# Patient Record
Sex: MALE
[De-identification: names, ages, dates, MRNs, and addresses within clinical notes are randomized per-mention and may not be internally consistent; named-entity substitution may affect disease eponyms.]

---

## 2018-10-25 ENCOUNTER — HOSPITAL (OUTPATIENT)
Dept: OTHER | Age: 48
End: 2018-10-25
Attending: FAMILY MEDICINE

## 2019-08-27 ENCOUNTER — HOSPITAL ENCOUNTER (OUTPATIENT)
Dept: CT IMAGING | Facility: HOSPITAL | Age: 49
Discharge: HOME | End: 2019-08-27
Attending: FAMILY MEDICINE
Payer: COMMERCIAL

## 2019-08-27 DIAGNOSIS — K59.00 CONSTIPATION, UNSPECIFIED CONSTIPATION TYPE: ICD-10-CM

## 2019-08-27 DIAGNOSIS — Z87.19 HISTORY OF DIVERTICULITIS: ICD-10-CM

## 2019-08-27 LAB — CREAT BLD-MCNC: 1 MG/DL (ref 0.7–1.3)

## 2019-08-27 PROCEDURE — 82565 ASSAY OF CREATININE: CPT

## 2019-08-27 PROCEDURE — 74177 CT ABD & PELVIS W/CONTRAST: CPT | Performed by: FAMILY MEDICINE

## 2019-08-28 ENCOUNTER — HOSPITAL ENCOUNTER (INPATIENT)
Facility: HOSPITAL | Age: 49
LOS: 11 days | Discharge: HOME HEALTH | DRG: 330 | End: 2019-09-08
Attending: EMERGENCY MEDICINE | Admitting: HOSPITALIST
Payer: COMMERCIAL

## 2019-08-28 DIAGNOSIS — K63.1 PERFORATED SIGMOID COLON (HCC): Primary | ICD-10-CM

## 2019-08-28 LAB
ALBUMIN SERPL-MCNC: 4.1 G/DL (ref 3.4–5)
ALP LIVER SERPL-CCNC: 50 U/L (ref 45–117)
ALT SERPL-CCNC: 32 U/L (ref 16–61)
ANION GAP SERPL CALC-SCNC: 5 MMOL/L (ref 0–18)
AST SERPL-CCNC: 23 U/L (ref 15–37)
BASOPHILS # BLD AUTO: 0.08 X10(3) UL (ref 0–0.2)
BASOPHILS NFR BLD AUTO: 0.9 %
BILIRUB DIRECT SERPL-MCNC: 0.1 MG/DL (ref 0–0.2)
BILIRUB SERPL-MCNC: 0.6 MG/DL (ref 0.1–2)
BILIRUB UR QL: NEGATIVE
BUN BLD-MCNC: 17 MG/DL (ref 7–18)
BUN/CREAT SERPL: 15.6 (ref 10–20)
CALCIUM BLD-MCNC: 9.1 MG/DL (ref 8.5–10.1)
CHLORIDE SERPL-SCNC: 107 MMOL/L (ref 98–112)
CLARITY UR: CLEAR
CO2 SERPL-SCNC: 28 MMOL/L (ref 21–32)
COLOR UR: YELLOW
CREAT BLD-MCNC: 1.09 MG/DL (ref 0.7–1.3)
DEPRECATED RDW RBC AUTO: 45.9 FL (ref 35.1–46.3)
EOSINOPHIL # BLD AUTO: 0.21 X10(3) UL (ref 0–0.7)
EOSINOPHIL NFR BLD AUTO: 2.4 %
ERYTHROCYTE [DISTWIDTH] IN BLOOD BY AUTOMATED COUNT: 12.9 % (ref 11–15)
GLUCOSE BLD-MCNC: 83 MG/DL (ref 70–99)
GLUCOSE UR-MCNC: NEGATIVE MG/DL
HCT VFR BLD AUTO: 42.8 % (ref 39–53)
HGB BLD-MCNC: 13.5 G/DL (ref 13–17.5)
HGB UR QL STRIP.AUTO: NEGATIVE
IMM GRANULOCYTES # BLD AUTO: 0.02 X10(3) UL (ref 0–1)
IMM GRANULOCYTES NFR BLD: 0.2 %
KETONES UR-MCNC: NEGATIVE MG/DL
LEUKOCYTE ESTERASE UR QL STRIP.AUTO: NEGATIVE
LYMPHOCYTES # BLD AUTO: 2.06 X10(3) UL (ref 1–4)
LYMPHOCYTES NFR BLD AUTO: 23.2 %
M PROTEIN MFR SERPL ELPH: 7.9 G/DL (ref 6.4–8.2)
MCH RBC QN AUTO: 31 PG (ref 26–34)
MCHC RBC AUTO-ENTMCNC: 31.5 G/DL (ref 31–37)
MCV RBC AUTO: 98.4 FL (ref 80–100)
MONOCYTES # BLD AUTO: 0.8 X10(3) UL (ref 0.1–1)
MONOCYTES NFR BLD AUTO: 9 %
NEUTROPHILS # BLD AUTO: 5.7 X10 (3) UL (ref 1.5–7.7)
NEUTROPHILS # BLD AUTO: 5.7 X10(3) UL (ref 1.5–7.7)
NEUTROPHILS NFR BLD AUTO: 64.3 %
NITRITE UR QL STRIP.AUTO: NEGATIVE
OSMOLALITY SERPL CALC.SUM OF ELEC: 291 MOSM/KG (ref 275–295)
PH UR: 6 [PH] (ref 5–8)
PLATELET # BLD AUTO: 448 10(3)UL (ref 150–450)
POTASSIUM SERPL-SCNC: 3.8 MMOL/L (ref 3.5–5.1)
PROT UR-MCNC: NEGATIVE MG/DL
RBC # BLD AUTO: 4.35 X10(6)UL (ref 4.3–5.7)
SODIUM SERPL-SCNC: 140 MMOL/L (ref 136–145)
SP GR UR STRIP: 1.02 (ref 1–1.03)
UROBILINOGEN UR STRIP-ACNC: <2
VIT C UR-MCNC: NEGATIVE MG/DL
WBC # BLD AUTO: 8.9 X10(3) UL (ref 4–11)

## 2019-08-28 PROCEDURE — 99223 1ST HOSP IP/OBS HIGH 75: CPT | Performed by: HOSPITALIST

## 2019-08-28 RX ORDER — FENOFIBRATE 145 MG/1
145 TABLET, COATED ORAL DAILY
Status: ON HOLD | COMMUNITY
End: 2019-09-08

## 2019-08-28 RX ORDER — SODIUM CHLORIDE 0.9 % (FLUSH) 0.9 %
3 SYRINGE (ML) INJECTION AS NEEDED
Status: DISCONTINUED | OUTPATIENT
Start: 2019-08-28 | End: 2019-08-31

## 2019-08-28 RX ORDER — MORPHINE SULFATE 2 MG/ML
1 INJECTION, SOLUTION INTRAMUSCULAR; INTRAVENOUS EVERY 2 HOUR PRN
Status: DISCONTINUED | OUTPATIENT
Start: 2019-08-28 | End: 2019-08-31

## 2019-08-28 RX ORDER — MORPHINE SULFATE 2 MG/ML
2 INJECTION, SOLUTION INTRAMUSCULAR; INTRAVENOUS EVERY 2 HOUR PRN
Status: DISCONTINUED | OUTPATIENT
Start: 2019-08-28 | End: 2019-08-31

## 2019-08-28 RX ORDER — POLYETHYLENE GLYCOL 3350 17 G/17G
17 POWDER, FOR SOLUTION ORAL 2 TIMES DAILY
Status: ON HOLD | COMMUNITY
End: 2019-09-08

## 2019-08-28 RX ORDER — POTASSIUM CHLORIDE 20 MEQ/1
40 TABLET, EXTENDED RELEASE ORAL ONCE
Status: COMPLETED | OUTPATIENT
Start: 2019-08-28 | End: 2019-08-28

## 2019-08-28 RX ORDER — HEPARIN SODIUM 5000 [USP'U]/ML
5000 INJECTION, SOLUTION INTRAVENOUS; SUBCUTANEOUS EVERY 12 HOURS
Status: DISCONTINUED | OUTPATIENT
Start: 2019-08-28 | End: 2019-08-31

## 2019-08-28 RX ORDER — SODIUM CHLORIDE 9 MG/ML
INJECTION, SOLUTION INTRAVENOUS CONTINUOUS
Status: DISCONTINUED | OUTPATIENT
Start: 2019-08-28 | End: 2019-08-31

## 2019-08-28 RX ORDER — MORPHINE SULFATE 4 MG/ML
4 INJECTION, SOLUTION INTRAMUSCULAR; INTRAVENOUS EVERY 2 HOUR PRN
Status: DISCONTINUED | OUTPATIENT
Start: 2019-08-28 | End: 2019-08-31

## 2019-08-28 RX ORDER — ONDANSETRON 2 MG/ML
4 INJECTION INTRAMUSCULAR; INTRAVENOUS EVERY 6 HOURS PRN
Status: DISCONTINUED | OUTPATIENT
Start: 2019-08-28 | End: 2019-08-31

## 2019-08-28 RX ORDER — ACETAMINOPHEN 325 MG/1
650 TABLET ORAL EVERY 6 HOURS PRN
Status: DISCONTINUED | OUTPATIENT
Start: 2019-08-28 | End: 2019-08-31

## 2019-08-28 RX ORDER — TEMAZEPAM 15 MG/1
15 CAPSULE ORAL NIGHTLY PRN
Status: DISCONTINUED | OUTPATIENT
Start: 2019-08-28 | End: 2019-08-31

## 2019-08-28 NOTE — CONSULTS
[facility] HOSP - [facility]    Report of Consultation    Mary Frazier Patient Status:  Inpatient    1970 MRN [id number]   Location [facility] 1W Attending Jm Peñaloza MD   Hosp Day # 0 PCP Dominique Ghosh MD     Date of Admission:   tobacco. He reports that he drank alcohol. He reports that he has current or past drug history.     Allergies:  No Known Allergies    Medications:    Current Facility-Administered Medications:   •  Normal Saline Flush 0.9 % injection 3 mL, 3 mL, Intravenous intact    Results:  Lab Results   Component Value Date    WBC 8.9 08/28/2019    HGB 13.5 08/28/2019    HCT 42.8 08/28/2019    .0 08/28/2019    CREATSERUM 1.09 08/28/2019    BUN 17 08/28/2019     08/28/2019    K 3.8 08/28/2019     08/28/2 junction concerning for perforated colon cancer versus diverticular stricture versus other etiology. Patient is change in bowel habits with decreased stools over the last month. There is a large amount of stool on the CT scan.   Reviewed CT scan myself as

## 2019-08-28 NOTE — PLAN OF CARE
CT scan reviewed, small fluid collection between sigmoid colon and bladder is not amenable to percutaneous drain placement

## 2019-08-28 NOTE — CONSULTS
[facility] HOSP - [facility]    Report of Consultation    Armaan Bell Patient Status:  Emergency    1970 MRN [id number]   Location [address] Attending Mark Allen MD   Hosp Day # 0 PCP Isabell Carlson MD     Date o history was that of a appendectomy    Past Medical History  Past Medical History:   Diagnosis Date   • Essential hypertension    • Hyperlipidemia        Past Surgical History  Past Surgical History:   Procedure Laterality Date   • APPENDECTOMY     • APPEND .0 08/28/2019    CREATSERUM 1.09 08/28/2019    BUN 17 08/28/2019     08/28/2019    K 3.8 08/28/2019     08/28/2019    CO2 28.0 08/28/2019    GLU 83 08/28/2019    CA 9.1 08/28/2019    ALB 4.1 08/28/2019    ALKPHO 50 08/28/2019    TP 7.9 0 appendix is not clearly delineated, but there are no suspicious inflammatory manifestations in the right lower quadrant. URINARY BLADDER: Asymmetric left cephalad dome wall thickening as detailed above.  PELVIC NODES: Enlarged 1.1 x 0.8 cm left mesorectal f Probable small left renal cyst. 5. Small hiatal hernia. 6. Probable small focus of subacute to chronic epiploic appendagitis along the margin of the descending colon. 7. Lesser incidental findings as above.     Dictated by (CST): Huy Murguia MD on 8/27/

## 2019-08-28 NOTE — ED PROVIDER NOTES
Patient Seen in: [facility] Emergency Department    History   Patient presents with:  Abdomen/Flank Pain (GI/)    Stated Complaint: SENT BY  FOR ABNORMAL CT RESULTS     HPI    Pt is 53 yo M who p/w inability to have a bowel movement for the la retractions  Ab: soft, nontender, mild distension.  BS normal, no guarding or rebound  Extremities: FROM of all extremities, no cyanosis/clubbing/edema  Neuro: CN intact, normal speech, normal gait, 5/5 motor strength in all extremities, no focal deficits changes. Although no definite gas is identified within the urinary bladder at this time, the possibility of a colovesicular fistula is raised. 3. Fatty liver. 4. Probable small left renal cyst. 5. Small hiatal hernia.  6. Probable small focus of subacute t

## 2019-08-28 NOTE — CONSULTS
SURGICAL CONSULT      CC: Patient presents with:  Abdomen/Flank Pain (GI/)       PCP: Carolyn Skelton MD    History of Present Illness: Marlyn Capps  is a 52year old male with PMHx significant for HTN, HDL, LUIS DANIEL (uses CPAP at home) who presents after a Allergies     Home Medications:    Outpatient Medications Marked as Taking for the 8/28/19 encounter [facility] Encounter):  LISINOPRIL OR Take by mouth. Disp:  Rfl:    aspirin 81 MG Oral Tab Take 81 mg by mouth daily.  Disp:  Rfl:    Nutritional Supplements material. Automated exposure control for dose reduction was used. Adjustment of the mA and/or kV was done based on the patient's size. Iterative reconstruction technique for dose reduction was employed. Oral contrast was ingested.   FINDINGS: LUNG BASES: Th just cephalad to the aortic bifurcation with additional borderline left para-aortic retroperitoneal lymph nodes measuring up to 1 cm short axis. BONES:   Mild scattered thoracolumbar spine degenerative changes.  ABDOMINAL WALL: There is a small fat containi bowel movements (usually has 1-2 normal BMs/day, but recently all diarrhea multiple times a day with \"kernel like\" stools). He also notes around a 20lb weight loss in the last month as well as fevers/chills.  He was recently treated with cipro/flagyl x 10 colon  IVF, abx (zosyn)  Keep NPO until evaluated by Dr. Lavonne Parkinson   IR consulted - fluid collection not amenable to percutaneous drain placement  GI following - recommending colonoscopy in near future  Admission to 4th floor  Labs in AM    D/w RNs, pt and

## 2019-08-28 NOTE — PROGRESS NOTES
[facility] Pharmacy Note: Antimicrobial Weight Dose Adjustment for: piperacillin/tazobactam (Delmis Irons)    Nahed Murguia is a 52year old male who has been prescribed piperacillin/tazobactam (ZOSYN) 3.375 g every 8 hours.   CrCl is estimated creatinine clearance i

## 2019-08-28 NOTE — ED INITIAL ASSESSMENT (HPI)
Patient sent by PCP for eval after outpatient CT results. Patient had CT yesterday for abnormal bm x1 month. Recently finished ciprofloxacin and metronidazole.

## 2019-08-29 ENCOUNTER — APPOINTMENT (OUTPATIENT)
Dept: GENERAL RADIOLOGY | Facility: HOSPITAL | Age: 49
DRG: 330 | End: 2019-08-29
Attending: SURGERY
Payer: COMMERCIAL

## 2019-08-29 LAB
ANION GAP SERPL CALC-SCNC: 5 MMOL/L (ref 0–18)
BASOPHILS # BLD AUTO: 0.09 X10(3) UL (ref 0–0.2)
BASOPHILS NFR BLD AUTO: 1 %
BUN BLD-MCNC: 15 MG/DL (ref 7–18)
BUN/CREAT SERPL: 12.6 (ref 10–20)
CALCIUM BLD-MCNC: 8.7 MG/DL (ref 8.5–10.1)
CEA SERPL-MCNC: 1.1 NG/ML (ref ?–5)
CHLORIDE SERPL-SCNC: 106 MMOL/L (ref 98–112)
CO2 SERPL-SCNC: 29 MMOL/L (ref 21–32)
CREAT BLD-MCNC: 1.19 MG/DL (ref 0.7–1.3)
DEPRECATED RDW RBC AUTO: 45 FL (ref 35.1–46.3)
EOSINOPHIL # BLD AUTO: 0.31 X10(3) UL (ref 0–0.7)
EOSINOPHIL NFR BLD AUTO: 3.3 %
ERYTHROCYTE [DISTWIDTH] IN BLOOD BY AUTOMATED COUNT: 12.5 % (ref 11–15)
GLUCOSE BLD-MCNC: 90 MG/DL (ref 70–99)
HAV IGM SER QL: 2.1 MG/DL (ref 1.6–2.6)
HCT VFR BLD AUTO: 38.6 % (ref 39–53)
HGB BLD-MCNC: 12.2 G/DL (ref 13–17.5)
IMM GRANULOCYTES # BLD AUTO: 0.04 X10(3) UL (ref 0–1)
IMM GRANULOCYTES NFR BLD: 0.4 %
LYMPHOCYTES # BLD AUTO: 2.21 X10(3) UL (ref 1–4)
LYMPHOCYTES NFR BLD AUTO: 23.6 %
MCH RBC QN AUTO: 30.8 PG (ref 26–34)
MCHC RBC AUTO-ENTMCNC: 31.6 G/DL (ref 31–37)
MCV RBC AUTO: 97.5 FL (ref 80–100)
MONOCYTES # BLD AUTO: 1 X10(3) UL (ref 0.1–1)
MONOCYTES NFR BLD AUTO: 10.7 %
NEUTROPHILS # BLD AUTO: 5.7 X10 (3) UL (ref 1.5–7.7)
NEUTROPHILS # BLD AUTO: 5.7 X10(3) UL (ref 1.5–7.7)
NEUTROPHILS NFR BLD AUTO: 61 %
OSMOLALITY SERPL CALC.SUM OF ELEC: 290 MOSM/KG (ref 275–295)
PHOSPHATE SERPL-MCNC: 3.1 MG/DL (ref 2.5–4.9)
PLATELET # BLD AUTO: 395 10(3)UL (ref 150–450)
POTASSIUM SERPL-SCNC: 4.4 MMOL/L (ref 3.5–5.1)
RBC # BLD AUTO: 3.96 X10(6)UL (ref 4.3–5.7)
SODIUM SERPL-SCNC: 140 MMOL/L (ref 136–145)
WBC # BLD AUTO: 9.4 X10(3) UL (ref 4–11)

## 2019-08-29 PROCEDURE — 99233 SBSQ HOSP IP/OBS HIGH 50: CPT | Performed by: HOSPITALIST

## 2019-08-29 PROCEDURE — 74270 X-RAY XM COLON 1CNTRST STD: CPT | Performed by: SURGERY

## 2019-08-29 PROCEDURE — 74019 RADEX ABDOMEN 2 VIEWS: CPT | Performed by: SURGERY

## 2019-08-29 RX ORDER — MAGNESIUM CARB/ALUMINUM HYDROX 105-160MG
30 TABLET,CHEWABLE ORAL EVERY 6 HOURS
Status: DISCONTINUED | OUTPATIENT
Start: 2019-08-29 | End: 2019-08-29

## 2019-08-29 RX ORDER — ONDANSETRON 2 MG/ML
8 INJECTION INTRAMUSCULAR; INTRAVENOUS EVERY 6 HOURS PRN
Status: DISCONTINUED | OUTPATIENT
Start: 2019-08-29 | End: 2019-09-08

## 2019-08-29 RX ORDER — METOCLOPRAMIDE HYDROCHLORIDE 5 MG/ML
10 INJECTION INTRAMUSCULAR; INTRAVENOUS EVERY 6 HOURS PRN
Status: DISCONTINUED | OUTPATIENT
Start: 2019-08-29 | End: 2019-09-08

## 2019-08-29 NOTE — PROGRESS NOTES
[facility]    Progress Note    Iris Kimbrough Patient Status:  Inpatient    1970 MRN [id number]   Location [facility] 4W/SW/SE Attending Deysi Zavaleta, [address] [facility] Day # 1 PCP Jermaine Liz MD       Subjective:   Tanisha Stanton CREATSERUM 1.19 08/29/2019    BUN 15 08/29/2019     08/29/2019    K 4.4 08/29/2019     08/29/2019    CO2 29.0 08/29/2019    GLU 90 08/29/2019    CA 8.7 08/29/2019    ALB 4.1 08/28/2019    ALKPHO 50 08/28/2019    BILT 0.6 08/28/2019    TP 7. 9 at this time, the possibility of a colovesicular fistula is raised. 3. Fatty liver. 4. Probable small left renal cyst. 5. Small hiatal hernia. 6. Probable small focus of subacute to chronic epiploic appendagitis along the margin of the descending colon.  7. between the bladder dome and the inferior aspect of the sigmoid colon. - apprec surg consult. Discussed with dr Arsenio Anton. Likely colon cancer based on lower GI series and planning surgery Saturday.  He will discuss with the patient regarding this likely d

## 2019-08-29 NOTE — PAYOR COMM NOTE
--------------  ADMISSION REVIEW     [facility])  Subscriber #:  [id number]  Authorization Number: [id number]    Admit date: 8/28/19  Admit time: 6828       Admitting Physician: Clotilde Samuels MD  Attending Physician:  Rajendra Tillman DO src 08/28/19 1248 Oral   SpO2 08/28/19 1248 95 %   O2 Device 08/28/19 1248 None (Room air)     Current:/80   Pulse 67   Temp 98.1 °F (36.7 °C) (Oral)   Resp 18   Ht 177.8 cm (5' 10\")   Wt 116.1 kg   SpO2 95%   BMI 36.73 kg/m²      Physical Exam  GEN fistula is raised. 3. Fatty liver. 4. Probable small left renal cyst. 5. Small hiatal hernia. 6. Probable small focus of subacute to chronic epiploic appendagitis along the margin of the descending colon. 7. Lesser incidental findings as above.         Radi discomfort associated with intermittent small bowel movements, weight loss, and intermittent fever for the last month.   Today, he was seen by his primary care physician, who sent him for a CT scan of the abdomen, which showed extensive sigmoid colonic wall PHYSICAL EXAMINATION:    GENERAL:  Alert and oriented to time, place and person. Mild distress. VITAL SIGNS:  Temperature 98.1, pulse 65, respiratory rate 20, blood pressure 120/74, pulse ox 95% on room air. HEENT:  Atraumatic, oropharynx clear. between the bladder dome and the inferior aspect of the sigmoid colon. His last colonoscopy was 9 years ago.   Clinically patient does not have any pain and his abdomen is soft.      Recommendations:  I discussed with the patient as well as his wife by bed malignancy. No acute surgical intervention at this time. Begin VTE prophylaxis. We will reassess the patient in cesar Singh MD [facility]  GENERAL SURGERY  8/28/2019  6:40 PM     PLAN:     Perforated colon  IVF, abx (zosyn)  Keep NPO until evaluat residual stool throughout the remainder of the colon secondary to a partially obstructing colonic lesion.   Markedly redundant hepatic and splenic flexures    Assessment and Plan:       ASSESSMENT AND PLAN:    Perforated sigmoid diverticulitis with pericolo Intravenous Margarette Petit RN          REVIEWER COMMENTS:     FOR REVIEW/APPROVAL OF INPT ADMISSION

## 2019-08-29 NOTE — PROGRESS NOTES
[facility] Gastroenterology Progress Note    Atul Cardoza  [id number]  7/8/1970    No ref.  provider found    24 hour events   -passing flatus and small amounts of stools  -no abdominal pain  -no fevers    MEDICAL HISTORY: normocephalic, oropharynx clear  GI: soft, slight LLQ tenderness with deep palpation, no guarding/rebound   EXTREMITIES: no c/c/e  Neuro: A&Ox3    LABS and STUDIES:     Lab Results   Component Value Date/Time    WBC 9.4 08/29/2019 05:39 AM    HGB 12.2 (L) enlargement. KIDNEYS:   Symmetric enhancement is seen without evidence of hydronephrosis or underlying solid masses. Small 1.4 cm left anterior interpolar renal cyst. GI/MESENTERY:  There is no evidence of bowel obstruction. Small hiatal hernia.   Extens bladder. Imaging appearance is  most suspicious for perforated sigmoid colonic malignancy with perforated infectious/inflammatory colitis and associated abscess formation considered less likely.   Enlarged lymph nodes in the sigmoid mesentery, left mesorec material within the large bowel, in the pattern and distribution of the contrast material has not significantly changed since previous exam and there is absence of significant large bowel distention in the region of the sigmoid colon and rectum.   The appea

## 2019-08-29 NOTE — PROGRESS NOTES
[facility]    Progress Note    Mary Frazier Patient Status:  Inpatient    1970 MRN [id number]   Location [facility] 4W/SW/SE Attending Vahe Rosado,    Hosp Day # 1 PCP Dominique Ghosh MD     Subjective:     No compali LISBET Ramirez  8/29/2019  9:01 AM    Will d/w Dr. Molina Rose  Patient examined myself as well as with PA. Abdomen soft nondistended nontender. No palpable masses present. Labs normal.  Obstructive series with mildly dilated loops of small bowel.   Large a 8/27/2019  CONCLUSION:  1. Extensive sigmoid colonic wall thickening with surrounding inflammatory changes and associated 2.4 x 1.8 cm rim enhancing fluid collection interposed between the undersurface of the sigmoid colon and dome of the urinary bladder. bowel loops.     Dictated by (CST): Esteban Gill MD on 8/29/2019 at 8:21     Approved by (CST): Esteban Gill MD on 8/29/2019 at 8:24

## 2019-08-29 NOTE — PLAN OF CARE
Problem: Patient Centered Care  Goal: Patient preferences are identified and integrated in the patient's plan of care  Description  Interventions:  - What would you like us to know as we care for you?   - Provide timely, complete, and accurate informatio growth factors as ordered  - Implement neutropenic guidelines  Outcome: Progressing     Problem: SAFETY ADULT - FALL  Goal: Free from fall injury  Description  INTERVENTIONS:  - Assess pt frequently for physical needs  - Identify cognitive and physical def measures as appropriate  - Advance diet as tolerated, if ordered  - Obtain nutritional consult as needed  - Evaluate fluid balance  Outcome: Progressing     Mani Eden (Lorraine Wheeler) is aware of his plan of care. Patient is alert and oriented x4, room air.  Denies any

## 2019-08-29 NOTE — PLAN OF CARE
Vss, remains NPO, CT and xr of colon today, IVF and ABT continued, ambulating freely, voiding and had BM- f/u obs series in AM. Wife at bedside     Problem: Patient Centered Care  Goal: Patient preferences are identified and integrated in the patient's izabela INFECTION - ADULT  Goal: Absence of fever/infection during anticipated neutropenic period  Description  INTERVENTIONS  - Monitor WBC  - Administer growth factors as ordered  - Implement neutropenic guidelines  Outcome: Progressing     Problem: SAFETY ADULT Nasogastric tube to low intermittent suction as ordered  - Evaluate effectiveness of ordered antiemetic medications  - Provide nonpharmacologic comfort measures as appropriate  - Advance diet as tolerated, if ordered  - Obtain nutritional consult as needed

## 2019-08-30 ENCOUNTER — APPOINTMENT (OUTPATIENT)
Dept: GENERAL RADIOLOGY | Facility: HOSPITAL | Age: 49
DRG: 330 | End: 2019-08-30
Attending: HOSPITALIST
Payer: COMMERCIAL

## 2019-08-30 ENCOUNTER — ANESTHESIA EVENT (OUTPATIENT)
Dept: SURGERY | Facility: HOSPITAL | Age: 49
DRG: 330 | End: 2019-08-30
Payer: COMMERCIAL

## 2019-08-30 ENCOUNTER — TELEPHONE (OUTPATIENT)
Dept: SURGERY | Facility: CLINIC | Age: 49
End: 2019-08-30

## 2019-08-30 ENCOUNTER — APPOINTMENT (OUTPATIENT)
Dept: GENERAL RADIOLOGY | Facility: HOSPITAL | Age: 49
DRG: 330 | End: 2019-08-30
Attending: SURGERY
Payer: COMMERCIAL

## 2019-08-30 DIAGNOSIS — N32.1 COLO-VESICAL FISTULA: Primary | ICD-10-CM

## 2019-08-30 LAB
ANION GAP SERPL CALC-SCNC: 7 MMOL/L (ref 0–18)
ANTIBODY SCREEN: NEGATIVE
BASOPHILS # BLD AUTO: 0.03 X10(3) UL (ref 0–0.2)
BASOPHILS NFR BLD AUTO: 0.3 %
BUN BLD-MCNC: 21 MG/DL (ref 7–18)
BUN/CREAT SERPL: 20.6 (ref 10–20)
CALCIUM BLD-MCNC: 8.5 MG/DL (ref 8.5–10.1)
CHLORIDE SERPL-SCNC: 108 MMOL/L (ref 98–112)
CO2 SERPL-SCNC: 26 MMOL/L (ref 21–32)
CREAT BLD-MCNC: 1.02 MG/DL (ref 0.7–1.3)
DEPRECATED RDW RBC AUTO: 43.6 FL (ref 35.1–46.3)
EOSINOPHIL # BLD AUTO: 0.01 X10(3) UL (ref 0–0.7)
EOSINOPHIL NFR BLD AUTO: 0.1 %
ERYTHROCYTE [DISTWIDTH] IN BLOOD BY AUTOMATED COUNT: 12.5 % (ref 11–15)
GLUCOSE BLD-MCNC: 109 MG/DL (ref 70–99)
HAV IGM SER QL: 2.1 MG/DL (ref 1.6–2.6)
HCT VFR BLD AUTO: 37.8 % (ref 39–53)
HGB BLD-MCNC: 12.3 G/DL (ref 13–17.5)
IMM GRANULOCYTES # BLD AUTO: 0.04 X10(3) UL (ref 0–1)
IMM GRANULOCYTES NFR BLD: 0.4 %
LYMPHOCYTES # BLD AUTO: 0.79 X10(3) UL (ref 1–4)
LYMPHOCYTES NFR BLD AUTO: 8.6 %
MCH RBC QN AUTO: 31.1 PG (ref 26–34)
MCHC RBC AUTO-ENTMCNC: 32.5 G/DL (ref 31–37)
MCV RBC AUTO: 95.7 FL (ref 80–100)
MONOCYTES # BLD AUTO: 0.46 X10(3) UL (ref 0.1–1)
MONOCYTES NFR BLD AUTO: 5 %
NEUTROPHILS # BLD AUTO: 7.81 X10 (3) UL (ref 1.5–7.7)
NEUTROPHILS # BLD AUTO: 7.81 X10(3) UL (ref 1.5–7.7)
NEUTROPHILS NFR BLD AUTO: 85.6 %
OSMOLALITY SERPL CALC.SUM OF ELEC: 296 MOSM/KG (ref 275–295)
PHOSPHATE SERPL-MCNC: 4.3 MG/DL (ref 2.5–4.9)
PLATELET # BLD AUTO: 378 10(3)UL (ref 150–450)
POTASSIUM SERPL-SCNC: 3.4 MMOL/L (ref 3.5–5.1)
RBC # BLD AUTO: 3.95 X10(6)UL (ref 4.3–5.7)
RH BLOOD TYPE: POSITIVE
SODIUM SERPL-SCNC: 141 MMOL/L (ref 136–145)
WBC # BLD AUTO: 9.1 X10(3) UL (ref 4–11)

## 2019-08-30 PROCEDURE — 99233 SBSQ HOSP IP/OBS HIGH 50: CPT | Performed by: HOSPITALIST

## 2019-08-30 PROCEDURE — 71045 X-RAY EXAM CHEST 1 VIEW: CPT | Performed by: HOSPITALIST

## 2019-08-30 PROCEDURE — 99253 IP/OBS CNSLTJ NEW/EST LOW 45: CPT | Performed by: UROLOGY

## 2019-08-30 PROCEDURE — 74019 RADEX ABDOMEN 2 VIEWS: CPT | Performed by: SURGERY

## 2019-08-30 NOTE — WOUND PROGRESS NOTE
Wound and Ostomy Care Services  Consulted to see the pt. for pre-op stoma site markings for the LLQ and RLQ sites, per Dr. Niurka Carmona the pt. will have surgery tomorrow.  The pt is resting in bed, his spouse is at the bedside, both are aware of the pre-op st

## 2019-08-30 NOTE — PLAN OF CARE
Plan for surgery tomorrow AM, pt marked for ileostomy sites today, maintained NPO status and sips with meds, preop EKG and and CXR done, consents signed and in chart. Per Dr. Isha Vasquez hold heparin after 9pm dose tonight.  CHG bath tonight and tomorrow toy side effects  - Notify MD/LIP if interventions unsuccessful or patient reports new pain  - Anticipate increased pain with activity and pre-medicate as appropriate  Outcome: Progressing     Problem: RISK FOR INFECTION - ADULT  Goal: Absence of fever/infecti system  Outcome: Progressing     Problem: GASTROINTESTINAL - ADULT  Goal: Minimal or absence of nausea and vomiting  Description  INTERVENTIONS:  - Maintain adequate hydration with IV or PO as ordered and tolerated  - Nasogastric tube to low intermittent s

## 2019-08-30 NOTE — PROGRESS NOTES
[facility]    Progress Note    Atul Cardoza Patient Status:  Inpatient    1970 MRN [id number]   Location [facility] 4W/SW/SE Attending Rajendra Tillman, DO   Hosp Day # 2 PCP Christy Peck MD     Subjective:     No compali the patient as well as his wife. .  Both understand consent and wished to proceed with surgery. Total time spent in direct patient contact and decision-making  And coordinating the patient's care including greater than 50% face-to-face was  25  minutes. Amanda Anguiano MD on 8/29/2019 at 15:10     Approved by (CST): Blaise Pal MD on 8/29/2019 at 15:18          Xr Abdomen Obstructive Series Routine(2 Vw)(cpt=74019)    Result Date: 8/30/2019  CONCLUSION:  1.  There is still a large amount of contrast throu

## 2019-08-30 NOTE — PROGRESS NOTES
[facility]    Progress Note    Armaan Bell Patient Status:  Inpatient    1970 MRN [id number]   Location [facility] 4W/SW/SE Attending Frances Patel, [address] [facility] Day # 2 PCP Isabell Carlson MD       Subjective:   Piotr Jj Heparin Sodium (Porcine) 5000 UNIT/ML injection 5,000 Units 5,000 Units Subcutaneous Q12H       Lab Results   Component Value Date    WBC 9.1 08/30/2019    HGB 12.3 (L) 08/30/2019    HCT 37.8 (L) 08/30/2019    .0 08/30/2019    CREATSERUM 1.02 08/3 dilated small bowel loop in the left mid abdomen, new since prior exam.  There is oral contrast material within the large bowel, in the pattern and distribution of the contrast material has not significantly changed since previous exam and there is absence time was spent counseling patient, discussing plan of care, discussing labs and imaging findings. Spoke with consultant. All questions answered.

## 2019-08-30 NOTE — PROGRESS NOTES
[facility] Gastroenterology Progress Note    Kaleigh Neff  [id number]  7/8/1970    No ref.  provider found    24 hour events   -Denies abdominal pain  -Reports loose bowel movements    MEDICAL HISTORY:     Past Medical History: GENERAL: well developed, well nourished,in no apparent distress  SKIN: no rashes,no suspicious lesions  HEENT: atraumatic, normocephalic, oropharynx clear  GI: soft, slight LLQ tenderness with deep palpation, no guarding/rebound   EXTREMITIES: no c/c/e fluid collection, ductal dilatation, or atrophy. SPLEEN: No enlargement. Anterior splenule. ADRENALS:   No defined mass or abnormal enlargement. KIDNEYS:   Symmetric enhancement is seen without evidence of hydronephrosis or underlying solid masses.   Sma associated 2.4 x 1.8 cm rim enhancing fluid collection interposed between the undersurface of the sigmoid colon and dome of the urinary bladder.   Imaging appearance is  most suspicious for perforated sigmoid colonic malignancy with perforated infectious/in OTHER: Negative.           CONCLUSION: There is a dilated small bowel loop in the left mid abdomen, new since prior exam.  There is oral contrast material within the large bowel, in the pattern and distribution of the contrast material has not significantly

## 2019-08-30 NOTE — PLAN OF CARE
A&Ox4. Pt up ad [place]. Pt NPO except sips with meds. N/Vx2. Zofran and reglan given. Relief with reglan. Per Dr. Livan Redmond place 18 Fr NGT to LIS if emesis persists. Pt c/o abd pain. Abd distended but soft. +BS. Pain relieved with Morphine.  Scheduled zos influences on pain and pain management  - Manage/alleviate anxiety  - Utilize distraction and/or relaxation techniques  - Monitor for opioid side effects  - Notify MD/LIP if interventions unsuccessful or patient reports new pain  - Anticipate increased amy post-hospital services based on physician/LIP order or complex needs related to functional status, cognitive ability or social support system  Outcome: Progressing     Problem: GASTROINTESTINAL - ADULT  Goal: Minimal or absence of nausea and vomiting  Desc

## 2019-08-30 NOTE — CONSULTS
[facility]    Report of Consultation    Armaan Bell Patient Status:  Inpatient    1970 MRN [id number]   Location [facility] 4W/SW/SE Attending Frances Patel,    Hosp Day # 2 PCP Isabell Carlson MD     Date of Admission History   Problem Relation Age of Onset   • Heart Disease Mother    • Cancer Paternal Uncle         STOMACH CANCER       Social History  Patient Guardian Status:  Not on file    Other Topics            Concern    None on file    Social History Narrative non-tender; bowel sounds normal; no masses,  no organomegaly and Stoma marks appearing to either side of the umbilicus  Male genitalia: normal  Rectal: normal tone, normal prostate, no masses or tenderness and Prostate approximately 35 to 40 g symmetric wi obstruction at the sigmoid. Correlate clinically. No gross evidence of free air.     Dictated by (CST): Jerry Glez MD on 8/30/2019 at 8:04     Approved by (CST): Jerry Glez MD on 8/30/2019 at 8:10          Xr Abdomen Obstructive Series Routine(2 V that if a partial bladder resection is performed he will have to have a catheter in place for 10 days cystogram prior to removing it. I spent well over 50 minutes with patient more than half the time in face-to-face discussion.         Recommendations:  Cy

## 2019-08-31 ENCOUNTER — ANESTHESIA (OUTPATIENT)
Dept: SURGERY | Facility: HOSPITAL | Age: 49
DRG: 330 | End: 2019-08-31
Payer: COMMERCIAL

## 2019-08-31 LAB
ANION GAP SERPL CALC-SCNC: 6 MMOL/L (ref 0–18)
BASOPHILS # BLD AUTO: 0.04 X10(3) UL (ref 0–0.2)
BASOPHILS NFR BLD AUTO: 0.5 %
BUN BLD-MCNC: 20 MG/DL (ref 7–18)
BUN/CREAT SERPL: 21.7 (ref 10–20)
CALCIUM BLD-MCNC: 8.2 MG/DL (ref 8.5–10.1)
CHLORIDE SERPL-SCNC: 112 MMOL/L (ref 98–112)
CO2 SERPL-SCNC: 26 MMOL/L (ref 21–32)
CREAT BLD-MCNC: 0.92 MG/DL (ref 0.7–1.3)
DEPRECATED RDW RBC AUTO: 44.5 FL (ref 35.1–46.3)
EOSINOPHIL # BLD AUTO: 0.12 X10(3) UL (ref 0–0.7)
EOSINOPHIL NFR BLD AUTO: 1.6 %
ERYTHROCYTE [DISTWIDTH] IN BLOOD BY AUTOMATED COUNT: 12.6 % (ref 11–15)
GLUCOSE BLD-MCNC: 90 MG/DL (ref 70–99)
HAV IGM SER QL: 2.1 MG/DL (ref 1.6–2.6)
HBV SURFACE AG SER-ACNC: <0.1 [IU]/L
HBV SURFACE AG SERPL QL IA: NONREACTIVE
HCT VFR BLD AUTO: 36.2 % (ref 39–53)
HCV AB SERPL QL IA: NONREACTIVE
HGB BLD-MCNC: 11.6 G/DL (ref 13–17.5)
HIV1+2 AB SPEC QL IA.RAPID: NONREACTIVE
IMM GRANULOCYTES # BLD AUTO: 0.02 X10(3) UL (ref 0–1)
IMM GRANULOCYTES NFR BLD: 0.3 %
INR BLD: 1.25 (ref 0.9–1.2)
LYMPHOCYTES # BLD AUTO: 1.62 X10(3) UL (ref 1–4)
LYMPHOCYTES NFR BLD AUTO: 21.4 %
MCH RBC QN AUTO: 30.9 PG (ref 26–34)
MCHC RBC AUTO-ENTMCNC: 32 G/DL (ref 31–37)
MCV RBC AUTO: 96.3 FL (ref 80–100)
MONOCYTES # BLD AUTO: 0.74 X10(3) UL (ref 0.1–1)
MONOCYTES NFR BLD AUTO: 9.8 %
NEUTROPHILS # BLD AUTO: 5.04 X10 (3) UL (ref 1.5–7.7)
NEUTROPHILS # BLD AUTO: 5.04 X10(3) UL (ref 1.5–7.7)
NEUTROPHILS NFR BLD AUTO: 66.4 %
OSMOLALITY SERPL CALC.SUM OF ELEC: 300 MOSM/KG (ref 275–295)
PHOSPHATE SERPL-MCNC: 2.3 MG/DL (ref 2.5–4.9)
PLATELET # BLD AUTO: 380 10(3)UL (ref 150–450)
POTASSIUM SERPL-SCNC: 3.4 MMOL/L (ref 3.5–5.1)
PROTHROMBIN TIME: 15.6 SECONDS (ref 11.8–14.5)
RBC # BLD AUTO: 3.76 X10(6)UL (ref 4.3–5.7)
SODIUM SERPL-SCNC: 144 MMOL/L (ref 136–145)
WBC # BLD AUTO: 7.6 X10(3) UL (ref 4–11)

## 2019-08-31 PROCEDURE — 99233 SBSQ HOSP IP/OBS HIGH 50: CPT | Performed by: HOSPITALIST

## 2019-08-31 PROCEDURE — 0DJD8ZZ INSPECTION OF LOWER INTESTINAL TRACT, VIA NATURAL OR ARTIFICIAL OPENING ENDOSCOPIC: ICD-10-PCS | Performed by: SURGERY

## 2019-08-31 PROCEDURE — 52332 CYSTOSCOPY AND TREATMENT: CPT | Performed by: UROLOGY

## 2019-08-31 PROCEDURE — 99232 SBSQ HOSP IP/OBS MODERATE 35: CPT | Performed by: UROLOGY

## 2019-08-31 PROCEDURE — 0D1N0Z4 BYPASS SIGMOID COLON TO CUTANEOUS, OPEN APPROACH: ICD-10-PCS | Performed by: SURGERY

## 2019-08-31 PROCEDURE — 0TJB8ZZ INSPECTION OF BLADDER, VIA NATURAL OR ARTIFICIAL OPENING ENDOSCOPIC: ICD-10-PCS | Performed by: UROLOGY

## 2019-08-31 PROCEDURE — 0TN70ZZ RELEASE LEFT URETER, OPEN APPROACH: ICD-10-PCS | Performed by: SURGERY

## 2019-08-31 RX ORDER — ACETAMINOPHEN 10 MG/ML
INJECTION, SOLUTION INTRAVENOUS AS NEEDED
Status: DISCONTINUED | OUTPATIENT
Start: 2019-08-31 | End: 2019-08-31 | Stop reason: SURG

## 2019-08-31 RX ORDER — SODIUM CHLORIDE, SODIUM LACTATE, POTASSIUM CHLORIDE, CALCIUM CHLORIDE 600; 310; 30; 20 MG/100ML; MG/100ML; MG/100ML; MG/100ML
INJECTION, SOLUTION INTRAVENOUS CONTINUOUS PRN
Status: DISCONTINUED | OUTPATIENT
Start: 2019-08-31 | End: 2019-08-31 | Stop reason: SURG

## 2019-08-31 RX ORDER — MORPHINE SULFATE 2 MG/ML
2 INJECTION, SOLUTION INTRAMUSCULAR; INTRAVENOUS EVERY 10 MIN PRN
Status: DISCONTINUED | OUTPATIENT
Start: 2019-08-31 | End: 2019-08-31 | Stop reason: ALTCHOICE

## 2019-08-31 RX ORDER — HYDROCODONE BITARTRATE AND ACETAMINOPHEN 5; 325 MG/1; MG/1
2 TABLET ORAL AS NEEDED
Status: DISCONTINUED | OUTPATIENT
Start: 2019-08-31 | End: 2019-08-31 | Stop reason: ALTCHOICE

## 2019-08-31 RX ORDER — DEXTROSE, SODIUM CHLORIDE, AND POTASSIUM CHLORIDE 5; .45; .15 G/100ML; G/100ML; G/100ML
INJECTION INTRAVENOUS CONTINUOUS
Status: DISCONTINUED | OUTPATIENT
Start: 2019-08-31 | End: 2019-09-02

## 2019-08-31 RX ORDER — GLYCOPYRROLATE 0.2 MG/ML
INJECTION INTRAMUSCULAR; INTRAVENOUS AS NEEDED
Status: DISCONTINUED | OUTPATIENT
Start: 2019-08-31 | End: 2019-08-31 | Stop reason: SURG

## 2019-08-31 RX ORDER — HYDROCODONE BITARTRATE AND ACETAMINOPHEN 5; 325 MG/1; MG/1
1 TABLET ORAL AS NEEDED
Status: DISCONTINUED | OUTPATIENT
Start: 2019-08-31 | End: 2019-08-31 | Stop reason: ALTCHOICE

## 2019-08-31 RX ORDER — HALOPERIDOL 5 MG/ML
0.25 INJECTION INTRAMUSCULAR ONCE AS NEEDED
Status: DISCONTINUED | OUTPATIENT
Start: 2019-08-31 | End: 2019-08-31 | Stop reason: HOSPADM

## 2019-08-31 RX ORDER — ACETAMINOPHEN 10 MG/ML
1000 INJECTION, SOLUTION INTRAVENOUS EVERY 6 HOURS
Status: DISCONTINUED | OUTPATIENT
Start: 2019-08-31 | End: 2019-09-08

## 2019-08-31 RX ORDER — MORPHINE SULFATE 4 MG/ML
4 INJECTION, SOLUTION INTRAMUSCULAR; INTRAVENOUS EVERY 10 MIN PRN
Status: DISCONTINUED | OUTPATIENT
Start: 2019-08-31 | End: 2019-08-31 | Stop reason: ALTCHOICE

## 2019-08-31 RX ORDER — PROCHLORPERAZINE EDISYLATE 5 MG/ML
5 INJECTION INTRAMUSCULAR; INTRAVENOUS ONCE AS NEEDED
Status: DISCONTINUED | OUTPATIENT
Start: 2019-08-31 | End: 2019-08-31 | Stop reason: HOSPADM

## 2019-08-31 RX ORDER — HYDROMORPHONE HYDROCHLORIDE 1 MG/ML
0.2 INJECTION, SOLUTION INTRAMUSCULAR; INTRAVENOUS; SUBCUTANEOUS EVERY 5 MIN PRN
Status: DISCONTINUED | OUTPATIENT
Start: 2019-08-31 | End: 2019-08-31 | Stop reason: HOSPADM

## 2019-08-31 RX ORDER — HYDROMORPHONE HYDROCHLORIDE 1 MG/ML
0.6 INJECTION, SOLUTION INTRAMUSCULAR; INTRAVENOUS; SUBCUTANEOUS EVERY 5 MIN PRN
Status: DISCONTINUED | OUTPATIENT
Start: 2019-08-31 | End: 2019-08-31 | Stop reason: HOSPADM

## 2019-08-31 RX ORDER — MORPHINE SULFATE 10 MG/ML
6 INJECTION, SOLUTION INTRAMUSCULAR; INTRAVENOUS EVERY 10 MIN PRN
Status: DISCONTINUED | OUTPATIENT
Start: 2019-08-31 | End: 2019-08-31 | Stop reason: ALTCHOICE

## 2019-08-31 RX ORDER — HYDROMORPHONE HYDROCHLORIDE 1 MG/ML
0.4 INJECTION, SOLUTION INTRAMUSCULAR; INTRAVENOUS; SUBCUTANEOUS EVERY 5 MIN PRN
Status: DISCONTINUED | OUTPATIENT
Start: 2019-08-31 | End: 2019-08-31 | Stop reason: HOSPADM

## 2019-08-31 RX ORDER — ROCURONIUM BROMIDE 10 MG/ML
INJECTION, SOLUTION INTRAVENOUS AS NEEDED
Status: DISCONTINUED | OUTPATIENT
Start: 2019-08-31 | End: 2019-08-31 | Stop reason: SURG

## 2019-08-31 RX ORDER — SODIUM CHLORIDE, SODIUM LACTATE, POTASSIUM CHLORIDE, CALCIUM CHLORIDE 600; 310; 30; 20 MG/100ML; MG/100ML; MG/100ML; MG/100ML
INJECTION, SOLUTION INTRAVENOUS CONTINUOUS
Status: DISCONTINUED | OUTPATIENT
Start: 2019-08-31 | End: 2019-08-31 | Stop reason: HOSPADM

## 2019-08-31 RX ORDER — NEOSTIGMINE METHYLSULFATE 0.5 MG/ML
INJECTION INTRAVENOUS AS NEEDED
Status: DISCONTINUED | OUTPATIENT
Start: 2019-08-31 | End: 2019-08-31 | Stop reason: SURG

## 2019-08-31 RX ORDER — HEPARIN SODIUM 5000 [USP'U]/ML
5000 INJECTION, SOLUTION INTRAVENOUS; SUBCUTANEOUS EVERY 12 HOURS
Status: DISCONTINUED | OUTPATIENT
Start: 2019-09-01 | End: 2019-09-08

## 2019-08-31 RX ORDER — NALOXONE HYDROCHLORIDE 0.4 MG/ML
80 INJECTION, SOLUTION INTRAMUSCULAR; INTRAVENOUS; SUBCUTANEOUS AS NEEDED
Status: ACTIVE | OUTPATIENT
Start: 2019-08-31 | End: 2019-08-31

## 2019-08-31 RX ORDER — DEXAMETHASONE SODIUM PHOSPHATE 4 MG/ML
VIAL (ML) INJECTION AS NEEDED
Status: DISCONTINUED | OUTPATIENT
Start: 2019-08-31 | End: 2019-08-31 | Stop reason: SURG

## 2019-08-31 RX ORDER — ONDANSETRON 2 MG/ML
4 INJECTION INTRAMUSCULAR; INTRAVENOUS ONCE AS NEEDED
Status: DISCONTINUED | OUTPATIENT
Start: 2019-08-31 | End: 2019-08-31 | Stop reason: HOSPADM

## 2019-08-31 RX ORDER — MIDAZOLAM HYDROCHLORIDE 1 MG/ML
INJECTION INTRAMUSCULAR; INTRAVENOUS AS NEEDED
Status: DISCONTINUED | OUTPATIENT
Start: 2019-08-31 | End: 2019-08-31 | Stop reason: SURG

## 2019-08-31 RX ADMIN — ROCURONIUM BROMIDE 20 MG: 10 INJECTION, SOLUTION INTRAVENOUS at 08:51:00

## 2019-08-31 RX ADMIN — ROCURONIUM BROMIDE 10 MG: 10 INJECTION, SOLUTION INTRAVENOUS at 12:00:00

## 2019-08-31 RX ADMIN — ROCURONIUM BROMIDE 20 MG: 10 INJECTION, SOLUTION INTRAVENOUS at 10:33:00

## 2019-08-31 RX ADMIN — NEOSTIGMINE METHYLSULFATE 5 MG: 0.5 INJECTION INTRAVENOUS at 13:43:00

## 2019-08-31 RX ADMIN — MIDAZOLAM HYDROCHLORIDE 2 MG: 1 INJECTION INTRAMUSCULAR; INTRAVENOUS at 07:48:00

## 2019-08-31 RX ADMIN — ACETAMINOPHEN 1000 MG: 10 INJECTION, SOLUTION INTRAVENOUS at 13:35:00

## 2019-08-31 RX ADMIN — SODIUM CHLORIDE, SODIUM LACTATE, POTASSIUM CHLORIDE, CALCIUM CHLORIDE: 600; 310; 30; 20 INJECTION, SOLUTION INTRAVENOUS at 07:22:00

## 2019-08-31 RX ADMIN — SODIUM CHLORIDE, SODIUM LACTATE, POTASSIUM CHLORIDE, CALCIUM CHLORIDE: 600; 310; 30; 20 INJECTION, SOLUTION INTRAVENOUS at 08:49:00

## 2019-08-31 RX ADMIN — DEXAMETHASONE SODIUM PHOSPHATE 4 MG: 4 MG/ML VIAL (ML) INJECTION at 08:15:00

## 2019-08-31 RX ADMIN — SODIUM CHLORIDE: 9 INJECTION, SOLUTION INTRAVENOUS at 08:03:00

## 2019-08-31 RX ADMIN — ROCURONIUM BROMIDE 10 MG: 10 INJECTION, SOLUTION INTRAVENOUS at 09:43:00

## 2019-08-31 RX ADMIN — ROCURONIUM BROMIDE 10 MG: 10 INJECTION, SOLUTION INTRAVENOUS at 11:10:00

## 2019-08-31 RX ADMIN — ROCURONIUM BROMIDE 50 MG: 10 INJECTION, SOLUTION INTRAVENOUS at 08:04:00

## 2019-08-31 RX ADMIN — GLYCOPYRROLATE 0.8 MG: 0.2 INJECTION INTRAMUSCULAR; INTRAVENOUS at 13:43:00

## 2019-08-31 RX ADMIN — ROCURONIUM BROMIDE 10 MG: 10 INJECTION, SOLUTION INTRAVENOUS at 09:24:00

## 2019-08-31 RX ADMIN — SODIUM CHLORIDE: 9 INJECTION, SOLUTION INTRAVENOUS at 08:49:00

## 2019-08-31 RX ADMIN — ONDANSETRON 4 MG: 2 INJECTION INTRAMUSCULAR; INTRAVENOUS at 13:11:00

## 2019-08-31 NOTE — PLAN OF CARE
Pt had surgery by dr Roni Nelson, l side colostomy in place, 2 shelby #1 and #2 draining well, pt has NG tube to LIS, ok to have popsicles and ice chips.  Pt on PCA pump

## 2019-08-31 NOTE — BRIEF OP NOTE
Pre-Operative Diagnosis: colonic obstruction with perforating rectosigmoid mass     Post-Operative Diagnosis: colonic obstruction with perforating rectosigmoid mass      Procedure Performed:   Procedure(s):  exploratory laparotomy with low anterior resecti

## 2019-08-31 NOTE — ANESTHESIA POSTPROCEDURE EVALUATION
Patient: Dominique Guzman    Procedure Summary     Date:  08/31/19 Room / Location:  [address] [place] [address] MAIN OR 05 / [address] [place] [address] MAIN OR    Anesthesia Start:  0745 Anesthesia Stop:      Procedures:       COLON RESECTION LEFT (N/A Abdomen)      COLOSTOMY (N/A Abdomen)      CYSTO

## 2019-08-31 NOTE — PROGRESS NOTES
[facility]    Progress Note    Maria Fernanda Guerrero Patient Status:  Inpatient    1970 MRN [id number]   Location [place] [address] Attending Guido Bob, [address] Day # 3 PCP Darryl Lowery MD       Subjective:   Madison Landers Bisi Epperson MD on 8/29/2019 at 15:18          Xr Chest Ap Portable  (cpt=71045)    Result Date: 8/30/2019  CONCLUSION:  1. No acute cardiopulmonary disease.     Dictated by (CST): Bisi Epperson MD on 8/30/2019 at 17:06     Approved by (CST): JONATHON

## 2019-08-31 NOTE — OR PREOP
Sandra Borden RN from floor called to report Protonix IVP given on unit. This medication was a scheduled dose for patient.

## 2019-08-31 NOTE — ANESTHESIA PROCEDURE NOTES
Airway  Date/Time: 8/31/2019 7:58 AM  Urgency: elective    Difficult airway    General Information and Staff    Patient location during procedure: OR  Anesthesiologist: Joaquina Medellin MD  Performed: anesthesiologist     Indications and Patient Condition  Ind

## 2019-08-31 NOTE — ANESTHESIA PROCEDURE NOTES
Peripheral IV  Date/Time: 8/31/2019 8:03 AM  Inserted by: Eddie Cervantes MD    Placement  Needle size: 16 G  Laterality: right  Location: wrist  Local anesthetic: none  Site prep: alcohol  Technique: anatomical landmarks  Attempts: 1

## 2019-08-31 NOTE — ANESTHESIA PREPROCEDURE EVALUATION
Anesthesia PreOp Note    HPI:     Rodney Freeman is a 52year old male who presents for preoperative consultation requested by: Ginger Hwang MD    Date of Surgery: 8/28/2019 - 8/31/2019    Procedure(s):  COLON RESECTION LEFT  COLOSTOMY  CYSTOSCOP morphINE sulfate (PF) 2 MG/ML injection 2 mg 2 mg Intravenous Q2H PRN Luke Mike MD 2 mg at 08/30/19 1510    Or         morphINE sulfate (PF) 4 MG/ML injection 4 mg 4 mg Intravenous Q2H PRN Luke Mike MD 4 mg at 08/30/19 1816    Piperacillin So Attends Taoist service: Not on file        Active member of club or organization: Not on file        Attends meetings of clubs or organizations: Not on file        Relationship status: Not on file      Intimate partner violence:        Fear of cur (-) past MI, CAD    NYHA Classification: I  ECG reviewed  Rhythm: regular  Rate: normal  ROS comment: Stairs / no CP,no SOB    Neuro/Psych    (-) TIA, CVA    GI/Hepatic/Renal    (-) liver disease, renal disease    Endo/Other    (-) diabetes mellitus, hypot

## 2019-09-01 LAB
ANION GAP SERPL CALC-SCNC: 5 MMOL/L (ref 0–18)
BASOPHILS # BLD AUTO: 0.03 X10(3) UL (ref 0–0.2)
BASOPHILS NFR BLD AUTO: 0.2 %
BUN BLD-MCNC: 15 MG/DL (ref 7–18)
BUN/CREAT SERPL: 13.8 (ref 10–20)
CALCIUM BLD-MCNC: 8.2 MG/DL (ref 8.5–10.1)
CHLORIDE SERPL-SCNC: 111 MMOL/L (ref 98–112)
CO2 SERPL-SCNC: 29 MMOL/L (ref 21–32)
CREAT BLD-MCNC: 1.09 MG/DL (ref 0.7–1.3)
DEPRECATED RDW RBC AUTO: 45.6 FL (ref 35.1–46.3)
EOSINOPHIL # BLD AUTO: 0.02 X10(3) UL (ref 0–0.7)
EOSINOPHIL NFR BLD AUTO: 0.1 %
ERYTHROCYTE [DISTWIDTH] IN BLOOD BY AUTOMATED COUNT: 12.9 % (ref 11–15)
GLUCOSE BLD-MCNC: 143 MG/DL (ref 70–99)
HAV IGM SER QL: 2.1 MG/DL (ref 1.6–2.6)
HCT VFR BLD AUTO: 35.6 % (ref 39–53)
HGB BLD-MCNC: 11.5 G/DL (ref 13–17.5)
IMM GRANULOCYTES # BLD AUTO: 0.04 X10(3) UL (ref 0–1)
IMM GRANULOCYTES NFR BLD: 0.3 %
LYMPHOCYTES # BLD AUTO: 1.26 X10(3) UL (ref 1–4)
LYMPHOCYTES NFR BLD AUTO: 9.1 %
MCH RBC QN AUTO: 31.3 PG (ref 26–34)
MCHC RBC AUTO-ENTMCNC: 32.3 G/DL (ref 31–37)
MCV RBC AUTO: 96.7 FL (ref 80–100)
MONOCYTES # BLD AUTO: 1.05 X10(3) UL (ref 0.1–1)
MONOCYTES NFR BLD AUTO: 7.5 %
NEUTROPHILS # BLD AUTO: 11.52 X10 (3) UL (ref 1.5–7.7)
NEUTROPHILS # BLD AUTO: 11.52 X10(3) UL (ref 1.5–7.7)
NEUTROPHILS NFR BLD AUTO: 82.8 %
OSMOLALITY SERPL CALC.SUM OF ELEC: 303 MOSM/KG (ref 275–295)
PHOSPHATE SERPL-MCNC: 2.3 MG/DL (ref 2.5–4.9)
PLATELET # BLD AUTO: 432 10(3)UL (ref 150–450)
POTASSIUM SERPL-SCNC: 4.2 MMOL/L (ref 3.5–5.1)
RBC # BLD AUTO: 3.68 X10(6)UL (ref 4.3–5.7)
SODIUM SERPL-SCNC: 145 MMOL/L (ref 136–145)
WBC # BLD AUTO: 13.9 X10(3) UL (ref 4–11)

## 2019-09-01 PROCEDURE — 99233 SBSQ HOSP IP/OBS HIGH 50: CPT | Performed by: HOSPITALIST

## 2019-09-01 NOTE — PHYSICAL THERAPY NOTE
PHYSICAL THERAPY EVALUATION - INPATIENT     Room Number: 469/469-A  Evaluation Date: 9/1/2019  Type of Evaluation: Initial   Physician Order: PT Eval and Treat    Presenting Problem: Colon Obstruction with perforated rectosigmoid colon mass  Reason for Th endurance. His dc to home date is unknown, and he will likely improve before that time.   However, if he were going home today, he would require at least a home health evaluation for PT - at least to get him stared on building his stamina and endurance for mention.)          COGNITION  · Orientation Level:  oriented x4  · Following Commands:  follows multistep commands consistently    RANGE OF MOTION AND STRENGTH ASSESSMENT  Upper extremity ROM and strength are within functional limits.     Lower extremity RO room.  May yet possibly need at least a HHPT eval - see assessment.

## 2019-09-01 NOTE — PROGRESS NOTES
[place] [facility]    Progress Note    Linette Enrique Patient Status:  Inpatient    1970 MRN [id number]   Location [facility] 4W/SW/SE Attending Chai Connors, [address] [facility] Day # 3 PCP Rolando Sagastume MD       Subjective:   Hardy Mullins 08/28/2019    BILT 0.6 08/28/2019    TP 7.9 08/28/2019    AST 23 08/28/2019    ALT 32 08/28/2019    INR 1.25 (H) 08/31/2019    MG 2.1 08/31/2019    PHOS 2.3 (L) 08/31/2019       Xr Chest Ap Portable  (cpt=71045)    Result Date: 8/30/2019  CONCLUSION:  1.  Ruby Cline PLAN:    Perforated sigmoid diverticulitis with pericolonic abscess located between the bladder dome and the inferior aspect of the sigmoid colon.  -Patient tolerated surgery well and it appears he had a perforated sigmoid without malignancy.   Status post

## 2019-09-01 NOTE — PLAN OF CARE
POD 1 after sigmoid resection and colostomy placement by Dr. Miguel Rojas. Colostomy site pink and moist with minimal serosanguinous output. 2 JES drains in place RLQ with serosanguinous output. NG tube set to low intermittent suction.  Tolerating ice chips and relaxation techniques  - Monitor for opioid side effects  - Notify MD/LIP if interventions unsuccessful or patient reports new pain  - Anticipate increased pain with activity and pre-medicate as appropriate  Outcome: Progressing     Problem: RISK FOR INFEC cognitive ability or social support system  Outcome: Progressing     Problem: GASTROINTESTINAL - ADULT  Goal: Minimal or absence of nausea and vomiting  Description  INTERVENTIONS:  - Maintain adequate hydration with IV or PO as ordered and tolerated  - Na

## 2019-09-01 NOTE — PROGRESS NOTES
[facility] Gastroenterology Progress Note    Calvinjennifer Sweet  [id number]  7/8/1970    No ref.  provider found    24 hour events   - s/p ex lap with low anterior resection and ruth's procedure with end sigmoid colostomy    MEDI Value Date/Time    WBC 13.9 (H) 09/01/2019 05:19 AM    HGB 11.5 (L) 09/01/2019 05:19 AM    HCT 35.6 (L) 09/01/2019 05:19 AM    .0 09/01/2019 05:19 AM    No results found for: NEUTROPHILS  Lab Results   Component Value Date/Time     09/01/2019 masses. Small 1.4 cm left anterior interpolar renal cyst. GI/MESENTERY:  There is no evidence of bowel obstruction. Small hiatal hernia.   Extensive sigmoid colonic wall thickening with mild localized upstream colonic dilation and extensive surrounding in infectious/inflammatory colitis and associated abscess formation considered less likely. Enlarged lymph nodes in the sigmoid mesentery, left mesorectal fascia, and left inferior retroperitoneum are suspicious and may be metastatic in nature.   Further work changed since previous exam and there is absence of significant large bowel distention in the region of the sigmoid colon and rectum. The appearance is compatible with given history of rectosigmoid obstruction however correlate for ileus.   The dilated sma

## 2019-09-01 NOTE — PROGRESS NOTES
[place] [facility] - [facility]    Progress Note    [facility] Patient Status:  Inpatient    1970 MRN [id number]   Location [facility] 4W/SW/SE Attending Enmanuel Dodson, [address] [facility] Day # 4 PCP Sarath Van MD       Subjective:   Lata Iba 08/31/19  0504 09/01/19  0519   GLU 83   < > 109* 90 143*   BUN 17   < > 21* 20* 15   CREATSERUM 1.09   < > 1.02 0.92 1.09   GFRAA 92   < > 99 113 92   GFRNAA 79   < > 86 97 79   CA 9.1   < > 8.5 8.2* 8.2*   ALB 4.1  --   --   --   --       < > 141 1

## 2019-09-01 NOTE — PROGRESS NOTES
[facility]    Progress Note    Breana Issa Patient Status:  Inpatient    1970 MRN [id number]   Location [facility] 4W/SW/SE Attending Earl White, [address] [facility] Day # 4 PCP Gladys Freeman MD       Subjective:   Zion De Paz  09/01/2019    CO2 29.0 09/01/2019     (H) 09/01/2019    CA 8.2 (L) 09/01/2019    ALB 4.1 08/28/2019    ALKPHO 50 08/28/2019    BILT 0.6 08/28/2019    TP 7.9 08/28/2019    AST 23 08/28/2019    ALT 32 08/28/2019    INR 1.25 (H) 08/31/2019    MG

## 2019-09-01 NOTE — PLAN OF CARE
Problem: Patient Centered Care  Goal: Patient preferences are identified and integrated in the patient's plan of care  Description  Interventions:  - What would you like us to know as we care for you?   - Provide timely, complete, and accurate informatio growth factors as ordered  - Implement neutropenic guidelines  Outcome: Progressing     Problem: SAFETY ADULT - FALL  Goal: Free from fall injury  Description  INTERVENTIONS:  - Assess pt frequently for physical needs  - Identify cognitive and physical def

## 2019-09-02 LAB
ANION GAP SERPL CALC-SCNC: 4 MMOL/L (ref 0–18)
BASOPHILS # BLD AUTO: 0.06 X10(3) UL (ref 0–0.2)
BASOPHILS NFR BLD AUTO: 0.5 %
BLOOD TYPE BARCODE: 5100
BUN BLD-MCNC: 12 MG/DL (ref 7–18)
BUN/CREAT SERPL: 11.3 (ref 10–20)
CALCIUM BLD-MCNC: 8.2 MG/DL (ref 8.5–10.1)
CHLORIDE SERPL-SCNC: 111 MMOL/L (ref 98–112)
CO2 SERPL-SCNC: 31 MMOL/L (ref 21–32)
CREAT BLD-MCNC: 1.06 MG/DL (ref 0.7–1.3)
DEPRECATED RDW RBC AUTO: 46.8 FL (ref 35.1–46.3)
EOSINOPHIL # BLD AUTO: 0.31 X10(3) UL (ref 0–0.7)
EOSINOPHIL NFR BLD AUTO: 2.4 %
ERYTHROCYTE [DISTWIDTH] IN BLOOD BY AUTOMATED COUNT: 13.1 % (ref 11–15)
GLUCOSE BLD-MCNC: 122 MG/DL (ref 70–99)
HAV IGM SER QL: 2.2 MG/DL (ref 1.6–2.6)
HCT VFR BLD AUTO: 33.2 % (ref 39–53)
HGB BLD-MCNC: 10.4 G/DL (ref 13–17.5)
IMM GRANULOCYTES # BLD AUTO: 0.07 X10(3) UL (ref 0–1)
IMM GRANULOCYTES NFR BLD: 0.5 %
LYMPHOCYTES # BLD AUTO: 1.48 X10(3) UL (ref 1–4)
LYMPHOCYTES NFR BLD AUTO: 11.5 %
MCH RBC QN AUTO: 30.3 PG (ref 26–34)
MCHC RBC AUTO-ENTMCNC: 31.3 G/DL (ref 31–37)
MCV RBC AUTO: 96.8 FL (ref 80–100)
MONOCYTES # BLD AUTO: 0.89 X10(3) UL (ref 0.1–1)
MONOCYTES NFR BLD AUTO: 6.9 %
NEUTROPHILS # BLD AUTO: 10.06 X10 (3) UL (ref 1.5–7.7)
NEUTROPHILS # BLD AUTO: 10.06 X10(3) UL (ref 1.5–7.7)
NEUTROPHILS NFR BLD AUTO: 78.2 %
OSMOLALITY SERPL CALC.SUM OF ELEC: 303 MOSM/KG (ref 275–295)
PHOSPHATE SERPL-MCNC: 2.4 MG/DL (ref 2.5–4.9)
PLATELET # BLD AUTO: 380 10(3)UL (ref 150–450)
POTASSIUM SERPL-SCNC: 3.6 MMOL/L (ref 3.5–5.1)
RBC # BLD AUTO: 3.43 X10(6)UL (ref 4.3–5.7)
SODIUM SERPL-SCNC: 146 MMOL/L (ref 136–145)
WBC # BLD AUTO: 12.9 X10(3) UL (ref 4–11)

## 2019-09-02 PROCEDURE — 99233 SBSQ HOSP IP/OBS HIGH 50: CPT | Performed by: HOSPITALIST

## 2019-09-02 RX ORDER — DEXTROSE MONOHYDRATE 50 MG/ML
INJECTION, SOLUTION INTRAVENOUS CONTINUOUS
Status: DISCONTINUED | OUTPATIENT
Start: 2019-09-02 | End: 2019-09-08

## 2019-09-02 NOTE — PROGRESS NOTES
[facility]    Progress Note    Brett Lombardo Patient Status:  Inpatient    1970 MRN [id number]   Location [facility] 4W/SW/SE Attending Wesley Kaba, [address] [facility] Day # 5 PCP Sravani Clark MD       Subjective:   Pipereta  < > 90 143* 122*   BUN 17   < > 20* 15 12   CREATSERUM 1.09   < > 0.92 1.09 1.06   GFRAA 92   < > 113 92 95   GFRNAA 79   < > 97 79 82   CA 9.1   < > 8.2* 8.2* 8.2*   ALB 4.1  --   --   --   --       < > 144 145 146*   K 3.8   < > 3.4* 4.2 3.6   CL 1

## 2019-09-02 NOTE — PROGRESS NOTES
[place] [facility] - [facility]    Progress Note    Dominique Guzman Patient Status:  Inpatient    1970 MRN [id number]   Location [facility] 4W/SW/SE Attending Samantha Pinzon, [address] [facility] Day # 5 PCP Alexia Rose MD       Subjective:   Boaz Meyers 33.2 (L) 09/02/2019    .0 09/02/2019    CREATSERUM 1.06 09/02/2019    BUN 12 09/02/2019     (H) 09/02/2019    K 3.6 09/02/2019     09/02/2019    CO2 31.0 09/02/2019     (H) 09/02/2019    CA 8.2 (L) 09/02/2019    ALB 4.1 08/28/2019 DO Fany  >35 min spent with patient. > 50% time was spent counseling patient, discussing plan of care, discussing labs and imaging findings. Spoke with consultant. All questions answered.

## 2019-09-02 NOTE — PLAN OF CARE
Problem: Patient Centered Care  Goal: Patient preferences are identified and integrated in the patient's plan of care  Description  Interventions:  - What would you like us to know as we care for you?   - Provide timely, complete, and accurate informatio growth factors as ordered  - Implement neutropenic guidelines  Outcome: Progressing     Problem: SAFETY ADULT - FALL  Goal: Free from fall injury  Description  INTERVENTIONS:  - Assess pt frequently for physical needs  - Identify cognitive and physical def measures as appropriate  - Advance diet as tolerated, if ordered  - Obtain nutritional consult as needed  - Evaluate fluid balance  Outcome: Progressing     Venancio Robledo (Max Dash) is aware of his plan of care. Patient is alert and oriented x4, room air.  Pain contro

## 2019-09-02 NOTE — PLAN OF CARE
Tolerating ice chips and popsicles, ng to elaine carranza, 2 shelby drains, morphine pca for pain control, up to chair and ambulating in hallway.    Problem: Patient Centered Care  Goal: Patient preferences are identified and integrated in the patient's plan of care - ADULT  Goal: Absence of fever/infection during anticipated neutropenic period  Description  INTERVENTIONS  - Monitor WBC  - Administer growth factors as ordered  - Implement neutropenic guidelines  Outcome: Progressing     Problem: SAFETY ADULT - FALL  G tube to low intermittent suction as ordered  - Evaluate effectiveness of ordered antiemetic medications  - Provide nonpharmacologic comfort measures as appropriate  - Advance diet as tolerated, if ordered  - Obtain nutritional consult as needed  - Evaluate

## 2019-09-03 LAB
ALBUMIN SERPL-MCNC: 2.4 G/DL (ref 3.4–5)
ALP LIVER SERPL-CCNC: 35 U/L (ref 45–117)
ALT SERPL-CCNC: 37 U/L (ref 16–61)
ANION GAP SERPL CALC-SCNC: 7 MMOL/L (ref 0–18)
AST SERPL-CCNC: 53 U/L (ref 15–37)
BASOPHILS # BLD AUTO: 0.06 X10(3) UL (ref 0–0.2)
BASOPHILS NFR BLD AUTO: 0.5 %
BILIRUB DIRECT SERPL-MCNC: 0.3 MG/DL (ref 0–0.2)
BILIRUB SERPL-MCNC: 0.5 MG/DL (ref 0.1–2)
BUN BLD-MCNC: 11 MG/DL (ref 7–18)
BUN/CREAT SERPL: 11.7 (ref 10–20)
CALCIUM BLD-MCNC: 8.5 MG/DL (ref 8.5–10.1)
CHLORIDE SERPL-SCNC: 109 MMOL/L (ref 98–112)
CO2 SERPL-SCNC: 29 MMOL/L (ref 21–32)
CREAT BLD-MCNC: 0.94 MG/DL (ref 0.7–1.3)
DEPRECATED RDW RBC AUTO: 46.3 FL (ref 35.1–46.3)
EOSINOPHIL # BLD AUTO: 0.88 X10(3) UL (ref 0–0.7)
EOSINOPHIL NFR BLD AUTO: 7.5 %
ERYTHROCYTE [DISTWIDTH] IN BLOOD BY AUTOMATED COUNT: 13 % (ref 11–15)
GLUCOSE BLD-MCNC: 110 MG/DL (ref 70–99)
HAV IGM SER QL: 2.1 MG/DL (ref 1.6–2.6)
HCT VFR BLD AUTO: 30.8 % (ref 39–53)
HGB BLD-MCNC: 9.8 G/DL (ref 13–17.5)
IMM GRANULOCYTES # BLD AUTO: 0.05 X10(3) UL (ref 0–1)
IMM GRANULOCYTES NFR BLD: 0.4 %
LYMPHOCYTES # BLD AUTO: 1.83 X10(3) UL (ref 1–4)
LYMPHOCYTES NFR BLD AUTO: 15.6 %
M PROTEIN MFR SERPL ELPH: 6 G/DL (ref 6.4–8.2)
MCH RBC QN AUTO: 30.6 PG (ref 26–34)
MCHC RBC AUTO-ENTMCNC: 31.8 G/DL (ref 31–37)
MCV RBC AUTO: 96.3 FL (ref 80–100)
MONOCYTES # BLD AUTO: 0.68 X10(3) UL (ref 0.1–1)
MONOCYTES NFR BLD AUTO: 5.8 %
NEUTROPHILS # BLD AUTO: 8.23 X10 (3) UL (ref 1.5–7.7)
NEUTROPHILS # BLD AUTO: 8.23 X10(3) UL (ref 1.5–7.7)
NEUTROPHILS NFR BLD AUTO: 70.2 %
OSMOLALITY SERPL CALC.SUM OF ELEC: 300 MOSM/KG (ref 275–295)
PHOSPHATE SERPL-MCNC: 3.4 MG/DL (ref 2.5–4.9)
PLATELET # BLD AUTO: 321 10(3)UL (ref 150–450)
POTASSIUM SERPL-SCNC: 3.5 MMOL/L (ref 3.5–5.1)
RBC # BLD AUTO: 3.2 X10(6)UL (ref 4.3–5.7)
SODIUM SERPL-SCNC: 145 MMOL/L (ref 136–145)
WBC # BLD AUTO: 11.7 X10(3) UL (ref 4–11)

## 2019-09-03 PROCEDURE — 99233 SBSQ HOSP IP/OBS HIGH 50: CPT | Performed by: HOSPITALIST

## 2019-09-03 NOTE — HOME CARE LIAISON
Received referral from [address] [place] [address] for [facility] services at discharge. [facility] is not contracted with patient's insurance. Re-referral sent to [facility]- pending acceptance.

## 2019-09-03 NOTE — WOUND PROGRESS NOTE
WOUND CARE NOTE      PLAN   Recommendations:  Dr. Una Webster and Dr Wilfred Gallegos are following the pt.    Dietary consult for recommendations for nutrition to optimize wound healing- new colostomy  Turn schedules  Heels elevated using pillows, heel wedge or heel piece with the adaptor ring    Allergies: Patient has no known allergies.     Labs:   Lab Results   Component Value Date    WBC 11.7 (H) 09/03/2019    HGB 9.8 (L) 09/03/2019    HCT 30.8 (L) 09/03/2019    .0 09/03/2019    CREATSERUM 0.94 09/03/2019

## 2019-09-03 NOTE — PROGRESS NOTES
[facility]    Progress Note    Leslie Alaniz Patient Status:  Inpatient    1970 MRN [id number]   Location [facility] 4W/SW/SE Attending Markos Ruiz, [address] [facility] Day # 6 PCP Johnna Moran MD       Subjective:   Cole Su 09/01/19  0519 09/02/19  0454 09/03/19  0512   GLU 83   < > 143* 122* 110*   BUN 17   < > 15 12 11   CREATSERUM 1.09   < > 1.09 1.06 0.94   GFRAA 92   < > 92 95 110   GFRNAA 79   < > 79 82 95   CA 9.1   < > 8.2* 8.2* 8.5   ALB 4.1  --   --   --  2.4*   NA

## 2019-09-03 NOTE — PROGRESS NOTES
[facility]    Progress Note    Iris Kimbrough Patient Status:  Inpatient    1970 MRN [id number]   Location [facility] 4W/SW/SE Attending Deysi Zavaleta, [address] [facility] Day # 6 PCP Jermaine Liz MD       Subjective:   Tanisha Stanton 09/03/2019    K 3.5 09/03/2019     09/03/2019    CO2 29.0 09/03/2019     (H) 09/03/2019    CA 8.5 09/03/2019    ALB 2.4 (L) 09/03/2019    ALKPHO 35 (L) 09/03/2019    BILT 0.5 09/03/2019    TP 6.0 (L) 09/03/2019    AST 53 (H) 09/03/2019    ALT All questions answered.

## 2019-09-03 NOTE — PLAN OF CARE
No acute events overnight. Utilizing Morphine PCA for pain relief. Notes pain mostly with changing position, moving from sitting to standing position. Denies nausea. NG remains to LIS, JES x2, and Everett remains in place.  Stoma site appears healthy - serosan strengthening/mobility  - Encourage toileting schedule  Outcome: Progressing     Problem: GASTROINTESTINAL - ADULT  Goal: Minimal or absence of nausea and vomiting  Description  INTERVENTIONS:  - Maintain adequate hydration with IV or PO as ordered and nathaniel

## 2019-09-03 NOTE — PLAN OF CARE
NG remains to LIS, 2 JES drains in place, colostomy with serosanguinous output, henry remains in place. JES and incision dressings changed. Tolerating ice chips and popsicles. Denies nausea. Pain controlled with morphine PCA.  Pt ambulates in halls multiple t patient reports new pain  - Anticipate increased pain with activity and pre-medicate as appropriate  Outcome: Progressing     Problem: RISK FOR INFECTION - ADULT  Goal: Absence of fever/infection during anticipated neutropenic period  Description  INTERVEN Minimal or absence of nausea and vomiting  Description  INTERVENTIONS:  - Maintain adequate hydration with IV or PO as ordered and tolerated  - Nasogastric tube to low intermittent suction as ordered  - Evaluate effectiveness of ordered antiemetic medicati

## 2019-09-04 LAB
ANION GAP SERPL CALC-SCNC: 7 MMOL/L (ref 0–18)
BASOPHILS # BLD AUTO: 0.05 X10(3) UL (ref 0–0.2)
BASOPHILS NFR BLD AUTO: 0.5 %
BUN BLD-MCNC: 13 MG/DL (ref 7–18)
BUN/CREAT SERPL: 15.5 (ref 10–20)
CALCIUM BLD-MCNC: 8.6 MG/DL (ref 8.5–10.1)
CHLORIDE SERPL-SCNC: 108 MMOL/L (ref 98–112)
CO2 SERPL-SCNC: 27 MMOL/L (ref 21–32)
CREAT BLD-MCNC: 0.84 MG/DL (ref 0.7–1.3)
DEPRECATED RDW RBC AUTO: 45.3 FL (ref 35.1–46.3)
EOSINOPHIL # BLD AUTO: 0.83 X10(3) UL (ref 0–0.7)
EOSINOPHIL NFR BLD AUTO: 8.3 %
ERYTHROCYTE [DISTWIDTH] IN BLOOD BY AUTOMATED COUNT: 12.9 % (ref 11–15)
GLUCOSE BLD-MCNC: 98 MG/DL (ref 70–99)
HAV IGM SER QL: 2 MG/DL (ref 1.6–2.6)
HCT VFR BLD AUTO: 32.1 % (ref 39–53)
HGB BLD-MCNC: 10.2 G/DL (ref 13–17.5)
IMM GRANULOCYTES # BLD AUTO: 0.05 X10(3) UL (ref 0–1)
IMM GRANULOCYTES NFR BLD: 0.5 %
LYMPHOCYTES # BLD AUTO: 1.81 X10(3) UL (ref 1–4)
LYMPHOCYTES NFR BLD AUTO: 18.1 %
MCH RBC QN AUTO: 30.6 PG (ref 26–34)
MCHC RBC AUTO-ENTMCNC: 31.8 G/DL (ref 31–37)
MCV RBC AUTO: 96.4 FL (ref 80–100)
MONOCYTES # BLD AUTO: 0.7 X10(3) UL (ref 0.1–1)
MONOCYTES NFR BLD AUTO: 7 %
NEUTROPHILS # BLD AUTO: 6.55 X10 (3) UL (ref 1.5–7.7)
NEUTROPHILS # BLD AUTO: 6.55 X10(3) UL (ref 1.5–7.7)
NEUTROPHILS NFR BLD AUTO: 65.6 %
OSMOLALITY SERPL CALC.SUM OF ELEC: 294 MOSM/KG (ref 275–295)
PHOSPHATE SERPL-MCNC: 3.8 MG/DL (ref 2.5–4.9)
PLATELET # BLD AUTO: 365 10(3)UL (ref 150–450)
POTASSIUM SERPL-SCNC: 3.6 MMOL/L (ref 3.5–5.1)
RBC # BLD AUTO: 3.33 X10(6)UL (ref 4.3–5.7)
SODIUM SERPL-SCNC: 142 MMOL/L (ref 136–145)
WBC # BLD AUTO: 10 X10(3) UL (ref 4–11)

## 2019-09-04 PROCEDURE — 99233 SBSQ HOSP IP/OBS HIGH 50: CPT | Performed by: HOSPITALIST

## 2019-09-04 NOTE — WOUND PROGRESS NOTE
Wound and Ostomy Care Services  Follow up on the pt. scheduled time this am for teaching and stoma care. The pt. was up walking in the [place] with his spouse, his NG tube is clamped. Dr. Elizabeth Cross was here to see the pt.   The pt. is sitting up in the [facility]

## 2019-09-04 NOTE — PAYOR COMM NOTE
--------------  CONTINUED STAY REVIEW    Cyn Leonard [facility])  Subscriber #:  [id number]  Authorization Number: [id number]    Admit date: 8/28/19  Admit time: 7162    Admitting Physician: Rachelle Medellin MD  Attending Physician:  Matilde Rice MD with mobilization of splenic flexure and Fariba's procedure with end sigmoid colostomy, rigid proctoscopy, left ureterolysis.        Body Fluid Culture Result: 1+ growth Escherichia coliAbnormal        3+ growth Bacteroides thetaiotaomicronAbnormal 9-1-19    rate 18, height 70\", weight 253 lb 3.2 oz (114.9 kg), SpO2 96 %. I/O last 3 completed shifts: In: 8649 [I.V.:3202;  NG/GT:30]  Out: 3830 [Urine:1750; Emesis/NG output:860; Drains:195; Blood:300]     Abdominal: soft, mildly distended nontender; n pressure 135/77, pulse 69, temperature 98.9 °F (37.2 °C), temperature source Oral, resp. rate 18, height 70\", weight 253 lb 3.2 oz (114.9 kg), SpO2 94 %. I/O last 3 completed shifts:   In: 2488 [I.V.:2488]  Out: 9701 [Urine:3400; Emesis/NG output:2700; Deepa high-grade obstructing diverticular stricture with perforation and abscess  Doing well, pulmonary toilet, ambulate,   Clamp NG tube  Continue IV antibiotics for perforated diverticulitis with abscess  VTE prophylaxis  Continue Everett for 5 days we will need Date Action Dose Route User    9/4/2019 1150 New Bag 4.5 g Intravenous Laurel Burks, CATIE    9/4/2019 4952 New Bag 4.5 g Intravenous C[facility]ersNew [facility]    9/3/2019 1803 New Bag 4.5 g Intravenous Bhakti Velázquez      potassium chloride 40 mEq in sodium c

## 2019-09-04 NOTE — PROGRESS NOTES
[facility]    Progress Note    [facility] Patient Status:  Inpatient    1970 MRN [id number]   Location [facility] 4W/SW/SE Attending Darron Ramos, [address] [facility] Day # 7 PCP Aimee Garcia MD       Subjective:   Cbs Spotted 09/04/2019     09/04/2019    K 3.6 09/04/2019     09/04/2019    CO2 27.0 09/04/2019    GLU 98 09/04/2019    CA 8.6 09/04/2019    ALB 2.4 (L) 09/03/2019    ALKPHO 35 (L) 09/03/2019    BILT 0.5 09/03/2019    TP 6.0 (L) 09/03/2019    AST 53 (H) 09 labs and imaging findings. Spoke with consultant. All questions answered.

## 2019-09-04 NOTE — PROGRESS NOTES
[facility]    Progress Note    Armaan Bell Patient Status:  Inpatient    1970 MRN [id number]   Location [facility] 4W/SW/SE Attending Frances Patel, [address] [facility] Day # 7 PCP Isabell Carlson MD       Subjective:   Piotr Jj Recent Labs   Lab 08/28/19  1323  09/02/19  0454 09/03/19  0512 09/04/19  0541   GLU 83   < > 122* 110* 98   BUN 17   < > 12 11 13   CREATSERUM 1.09   < > 1.06 0.94 0.84   GFRAA 92   < > 95 110 119   GFRNAA 79   < > 82 95 103   CA 9.1   < > 8.2* 8.5

## 2019-09-04 NOTE — CM/SW NOTE
[place] [phone]  has accepted patient. Will need AVS and notice of discharge.     Kristin Arias, RO RN, CTL/  P: [phone]

## 2019-09-05 ENCOUNTER — APPOINTMENT (OUTPATIENT)
Dept: GENERAL RADIOLOGY | Facility: HOSPITAL | Age: 49
DRG: 330 | End: 2019-09-05
Attending: SURGERY
Payer: COMMERCIAL

## 2019-09-05 LAB
ANION GAP SERPL CALC-SCNC: 6 MMOL/L (ref 0–18)
BASOPHILS # BLD AUTO: 0.06 X10(3) UL (ref 0–0.2)
BASOPHILS NFR BLD AUTO: 0.6 %
BUN BLD-MCNC: 13 MG/DL (ref 7–18)
BUN/CREAT SERPL: 12.6 (ref 10–20)
CALCIUM BLD-MCNC: 8.7 MG/DL (ref 8.5–10.1)
CHLORIDE SERPL-SCNC: 105 MMOL/L (ref 98–112)
CO2 SERPL-SCNC: 27 MMOL/L (ref 21–32)
CREAT BLD-MCNC: 1.03 MG/DL (ref 0.7–1.3)
DEPRECATED RDW RBC AUTO: 43.8 FL (ref 35.1–46.3)
EOSINOPHIL # BLD AUTO: 0.66 X10(3) UL (ref 0–0.7)
EOSINOPHIL NFR BLD AUTO: 6.4 %
ERYTHROCYTE [DISTWIDTH] IN BLOOD BY AUTOMATED COUNT: 12.8 % (ref 11–15)
GLUCOSE BLD-MCNC: 95 MG/DL (ref 70–99)
HAV IGM SER QL: 2.1 MG/DL (ref 1.6–2.6)
HCT VFR BLD AUTO: 32.4 % (ref 39–53)
HGB BLD-MCNC: 10.5 G/DL (ref 13–17.5)
IMM GRANULOCYTES # BLD AUTO: 0.07 X10(3) UL (ref 0–1)
IMM GRANULOCYTES NFR BLD: 0.7 %
LYMPHOCYTES # BLD AUTO: 1.81 X10(3) UL (ref 1–4)
LYMPHOCYTES NFR BLD AUTO: 17.4 %
MCH RBC QN AUTO: 30.4 PG (ref 26–34)
MCHC RBC AUTO-ENTMCNC: 32.4 G/DL (ref 31–37)
MCV RBC AUTO: 93.9 FL (ref 80–100)
MONOCYTES # BLD AUTO: 0.76 X10(3) UL (ref 0.1–1)
MONOCYTES NFR BLD AUTO: 7.3 %
NEUTROPHILS # BLD AUTO: 7.03 X10 (3) UL (ref 1.5–7.7)
NEUTROPHILS # BLD AUTO: 7.03 X10(3) UL (ref 1.5–7.7)
NEUTROPHILS NFR BLD AUTO: 67.6 %
OSMOLALITY SERPL CALC.SUM OF ELEC: 286 MOSM/KG (ref 275–295)
PHOSPHATE SERPL-MCNC: 3.4 MG/DL (ref 2.5–4.9)
PLATELET # BLD AUTO: 369 10(3)UL (ref 150–450)
POTASSIUM SERPL-SCNC: 3.4 MMOL/L (ref 3.5–5.1)
RBC # BLD AUTO: 3.45 X10(6)UL (ref 4.3–5.7)
SODIUM SERPL-SCNC: 138 MMOL/L (ref 136–145)
WBC # BLD AUTO: 10.4 X10(3) UL (ref 4–11)

## 2019-09-05 PROCEDURE — 51600 INJECTION FOR BLADDER X-RAY: CPT | Performed by: SURGERY

## 2019-09-05 PROCEDURE — 99233 SBSQ HOSP IP/OBS HIGH 50: CPT | Performed by: HOSPITALIST

## 2019-09-05 PROCEDURE — 74430 CONTRAST X-RAY BLADDER: CPT | Performed by: SURGERY

## 2019-09-05 NOTE — PROGRESS NOTES
[facility]    Progress Note    Mary Frazier Patient Status:  Inpatient    1970 MRN [id number]   Location [facility] 4W/SW/SE Attending Vahe Rosado, [address] [facility] Day # 8 PCP Dominique Ghosh MD       Subjective:   Katharine Reyes RDW 13.0 12.9 12.8   NEPRELIM 8.23* 6.55 7.03   WBC 11.7* 10.0 10.4   .0 365.0 369.0     Lab Results   Component Value Date    INR 1.25 (H) 08/31/2019       Recent Labs   Lab 09/03/19  0512 09/04/19  0541 09/05/19  0534   * 98 95   BUN 11 1

## 2019-09-05 NOTE — PLAN OF CARE
Continue  ng to lis, tolerating ice chips, no nausea, up to chair and ambulating in hallway, shelby drains, little output with colostomy, passing gas, dressing changed to abdomen.   Problem: Patient Centered Care  Goal: Patient preferences are identified and in Progressing     Problem: RISK FOR INFECTION - ADULT  Goal: Absence of fever/infection during anticipated neutropenic period  Description  INTERVENTIONS  - Monitor WBC  - Administer growth factors as ordered  - Implement neutropenic guidelines  Outcome: Pro PO as ordered and tolerated  - Nasogastric tube to low intermittent suction as ordered  - Evaluate effectiveness of ordered antiemetic medications  - Provide nonpharmacologic comfort measures as appropriate  - Advance diet as tolerated, if ordered  - Obtai

## 2019-09-05 NOTE — PLAN OF CARE
No acute events overnight. Incision is C/D/I, dressing changed. JPx2 intact. Colostomy intact, minimal output but per patient is passing a lot of gas. Minimal complaints of pain, has morphine PCA but patient rarely using. Scheduled ofrimev given.  NG to LIS Manage/alleviate anxiety  - Utilize distraction and/or relaxation techniques  - Monitor for opioid side effects  - Notify MD/LIP if interventions unsuccessful or patient reports new pain  - Anticipate increased pain with activity and pre-medicate as approp or complex needs related to functional status, cognitive ability or social support system  Outcome: Progressing     Problem: GASTROINTESTINAL - ADULT  Goal: Minimal or absence of nausea and vomiting  Description  INTERVENTIONS:  - Maintain adequate hydrati

## 2019-09-05 NOTE — WOUND PROGRESS NOTE
POD 5. Pt up in chair. Able to ambulate to bathroom with 1 assist and walker. Spouse at bedside. Stoma on LLQ: edematous, red, moist.   Pt wears a 2-piece Convatec appliance 2 3/4\" moldable with a flat barrier and a pouch with integrated closure.  [facility]

## 2019-09-05 NOTE — PAYOR COMM NOTE
--------------  CONTINUED STAY REVIEW    [facility]  Subscriber #:  [id number]  Authorization Number: [id number]    Admit date: 8/28/19  Admit time: 5482    Admitting Physician: Clotilde Samuels MD  Attending Physician:  Sandra Akers MD Nancie Fajardo MD   8/29/2019 12:43 PM     Blood pressure (!) 133/92, pulse 68, temperature 98.4 °F (36.9 °C), temperature source Oral, resp. rate 18, height 5' 10\" (1.778 m), weight 253 lb 3.2 oz (114.9 kg), SpO2 98 %.   More pain today    Component Value Scheduled for exploratory laparotomy, colon resection and possible diversion tomorrow with Dr. Sonja Marie.   Cystoscopy and bilateral stent placements intraoperative catheters will be performed to assist in intraoperative identification of the ureters as per Total time spent in direct patient contact and decision-making  And coordinating the patient's care including greater than 50% face-to-face was  25  minutes.   [address]  SURGERY  8/30/2019  8:50 AM       MEDICATIONS ADMINISTERE (1400)-Not Given [C]     1810-New Bag            Piperacillin Sod-Tazobactam So (ZOSYN) 4.5 g in dextrose 5 % 100 mL ADD-vantage   Dose: 4.5 g  Freq:  Once Route: IV  Last Dose: Stopped (08/28/19 4651)  Start: 08/28/19 1329 End: 08/28/19 1359    Order spec vancomycin HCl ANNIKA MOHR CTR) 50 MG/ML oral solution 125 mg   Dose: 125 mg  Freq: Daily Route: Per NG Tube  Start: 09/02/19 0915    Order specific questions:   Indication for use: C.  Diff prophylaxis         Medications 08/29/19 08/30/19 08/31/19       Continuo 1442-Given           0659-MAR Hold   1559-MAR Unhold   1559-D/C'd      Atropine Sulfate 0.1 MG/ML injection 0.5 mg   Dose: 0.5 mg  Freq: As needed Route: IV  PRN Comment: for symptomatic bradycardia/pre-emergent situation  Start: 08/31/19 0719 End: 08/31/ HYDROmorphone HCl (DILAUDID) 1 MG/ML injection 0.4 mg   Dose: 0.4 mg  Freq: Every 5 min PRN Route: IV  PRN Reason: severe pain  PRN Comment: for pain score 4-6 up to a maximum of 3 mg  Start: 08/31/19 0719 End: 08/31/19 1616      1616-D/C'antonio Bone Freq: Every 10 min PRN Route: IV  PRN Reason: mild pain  PRN Comment: for pain 1-3, maximum dose of 10mg  Start: 08/31/19 0719 End: 08/31/19 1613      1613-D/C'd          morphINE sulfate (PF) 4 MG/ML injection 4 mg   Dose: 4 mg  Freq: Every 10 min PRN Rou

## 2019-09-05 NOTE — PROGRESS NOTES
[place] [facility]    Progress Note    Atul Cardoza Patient Status:  Inpatient    1970 MRN [id number]   Location [facility] 4W/SW/SE Attending Rajendra Tillman, [address] [facility] Day # 8 PCP Christy Peck MD       Subjective:   Lebron Walters 09/05/2019    CO2 27.0 09/05/2019    GLU 95 09/05/2019    CA 8.7 09/05/2019    ALB 2.4 (L) 09/03/2019    ALKPHO 35 (L) 09/03/2019    BILT 0.5 09/03/2019    TP 6.0 (L) 09/03/2019    AST 53 (H) 09/03/2019    ALT 37 09/03/2019    INR 1.25 (H) 08/31/2019    MG zosyn. Also baceroides, and clostridium  - cont oral vanco for prophylaxis against c diff   - henry in place, plan cystogram prior to removal likely today  - ivfs    Hypernatremia - change ivfs to d5w at 100.  Check bmp in am ->improved    Hypophos - improv

## 2019-09-06 ENCOUNTER — APPOINTMENT (OUTPATIENT)
Dept: GENERAL RADIOLOGY | Facility: HOSPITAL | Age: 49
DRG: 330 | End: 2019-09-06
Attending: PHYSICIAN ASSISTANT
Payer: COMMERCIAL

## 2019-09-06 LAB
ANION GAP SERPL CALC-SCNC: 8 MMOL/L (ref 0–18)
BASOPHILS # BLD AUTO: 0.07 X10(3) UL (ref 0–0.2)
BASOPHILS NFR BLD AUTO: 0.7 %
BUN BLD-MCNC: 12 MG/DL (ref 7–18)
BUN/CREAT SERPL: 10.8 (ref 10–20)
CALCIUM BLD-MCNC: 8.8 MG/DL (ref 8.5–10.1)
CHLORIDE SERPL-SCNC: 104 MMOL/L (ref 98–112)
CO2 SERPL-SCNC: 27 MMOL/L (ref 21–32)
CREAT BLD-MCNC: 1.11 MG/DL (ref 0.7–1.3)
DEPRECATED RDW RBC AUTO: 45.7 FL (ref 35.1–46.3)
EOSINOPHIL # BLD AUTO: 0.43 X10(3) UL (ref 0–0.7)
EOSINOPHIL NFR BLD AUTO: 4.3 %
ERYTHROCYTE [DISTWIDTH] IN BLOOD BY AUTOMATED COUNT: 13 % (ref 11–15)
GLUCOSE BLD-MCNC: 100 MG/DL (ref 70–99)
HAV IGM SER QL: 2.1 MG/DL (ref 1.6–2.6)
HCT VFR BLD AUTO: 34 % (ref 39–53)
HGB BLD-MCNC: 11 G/DL (ref 13–17.5)
IMM GRANULOCYTES # BLD AUTO: 0.14 X10(3) UL (ref 0–1)
IMM GRANULOCYTES NFR BLD: 1.4 %
LYMPHOCYTES # BLD AUTO: 1.73 X10(3) UL (ref 1–4)
LYMPHOCYTES NFR BLD AUTO: 17.2 %
MCH RBC QN AUTO: 30.9 PG (ref 26–34)
MCHC RBC AUTO-ENTMCNC: 32.4 G/DL (ref 31–37)
MCV RBC AUTO: 95.5 FL (ref 80–100)
MONOCYTES # BLD AUTO: 0.9 X10(3) UL (ref 0.1–1)
MONOCYTES NFR BLD AUTO: 8.9 %
NEUTROPHILS # BLD AUTO: 6.8 X10 (3) UL (ref 1.5–7.7)
NEUTROPHILS # BLD AUTO: 6.8 X10(3) UL (ref 1.5–7.7)
NEUTROPHILS NFR BLD AUTO: 67.5 %
OSMOLALITY SERPL CALC.SUM OF ELEC: 288 MOSM/KG (ref 275–295)
PHOSPHATE SERPL-MCNC: 3.9 MG/DL (ref 2.5–4.9)
PLATELET # BLD AUTO: 395 10(3)UL (ref 150–450)
POTASSIUM SERPL-SCNC: 3.8 MMOL/L (ref 3.5–5.1)
RBC # BLD AUTO: 3.56 X10(6)UL (ref 4.3–5.7)
SODIUM SERPL-SCNC: 139 MMOL/L (ref 136–145)
WBC # BLD AUTO: 10.1 X10(3) UL (ref 4–11)

## 2019-09-06 PROCEDURE — 99233 SBSQ HOSP IP/OBS HIGH 50: CPT | Performed by: HOSPITALIST

## 2019-09-06 PROCEDURE — 74019 RADEX ABDOMEN 2 VIEWS: CPT | Performed by: PHYSICIAN ASSISTANT

## 2019-09-06 PROCEDURE — BT101ZZ FLUOROSCOPY OF BLADDER USING LOW OSMOLAR CONTRAST: ICD-10-PCS | Performed by: RADIOLOGY

## 2019-09-06 RX ORDER — MORPHINE SULFATE 2 MG/ML
2 INJECTION, SOLUTION INTRAMUSCULAR; INTRAVENOUS
Status: DISCONTINUED | OUTPATIENT
Start: 2019-09-06 | End: 2019-09-08

## 2019-09-06 RX ORDER — POTASSIUM CHLORIDE 14.9 MG/ML
20 INJECTION INTRAVENOUS ONCE
Status: COMPLETED | OUTPATIENT
Start: 2019-09-06 | End: 2019-09-06

## 2019-09-06 RX ORDER — METOCLOPRAMIDE HYDROCHLORIDE 5 MG/ML
10 INJECTION INTRAMUSCULAR; INTRAVENOUS EVERY 6 HOURS
Status: COMPLETED | OUTPATIENT
Start: 2019-09-06 | End: 2019-09-08

## 2019-09-06 RX ORDER — TRAMADOL HYDROCHLORIDE 50 MG/1
50 TABLET ORAL EVERY 6 HOURS PRN
Qty: 20 TABLET | Refills: 0 | Status: SHIPPED | OUTPATIENT
Start: 2019-09-06 | End: 2019-12-11

## 2019-09-06 RX ORDER — AMOXICILLIN AND CLAVULANATE POTASSIUM 875; 125 MG/1; MG/1
1 TABLET, FILM COATED ORAL 2 TIMES DAILY
Qty: 14 TABLET | Refills: 0 | Status: SHIPPED | OUTPATIENT
Start: 2019-09-06 | End: 2019-09-13

## 2019-09-06 RX ORDER — TRAMADOL HYDROCHLORIDE 50 MG/1
50 TABLET ORAL EVERY 6 HOURS PRN
Status: DISCONTINUED | OUTPATIENT
Start: 2019-09-06 | End: 2019-09-08

## 2019-09-06 RX ORDER — MORPHINE SULFATE 4 MG/ML
4 INJECTION, SOLUTION INTRAMUSCULAR; INTRAVENOUS
Status: DISCONTINUED | OUTPATIENT
Start: 2019-09-06 | End: 2019-09-08

## 2019-09-06 RX ORDER — FUROSEMIDE 10 MG/ML
20 INJECTION INTRAMUSCULAR; INTRAVENOUS ONCE
Status: COMPLETED | OUTPATIENT
Start: 2019-09-06 | End: 2019-09-06

## 2019-09-06 NOTE — DIETARY NOTE
Educated pt on diet with colostomy. Discussed progression as tolerated approx 4-6 weeks. Handout and contact information provided.       [address], [address] [facility]   Clinical Dietitian [id number]

## 2019-09-06 NOTE — PROGRESS NOTES
[facility]    Progress Note    Sergio Spicer Patient Status:  Inpatient    1970 MRN [id number]   Location [facility] 4W/SW/SE Attending Sallee Dance, [address] [facility] Day # 9 PCP Katalina Thompson MD       Subjective:   Renate Davis weight 253 lb 3.2 oz (114.9 kg), SpO2 96 %. I/O last 3 completed shifts:   In: 3163 [P.O.:440; I.V.:2038; NG/GT:60; IV PIGGYBACK:625]  Out: 6756 [DXJX; Emesis/NG output:1070; Drains:222; Stool:210]     General appearance: alert, appears stated age and Yosvany Moy MD on 9/05/2019 at 12:18     Approved by (CST): Elizabeth Mandel MD on 9/05/2019 at 12:22

## 2019-09-06 NOTE — PLAN OF CARE
Patient with complaints of nausea at beginning of shift managed with PRN zofran, no vomiting noted. Incision is C/D/I, dressing changed. JPx2 intact. Colostomy intact, minimal output but per patient is passing a lot of gas.  Minimal complaints of pain, has relaxation techniques  - Monitor for opioid side effects  - Notify MD/LIP if interventions unsuccessful or patient reports new pain  - Anticipate increased pain with activity and pre-medicate as appropriate  Outcome: Progressing     Problem: RISK FOR INFEC cognitive ability or social support system  Outcome: Progressing     Problem: GASTROINTESTINAL - ADULT  Goal: Minimal or absence of nausea and vomiting  Description  INTERVENTIONS:  - Maintain adequate hydration with IV or PO as ordered and tolerated  - Na

## 2019-09-06 NOTE — WOUND PROGRESS NOTE
Wound and Ostomy Care Services  Follow up on the pt. he is up in the chair, his spouse is at the bedside, his NG tube is out, he states he is feeling better. Dr. Rusty Miranda was in and he saw the pt.  The pt. will change his appliance later for additional pra

## 2019-09-06 NOTE — PAYOR COMM NOTE
REF: [id number] APPROVED THROUGH 9/5/19, FAXING CLINICAL UPDATE FOR 9/6/19 9/6/19  Progress Note       Subjective:   Kaleigh Neff is a(n) 52year old   male feels better     Assessment and Plan:   Kaleigh Neff is a(n) 52year old male    POD # lower quadrant colostomy pink viable, wound clean. No evidence of infection   drains serous fluid, stoma with stool present.   Extremities: extremities normal, atraumatic, no cyanosis or edema                   Lab 09/04/19  0541 09/05/19  0534 09/06/19  0 Heparin Sodium (Porcine) 5000 UNIT/ML injection 5,000 Units       Date Action Dose Route     9/6/2019 0948 Given 5000 Units Subcutaneous (Right Upper Arm)     9/5/2019 2137 Given 5000 Units Subcutaneous (Right Upper Arm)           ondansetron HCl (ZO

## 2019-09-06 NOTE — PLAN OF CARE
Problem: Patient/Family Goals  Goal: Patient/Family Long Term Goal  Description  Patient's Long Term Goal: Go home    Interventions:    - See additional Care Plan goals for specific interventions   Outcome: Progressing  Goal: Patient/Family Short Term Go devices as appropriate  - Consider OT/PT consult to assist with strengthening/mobility  - Encourage toileting schedule  Outcome: Progressing     Problem: DISCHARGE PLANNING  Goal: Discharge to home or other facility with appropriate resources  Description

## 2019-09-06 NOTE — PROGRESS NOTES
[facility]    Progress Note    Barbie Issa Patient Status:  Inpatient    1970 MRN [id number]   Location [facility] 4W/SW/SE Attending Rodriguez Rodriguez, [address] [facility] Day # 9 PCP Saturnino Amaya MD       Subjective:   Albina Whittaker 34.0 (L) 09/06/2019    .0 09/06/2019    CREATSERUM 1.11 09/06/2019    BUN 12 09/06/2019     09/06/2019    K 3.8 09/06/2019     09/06/2019    CO2 27.0 09/06/2019     (H) 09/06/2019    CA 8.8 09/06/2019    ALB 2.4 (L) 09/03/2019 8.7 8.8    138 139   K 3.6 3.4* 3.8    105 104   CO2 27.0 27.0 27.0             Assessment and Plan:       ASSESSMENT AND PLAN:    Perforated sigmoid diverticulitis with pericolonic abscess located between the bladder dome and the inferior aspe

## 2019-09-06 NOTE — WOUND PROGRESS NOTE
Wound and Ostomy Care Services  Follow up to see the pt. to continue teaching, he is back from his xray in the chair, his spouse is at the bedside, per the pt. he has been passing gas. We discussed foods that can cause gas.  I showed the pt. how to muffle t

## 2019-09-07 VITALS
OXYGEN SATURATION: 96 % | DIASTOLIC BLOOD PRESSURE: 76 MMHG | TEMPERATURE: 98 F | WEIGHT: 229.19 LBS | SYSTOLIC BLOOD PRESSURE: 119 MMHG | HEART RATE: 70 BPM | BODY MASS INDEX: 32.81 KG/M2 | HEIGHT: 70 IN | RESPIRATION RATE: 20 BRPM

## 2019-09-07 LAB
ANION GAP SERPL CALC-SCNC: 7 MMOL/L (ref 0–18)
BASOPHILS # BLD AUTO: 0.12 X10(3) UL (ref 0–0.2)
BASOPHILS NFR BLD AUTO: 1.2 %
BUN BLD-MCNC: 12 MG/DL (ref 7–18)
BUN/CREAT SERPL: 10.6 (ref 10–20)
CALCIUM BLD-MCNC: 8.6 MG/DL (ref 8.5–10.1)
CHLORIDE SERPL-SCNC: 103 MMOL/L (ref 98–112)
CO2 SERPL-SCNC: 29 MMOL/L (ref 21–32)
CREAT BLD-MCNC: 1.13 MG/DL (ref 0.7–1.3)
DEPRECATED RDW RBC AUTO: 45.1 FL (ref 35.1–46.3)
EOSINOPHIL # BLD AUTO: 0.49 X10(3) UL (ref 0–0.7)
EOSINOPHIL NFR BLD AUTO: 4.7 %
ERYTHROCYTE [DISTWIDTH] IN BLOOD BY AUTOMATED COUNT: 13.2 % (ref 11–15)
GLUCOSE BLD-MCNC: 95 MG/DL (ref 70–99)
HAV IGM SER QL: 2.1 MG/DL (ref 1.6–2.6)
HCT VFR BLD AUTO: 33.3 % (ref 39–53)
HGB BLD-MCNC: 10.7 G/DL (ref 13–17.5)
IMM GRANULOCYTES # BLD AUTO: 0.2 X10(3) UL (ref 0–1)
IMM GRANULOCYTES NFR BLD: 1.9 %
LYMPHOCYTES # BLD AUTO: 1.83 X10(3) UL (ref 1–4)
LYMPHOCYTES NFR BLD AUTO: 17.7 %
MCH RBC QN AUTO: 30.7 PG (ref 26–34)
MCHC RBC AUTO-ENTMCNC: 32.1 G/DL (ref 31–37)
MCV RBC AUTO: 95.7 FL (ref 80–100)
MONOCYTES # BLD AUTO: 1.06 X10(3) UL (ref 0.1–1)
MONOCYTES NFR BLD AUTO: 10.3 %
NEUTROPHILS # BLD AUTO: 6.64 X10 (3) UL (ref 1.5–7.7)
NEUTROPHILS # BLD AUTO: 6.64 X10(3) UL (ref 1.5–7.7)
NEUTROPHILS NFR BLD AUTO: 64.2 %
OSMOLALITY SERPL CALC.SUM OF ELEC: 288 MOSM/KG (ref 275–295)
PHOSPHATE SERPL-MCNC: 3.8 MG/DL (ref 2.5–4.9)
PLATELET # BLD AUTO: 394 10(3)UL (ref 150–450)
POTASSIUM SERPL-SCNC: 3.6 MMOL/L (ref 3.5–5.1)
RBC # BLD AUTO: 3.48 X10(6)UL (ref 4.3–5.7)
SODIUM SERPL-SCNC: 139 MMOL/L (ref 136–145)
WBC # BLD AUTO: 10.3 X10(3) UL (ref 4–11)

## 2019-09-07 PROCEDURE — 99233 SBSQ HOSP IP/OBS HIGH 50: CPT | Performed by: HOSPITALIST

## 2019-09-07 RX ORDER — POTASSIUM CHLORIDE 1.5 G/1.77G
40 POWDER, FOR SOLUTION ORAL EVERY 4 HOURS
Status: DISCONTINUED | OUTPATIENT
Start: 2019-09-07 | End: 2019-09-07

## 2019-09-07 RX ORDER — POTASSIUM CHLORIDE 20 MEQ/1
40 TABLET, EXTENDED RELEASE ORAL EVERY 4 HOURS
Status: COMPLETED | OUTPATIENT
Start: 2019-09-07 | End: 2019-09-07

## 2019-09-07 NOTE — PLAN OF CARE
Pain controlled with IV Ofirmev. Denies nausea, tolerating clear liquids, will advance to fulls this morning. Ambulating freely.   Problem: Patient/Family Goals  Goal: Patient/Family Long Term Goal  Description  Patient's Long Term Goal: Go home    Petrona Sen schedule  Outcome: Progressing     Problem: DISCHARGE PLANNING  Goal: Discharge to home or other facility with appropriate resources  Description  INTERVENTIONS:  - Identify barriers to discharge w/pt and caregiver  - Include patient/family/discharge partn

## 2019-09-07 NOTE — PROGRESS NOTES
[facility]    Progress Note    Fredy Goldberg Patient Status:  Inpatient    1970 MRN [id number]   Location [facility] 4W/SW/SE Attending Maryln Denver, [address] [facility] Day # 10 PCP Andra Sheffield MD       Subjective:   Sandy Shaikh regular rate and rhythm  Abdominal: soft, mildly distended nontender; no masses,  no organomegaly and left lower quadrant colostomy pink viable, wound clean. No evidence of infection   drains serous fluid, stoma with stool present.   Extremities: extremiti contrast in the left lower quadrant colostomy. No definite bowel obstruction. Findings may suggest mild ileus. No constipation or significant retained fecal material noted.     Dictated by (CST): Madeleine Raman MD on 9/06/2019 at 9:43     Approved by (CS

## 2019-09-08 LAB
ANION GAP SERPL CALC-SCNC: 5 MMOL/L (ref 0–18)
BASOPHILS # BLD AUTO: 0.1 X10(3) UL (ref 0–0.2)
BASOPHILS NFR BLD AUTO: 1 %
BUN BLD-MCNC: 10 MG/DL (ref 7–18)
BUN/CREAT SERPL: 9 (ref 10–20)
CALCIUM BLD-MCNC: 8.8 MG/DL (ref 8.5–10.1)
CHLORIDE SERPL-SCNC: 106 MMOL/L (ref 98–112)
CO2 SERPL-SCNC: 28 MMOL/L (ref 21–32)
CREAT BLD-MCNC: 1.11 MG/DL (ref 0.7–1.3)
DEPRECATED RDW RBC AUTO: 45.7 FL (ref 35.1–46.3)
EOSINOPHIL # BLD AUTO: 0.65 X10(3) UL (ref 0–0.7)
EOSINOPHIL NFR BLD AUTO: 6.6 %
ERYTHROCYTE [DISTWIDTH] IN BLOOD BY AUTOMATED COUNT: 13.1 % (ref 11–15)
GLUCOSE BLD-MCNC: 88 MG/DL (ref 70–99)
HAV IGM SER QL: 2.1 MG/DL (ref 1.6–2.6)
HCT VFR BLD AUTO: 34.3 % (ref 39–53)
HGB BLD-MCNC: 11.1 G/DL (ref 13–17.5)
IMM GRANULOCYTES # BLD AUTO: 0.24 X10(3) UL (ref 0–1)
IMM GRANULOCYTES NFR BLD: 2.5 %
LYMPHOCYTES # BLD AUTO: 2.07 X10(3) UL (ref 1–4)
LYMPHOCYTES NFR BLD AUTO: 21.2 %
MCH RBC QN AUTO: 31.1 PG (ref 26–34)
MCHC RBC AUTO-ENTMCNC: 32.4 G/DL (ref 31–37)
MCV RBC AUTO: 96.1 FL (ref 80–100)
MONOCYTES # BLD AUTO: 0.93 X10(3) UL (ref 0.1–1)
MONOCYTES NFR BLD AUTO: 9.5 %
NEUTROPHILS # BLD AUTO: 5.79 X10 (3) UL (ref 1.5–7.7)
NEUTROPHILS # BLD AUTO: 5.79 X10(3) UL (ref 1.5–7.7)
NEUTROPHILS NFR BLD AUTO: 59.2 %
OSMOLALITY SERPL CALC.SUM OF ELEC: 286 MOSM/KG (ref 275–295)
PHOSPHATE SERPL-MCNC: 3.8 MG/DL (ref 2.5–4.9)
PLATELET # BLD AUTO: 407 10(3)UL (ref 150–450)
POTASSIUM SERPL-SCNC: 3.8 MMOL/L (ref 3.5–5.1)
RBC # BLD AUTO: 3.57 X10(6)UL (ref 4.3–5.7)
SODIUM SERPL-SCNC: 139 MMOL/L (ref 136–145)
WBC # BLD AUTO: 9.8 X10(3) UL (ref 4–11)

## 2019-09-08 PROCEDURE — 99239 HOSP IP/OBS DSCHRG MGMT >30: CPT | Performed by: HOSPITALIST

## 2019-09-08 RX ORDER — ACETAMINOPHEN 500 MG
500 TABLET ORAL EVERY 6 HOURS PRN
Qty: 30 TABLET | Refills: 0 | Status: SHIPPED | OUTPATIENT
Start: 2019-09-08 | End: 2019-12-12

## 2019-09-08 RX ORDER — POTASSIUM CHLORIDE 20 MEQ/1
40 TABLET, EXTENDED RELEASE ORAL ONCE
Status: COMPLETED | OUTPATIENT
Start: 2019-09-08 | End: 2019-09-08

## 2019-09-08 RX ORDER — ACETAMINOPHEN 500 MG
500 TABLET ORAL EVERY 6 HOURS PRN
Status: DISCONTINUED | OUTPATIENT
Start: 2019-09-08 | End: 2019-09-08

## 2019-09-08 NOTE — PROGRESS NOTES
[facility]    Progress Note    [facility]:  [facility]    1970 MRN [id number]   Location [facility] 4W/SW/SE Attending Darron Ramos, [address] [facility] Day # 11 PCP Aimee Garcia MD       Subjective:   Cbs Spotted lower quadrant colostomy pink viable, wound clean. No evidence of infection   Drain serous fluid, stoma with stool present.   Wound with minimal opening     Extremities: extremities normal, atraumatic, no cyanosis or edema      Results:       Recent Labs

## 2019-09-08 NOTE — PROGRESS NOTES
[facility]    Progress Note    Jose Gardner Patient Status:  Inpatient    1970 MRN [id number]   Location [facility] 4W/SW/SE Attending Andrew Plaza Day # 10 PCP Loren Shah MD        Subjective: clostridium, plan home with oral augmentin poss 9/8  - cont oral vanco for prophylaxis against c diff   - henry removed     Hypernatremia - change ivfs to d5w at 100.  Check bmp in am ->improved, stop when tolerating diet     Hypophos - improved     LUIS DANIEL - c

## 2019-09-08 NOTE — PLAN OF CARE
Patient alert and oriented. On room air. SQ Heparin and SCDs. Patient voids and had a BM via colostomy. Pain managed with scheduled Tylenol. Denies nausea and vomiting. Up independently and walks frequently. With right JES drain and left colostomy.  Plan to non-pharmacological measures as appropriate and evaluate response  - Consider cultural and social influences on pain and pain management  - Manage/alleviate anxiety  - Utilize distraction and/or relaxation techniques  - Monitor for opioid side effects  - N Refer to Case Management Department for coordinating discharge planning if the patient needs post-hospital services based on physician/LIP order or complex needs related to functional status, cognitive ability or social support system  Outcome: Progressing

## 2019-09-09 NOTE — PAYOR COMM NOTE
--------------  CONTINUED STAY REVIEW    [facility])  Subscriber #:  [id number]  Authorization Number: [id number]    Admit date: 8/28/19  Admit time: 12    Admitting Physician: Osvaldo Casanova MD  Attending Physician:  Mavis att. providers f Lab 09/03/19  0512   09/05/19  0534 09/06/19  0527 09/07/19  0542   *   < > 95 100* 95   BUN 11   < > 13 12 12   CREATSERUM 0.94   < > 1.03 1.11 1.13   GFRAA 110   < > 98 90 88   GFRNAA 95   < > 85 78 76   CA 8.5   < > 8.7 8.8 8.6   ALB 2.4*  --   - Which infection is this being used to treat? Intra-abdominal infection  What is the anticipated duration of therapy?  3-5 days    0513-New Bag   1359-New Bag   2121-New Bag          0455-New Bag   1245-New Bag   1621-D/C'd          potassium chloride (KLOR-

## 2019-09-09 NOTE — PAYOR COMM NOTE
REQUESTING 11 DAYS INPT  DISCHARGE REVIEW    Payor: [facility]  Subscriber #:  [id number]  Authorization Number: [id number]    Admit date: 8/28/19  Admit time:  3000  Discharge Date: 9/8/2019  2:21 PM     Admitting Physician: Berta Campos MD

## 2019-09-11 NOTE — DISCHARGE SUMMARY
[facility]    PATIENT'S NAME: [id number] [facility]   ATTENDING PHYSICIAN: Adrian Plaza MD   PATIENT ACCOUNT#:   [id number]    LOCATION:  [address] [place] [address] RECORD #:   [id number]       YOB: 1970  ADMISSION DATE:       08 final pathology. Reversal of colostomy after 90 days. Full colonoscopy and another evaluation when stable by GI. Continue Augmentin. 2.   Obesity, body mass index 32.85. May need obstructive sleep apnea workup. 3.   Hypernatremia. Now improved.   4.

## 2019-09-16 NOTE — OPERATIVE REPORT
[facility]    PATIENT'S NAME: [address] [facility]   ATTENDING PHYSICIAN: Adrian Plaza MD   OPERATING PHYSICIAN: Parish Mederos MD   PATIENT ACCOUNT#:   [de-identified]    LOCATION:  [address] [place] [address] RECORD #:   [id number]       DA the operating room, placed on the operating table in supine position. General anesthetic was administered via endotracheal tube by Anesthesia. The patient was placed in lithotomy position, padding all pressure points.   The abdomen and perineum were prepp The mesentery was then clamped, divided, and ligated with 0 silk ties. The specimen was removed off the field. The specimen was opened, revealing severe high-grade diverticular stricture without evidence of malignancy present.   At this time, the abscess irrigated with saline solution. The rectum was insufflated with proctoscope. The rectum was approximately 12 cm of length up to the staple line allowing adequate length for closure of the colostomy in the future.   The rectal stump was marked with 0 Prole were brought out through separate stab wounds in the right lower quadrant. The omentum was placed over the small bowel. The fascia was then closed in the midline using 0 looped PDS running suture in multiple short segments.   Subcutaneous tissue was reappr

## 2019-11-11 ENCOUNTER — TELEPHONE (OUTPATIENT)
Dept: SURGERY | Facility: CLINIC | Age: 49
End: 2019-11-11

## 2019-11-11 DIAGNOSIS — K63.1 PERFORATED SIGMOID COLON (HCC): Primary | ICD-10-CM

## 2019-11-11 NOTE — TELEPHONE ENCOUNTER
Per Cheikh May she has attempted to call a few times to schedule surgery for 12/19 ureteral stent placement, prior to procedure with   Dr. Chana White. Please call thank you.

## 2019-11-12 ENCOUNTER — TELEPHONE (OUTPATIENT)
Dept: SURGERY | Facility: CLINIC | Age: 49
End: 2019-11-12

## 2019-11-12 NOTE — TELEPHONE ENCOUNTER
[employer],  Dr Una Webster from [facility] wants lighted ureteral stents placed for this pt. Please coordinate with his office date/time and add to my schedule.   Thanks

## 2019-11-18 NOTE — TELEPHONE ENCOUNTER
Called Monika back, confirmed that Tiffanie Hall will assist Sheldon Mercadoers, with ureteral lighted stents, Thursday 12/19/19. Thanks.

## 2019-12-05 LAB — COLONOSCOPY STUDY: NORMAL

## 2019-12-05 PROCEDURE — 88305 TISSUE EXAM BY PATHOLOGIST: CPT | Performed by: INTERNAL MEDICINE

## 2019-12-09 ENCOUNTER — HOSPITAL ENCOUNTER (OUTPATIENT)
Dept: GENERAL RADIOLOGY | Facility: HOSPITAL | Age: 49
Discharge: HOME | End: 2019-12-09
Attending: SURGERY
Payer: COMMERCIAL

## 2019-12-09 DIAGNOSIS — K63.1 PERFORATED SIGMOID COLON (HCC): ICD-10-CM

## 2019-12-09 PROCEDURE — 74270 X-RAY XM COLON 1CNTRST STD: CPT | Performed by: SURGERY

## 2019-12-11 ENCOUNTER — LAB ENCOUNTER (OUTPATIENT)
Dept: LAB | Facility: HOSPITAL | Age: 49
End: 2019-12-11
Attending: SURGERY
Payer: COMMERCIAL

## 2019-12-11 DIAGNOSIS — K63.1 PERFORATED SIGMOID COLON (HCC): ICD-10-CM

## 2019-12-11 DIAGNOSIS — Z01.818 PREOPERATIVE TESTING: ICD-10-CM

## 2019-12-11 PROCEDURE — 80053 COMPREHEN METABOLIC PANEL: CPT

## 2019-12-11 PROCEDURE — 86900 BLOOD TYPING SEROLOGIC ABO: CPT

## 2019-12-11 PROCEDURE — 36415 COLL VENOUS BLD VENIPUNCTURE: CPT

## 2019-12-11 PROCEDURE — 86850 RBC ANTIBODY SCREEN: CPT

## 2019-12-11 PROCEDURE — 86901 BLOOD TYPING SEROLOGIC RH(D): CPT

## 2019-12-11 PROCEDURE — 85025 COMPLETE CBC W/AUTO DIFF WBC: CPT

## 2019-12-11 NOTE — H&P (VIEW-ONLY)
Marlyn Capps is a 52year old male. Patient presents with:   Follow - Up: Follow up preop discussion colon @[facility] 12/19/19    HPI:    Marlyn Capps   is a 52year old    male who presents with a one-month history of change in bowel habits and sever 12/11/2019: Patient presents for preoperative discussion with colostomy closure after exploratory laparotomy with Alejandro's procedure for diverticular stricture 8/31/2019. Patient without complaints.     Air-contrast barium enema 11/6/2019:  =====  CONCLUS • aspirin 81 MG Oral Tab Take 81 mg by mouth daily. • FENOFIBRATE OR Take by mouth. • lisinopril 20 MG Oral Tab      • acetaminophen 500 MG Oral Tab Take 1 tablet (500 mg total) by mouth every 6 (six) hours as needed.  30 tablet 0   • vancomycin HCl RESPIRATORY: equal respiratory excursions, clear to auscultation bilaterally,no labored breathing  CARDIOVASCULAR: RRR; good capillary refill, good peripheral perfusion  ABDOMEN:  BS normal, soft, non distended, nontender, no palpable discrete masses prese I discussed the need for colonoscopy and air contrast be preoperatively. The patient wishes to have colostomy closure prior to the end of the year.   I discussed the need for an open closure of colostomy due to the amount of inflammation as well as the dif I reviewed the patient's The operative note as well as intraoperative findings laboratory data as well as the Pathology report in detail with the patient And his wife  I discussed open versus laparoscopic versus robotic assisted resection in detail.   Risks of colostomy. Will plan Open closure of colostomy Possible repair of parastomal hernia with biologic mesh  under general anesthesia at [facility]  on Thursday, December 19, 2019. Guadalupe Riedel    Total time spent in direct patient contact and decision-making  And coordinating

## 2019-12-11 NOTE — TELEPHONE ENCOUNTER
Returned call, asking if Prachi Brush would be willing to start earlier, informed that  has office in the morning.

## 2019-12-19 ENCOUNTER — ANESTHESIA EVENT (OUTPATIENT)
Dept: SURGERY | Facility: HOSPITAL | Age: 49
DRG: 330 | End: 2019-12-19
Payer: COMMERCIAL

## 2019-12-19 ENCOUNTER — APPOINTMENT (OUTPATIENT)
Dept: GENERAL RADIOLOGY | Facility: HOSPITAL | Age: 49
DRG: 330 | End: 2019-12-19
Attending: SURGERY
Payer: COMMERCIAL

## 2019-12-19 ENCOUNTER — HOSPITAL ENCOUNTER (INPATIENT)
Facility: HOSPITAL | Age: 49
LOS: 6 days | Discharge: HOME | DRG: 330 | End: 2019-12-25
Attending: SURGERY | Admitting: HOSPITALIST
Payer: COMMERCIAL

## 2019-12-19 ENCOUNTER — ANESTHESIA (OUTPATIENT)
Dept: SURGERY | Facility: HOSPITAL | Age: 49
DRG: 330 | End: 2019-12-19
Payer: COMMERCIAL

## 2019-12-19 PROBLEM — K57.20 DIVERTICULITIS OF COLON WITH PERFORATION: Status: ACTIVE | Noted: 2019-12-19

## 2019-12-19 PROBLEM — I10 ESSENTIAL HYPERTENSION: Chronic | Status: ACTIVE | Noted: 2019-12-19

## 2019-12-19 PROCEDURE — 0T788DZ DILATION OF BILATERAL URETERS WITH INTRALUMINAL DEVICE, VIA NATURAL OR ARTIFICIAL OPENING ENDOSCOPIC: ICD-10-PCS | Performed by: UROLOGY

## 2019-12-19 PROCEDURE — 74018 RADEX ABDOMEN 1 VIEW: CPT | Performed by: SURGERY

## 2019-12-19 PROCEDURE — 0TJB8ZZ INSPECTION OF BLADDER, VIA NATURAL OR ARTIFICIAL OPENING ENDOSCOPIC: ICD-10-PCS | Performed by: UROLOGY

## 2019-12-19 PROCEDURE — 0DBN0ZZ EXCISION OF SIGMOID COLON, OPEN APPROACH: ICD-10-PCS | Performed by: SURGERY

## 2019-12-19 PROCEDURE — 0DJD8ZZ INSPECTION OF LOWER INTESTINAL TRACT, VIA NATURAL OR ARTIFICIAL OPENING ENDOSCOPIC: ICD-10-PCS | Performed by: SURGERY

## 2019-12-19 PROCEDURE — 99232 SBSQ HOSP IP/OBS MODERATE 35: CPT | Performed by: HOSPITALIST

## 2019-12-19 PROCEDURE — 0DQV0ZZ REPAIR MESENTERY, OPEN APPROACH: ICD-10-PCS | Performed by: SURGERY

## 2019-12-19 PROCEDURE — 52005 CYSTO W/URTRL CATHJ: CPT | Performed by: UROLOGY

## 2019-12-19 PROCEDURE — 0DNN0ZZ RELEASE SIGMOID COLON, OPEN APPROACH: ICD-10-PCS | Performed by: SURGERY

## 2019-12-19 DEVICE — IMPLANTABLE DEVICE: Type: IMPLANTABLE DEVICE | Site: ABDOMEN | Status: FUNCTIONAL

## 2019-12-19 RX ORDER — FAMOTIDINE 10 MG/ML
20 INJECTION, SOLUTION INTRAVENOUS 2 TIMES DAILY
Status: DISCONTINUED | OUTPATIENT
Start: 2019-12-19 | End: 2019-12-25

## 2019-12-19 RX ORDER — GLYCOPYRROLATE 0.2 MG/ML
INJECTION INTRAMUSCULAR; INTRAVENOUS AS NEEDED
Status: DISCONTINUED | OUTPATIENT
Start: 2019-12-19 | End: 2019-12-19 | Stop reason: SURG

## 2019-12-19 RX ORDER — ONDANSETRON 2 MG/ML
INJECTION INTRAMUSCULAR; INTRAVENOUS AS NEEDED
Status: DISCONTINUED | OUTPATIENT
Start: 2019-12-19 | End: 2019-12-19 | Stop reason: SURG

## 2019-12-19 RX ORDER — ALVIMOPAN 12 MG/1
12 CAPSULE ORAL ONCE
Status: COMPLETED | OUTPATIENT
Start: 2019-12-19 | End: 2019-12-19

## 2019-12-19 RX ORDER — EPHEDRINE SULFATE 50 MG/ML
INJECTION, SOLUTION INTRAVENOUS AS NEEDED
Status: DISCONTINUED | OUTPATIENT
Start: 2019-12-19 | End: 2019-12-19 | Stop reason: SURG

## 2019-12-19 RX ORDER — ONDANSETRON 2 MG/ML
4 INJECTION INTRAMUSCULAR; INTRAVENOUS ONCE AS NEEDED
Status: DISCONTINUED | OUTPATIENT
Start: 2019-12-19 | End: 2019-12-19 | Stop reason: HOSPADM

## 2019-12-19 RX ORDER — HYDROMORPHONE HYDROCHLORIDE 1 MG/ML
0.4 INJECTION, SOLUTION INTRAMUSCULAR; INTRAVENOUS; SUBCUTANEOUS EVERY 5 MIN PRN
Status: DISCONTINUED | OUTPATIENT
Start: 2019-12-19 | End: 2019-12-19 | Stop reason: HOSPADM

## 2019-12-19 RX ORDER — NALOXONE HYDROCHLORIDE 0.4 MG/ML
80 INJECTION, SOLUTION INTRAMUSCULAR; INTRAVENOUS; SUBCUTANEOUS AS NEEDED
Status: DISCONTINUED | OUTPATIENT
Start: 2019-12-19 | End: 2019-12-19 | Stop reason: HOSPADM

## 2019-12-19 RX ORDER — SODIUM CHLORIDE, SODIUM LACTATE, POTASSIUM CHLORIDE, CALCIUM CHLORIDE 600; 310; 30; 20 MG/100ML; MG/100ML; MG/100ML; MG/100ML
INJECTION, SOLUTION INTRAVENOUS CONTINUOUS
Status: DISCONTINUED | OUTPATIENT
Start: 2019-12-19 | End: 2019-12-20

## 2019-12-19 RX ORDER — SODIUM CHLORIDE, SODIUM LACTATE, POTASSIUM CHLORIDE, CALCIUM CHLORIDE 600; 310; 30; 20 MG/100ML; MG/100ML; MG/100ML; MG/100ML
INJECTION, SOLUTION INTRAVENOUS CONTINUOUS
Status: DISCONTINUED | OUTPATIENT
Start: 2019-12-19 | End: 2019-12-19 | Stop reason: HOSPADM

## 2019-12-19 RX ORDER — ACETAMINOPHEN 10 MG/ML
1000 INJECTION, SOLUTION INTRAVENOUS EVERY 6 HOURS
Status: DISCONTINUED | OUTPATIENT
Start: 2019-12-19 | End: 2019-12-25

## 2019-12-19 RX ORDER — TRAMADOL HYDROCHLORIDE 50 MG/1
50 TABLET ORAL EVERY 6 HOURS PRN
Qty: 20 TABLET | Refills: 0 | Status: SHIPPED | OUTPATIENT
Start: 2019-12-19 | End: 2020-01-17

## 2019-12-19 RX ORDER — MORPHINE SULFATE 10 MG/ML
6 INJECTION, SOLUTION INTRAMUSCULAR; INTRAVENOUS EVERY 10 MIN PRN
Status: DISCONTINUED | OUTPATIENT
Start: 2019-12-19 | End: 2019-12-19 | Stop reason: HOSPADM

## 2019-12-19 RX ORDER — PROCHLORPERAZINE EDISYLATE 5 MG/ML
5 INJECTION INTRAMUSCULAR; INTRAVENOUS ONCE AS NEEDED
Status: DISCONTINUED | OUTPATIENT
Start: 2019-12-19 | End: 2019-12-19 | Stop reason: HOSPADM

## 2019-12-19 RX ORDER — ACETAMINOPHEN 10 MG/ML
1000 INJECTION, SOLUTION INTRAVENOUS ONCE
Status: DISCONTINUED | OUTPATIENT
Start: 2019-12-19 | End: 2019-12-19 | Stop reason: HOSPADM

## 2019-12-19 RX ORDER — DEXAMETHASONE SODIUM PHOSPHATE 4 MG/ML
VIAL (ML) INJECTION AS NEEDED
Status: DISCONTINUED | OUTPATIENT
Start: 2019-12-19 | End: 2019-12-19 | Stop reason: SURG

## 2019-12-19 RX ORDER — HYDROMORPHONE HYDROCHLORIDE 1 MG/ML
0.2 INJECTION, SOLUTION INTRAMUSCULAR; INTRAVENOUS; SUBCUTANEOUS EVERY 5 MIN PRN
Status: DISCONTINUED | OUTPATIENT
Start: 2019-12-19 | End: 2019-12-19 | Stop reason: HOSPADM

## 2019-12-19 RX ORDER — ROCURONIUM BROMIDE 10 MG/ML
INJECTION, SOLUTION INTRAVENOUS AS NEEDED
Status: DISCONTINUED | OUTPATIENT
Start: 2019-12-19 | End: 2019-12-19 | Stop reason: SURG

## 2019-12-19 RX ORDER — DEXTROSE, SODIUM CHLORIDE, AND POTASSIUM CHLORIDE 5; .45; .15 G/100ML; G/100ML; G/100ML
INJECTION INTRAVENOUS CONTINUOUS
Status: DISCONTINUED | OUTPATIENT
Start: 2019-12-19 | End: 2019-12-25

## 2019-12-19 RX ORDER — SODIUM CHLORIDE 0.9 % (FLUSH) 0.9 %
10 SYRINGE (ML) INJECTION AS NEEDED
Status: DISCONTINUED | OUTPATIENT
Start: 2019-12-19 | End: 2019-12-25

## 2019-12-19 RX ORDER — LIDOCAINE HYDROCHLORIDE 10 MG/ML
INJECTION, SOLUTION EPIDURAL; INFILTRATION; INTRACAUDAL; PERINEURAL AS NEEDED
Status: DISCONTINUED | OUTPATIENT
Start: 2019-12-19 | End: 2019-12-19 | Stop reason: SURG

## 2019-12-19 RX ORDER — ALVIMOPAN 12 MG/1
12 CAPSULE ORAL 2 TIMES DAILY
Status: DISCONTINUED | OUTPATIENT
Start: 2019-12-20 | End: 2019-12-25

## 2019-12-19 RX ORDER — SODIUM CHLORIDE 0.9 % (FLUSH) 0.9 %
10 SYRINGE (ML) INJECTION AS NEEDED
Status: DISCONTINUED | OUTPATIENT
Start: 2019-12-19 | End: 2019-12-19 | Stop reason: HOSPADM

## 2019-12-19 RX ORDER — GABAPENTIN 300 MG/1
300 CAPSULE ORAL NIGHTLY
Status: DISCONTINUED | OUTPATIENT
Start: 2019-12-19 | End: 2019-12-25

## 2019-12-19 RX ORDER — ONDANSETRON 2 MG/ML
8 INJECTION INTRAMUSCULAR; INTRAVENOUS EVERY 6 HOURS PRN
Status: DISCONTINUED | OUTPATIENT
Start: 2019-12-19 | End: 2019-12-25

## 2019-12-19 RX ORDER — GABAPENTIN 300 MG/1
300 CAPSULE ORAL ONCE
Status: COMPLETED | OUTPATIENT
Start: 2019-12-19 | End: 2019-12-19

## 2019-12-19 RX ORDER — HEPARIN SODIUM 5000 [USP'U]/ML
5000 INJECTION, SOLUTION INTRAVENOUS; SUBCUTANEOUS ONCE
Status: COMPLETED | OUTPATIENT
Start: 2019-12-19 | End: 2019-12-19

## 2019-12-19 RX ORDER — MORPHINE SULFATE 4 MG/ML
4 INJECTION, SOLUTION INTRAMUSCULAR; INTRAVENOUS EVERY 10 MIN PRN
Status: DISCONTINUED | OUTPATIENT
Start: 2019-12-19 | End: 2019-12-19 | Stop reason: HOSPADM

## 2019-12-19 RX ORDER — DOCUSATE SODIUM 100 MG/1
100 CAPSULE, LIQUID FILLED ORAL 2 TIMES DAILY
Qty: 14 CAPSULE | Refills: 0 | Status: SHIPPED | OUTPATIENT
Start: 2019-12-19 | End: 2019-12-26

## 2019-12-19 RX ORDER — MORPHINE SULFATE 4 MG/ML
2 INJECTION, SOLUTION INTRAMUSCULAR; INTRAVENOUS EVERY 10 MIN PRN
Status: DISCONTINUED | OUTPATIENT
Start: 2019-12-19 | End: 2019-12-19 | Stop reason: HOSPADM

## 2019-12-19 RX ORDER — HEPARIN SODIUM 5000 [USP'U]/ML
5000 INJECTION, SOLUTION INTRAVENOUS; SUBCUTANEOUS EVERY 12 HOURS SCHEDULED
Status: DISCONTINUED | OUTPATIENT
Start: 2019-12-20 | End: 2019-12-25

## 2019-12-19 RX ORDER — HYDROMORPHONE HYDROCHLORIDE 1 MG/ML
0.6 INJECTION, SOLUTION INTRAMUSCULAR; INTRAVENOUS; SUBCUTANEOUS EVERY 5 MIN PRN
Status: DISCONTINUED | OUTPATIENT
Start: 2019-12-19 | End: 2019-12-19 | Stop reason: HOSPADM

## 2019-12-19 RX ORDER — SODIUM CHLORIDE 9 MG/ML
INJECTION, SOLUTION INTRAVENOUS CONTINUOUS PRN
Status: DISCONTINUED | OUTPATIENT
Start: 2019-12-19 | End: 2019-12-19 | Stop reason: SURG

## 2019-12-19 RX ORDER — MIDAZOLAM HYDROCHLORIDE 1 MG/ML
INJECTION INTRAMUSCULAR; INTRAVENOUS AS NEEDED
Status: DISCONTINUED | OUTPATIENT
Start: 2019-12-19 | End: 2019-12-19 | Stop reason: SURG

## 2019-12-19 RX ADMIN — MIDAZOLAM HYDROCHLORIDE 2 MG: 1 INJECTION INTRAMUSCULAR; INTRAVENOUS at 12:08:00

## 2019-12-19 RX ADMIN — ROCURONIUM BROMIDE 20 MG: 10 INJECTION, SOLUTION INTRAVENOUS at 15:35:00

## 2019-12-19 RX ADMIN — SODIUM CHLORIDE: 9 INJECTION, SOLUTION INTRAVENOUS at 18:47:00

## 2019-12-19 RX ADMIN — ROCURONIUM BROMIDE 20 MG: 10 INJECTION, SOLUTION INTRAVENOUS at 17:25:00

## 2019-12-19 RX ADMIN — SODIUM CHLORIDE, SODIUM LACTATE, POTASSIUM CHLORIDE, CALCIUM CHLORIDE: 600; 310; 30; 20 INJECTION, SOLUTION INTRAVENOUS at 12:08:00

## 2019-12-19 RX ADMIN — ROCURONIUM BROMIDE 20 MG: 10 INJECTION, SOLUTION INTRAVENOUS at 13:22:00

## 2019-12-19 RX ADMIN — EPHEDRINE SULFATE 10 MG: 50 INJECTION, SOLUTION INTRAVENOUS at 13:57:00

## 2019-12-19 RX ADMIN — ROCURONIUM BROMIDE 10 MG: 10 INJECTION, SOLUTION INTRAVENOUS at 14:57:00

## 2019-12-19 RX ADMIN — ROCURONIUM BROMIDE 50 MG: 10 INJECTION, SOLUTION INTRAVENOUS at 12:15:00

## 2019-12-19 RX ADMIN — LIDOCAINE HYDROCHLORIDE 50 MG: 10 INJECTION, SOLUTION EPIDURAL; INFILTRATION; INTRACAUDAL; PERINEURAL at 12:15:00

## 2019-12-19 RX ADMIN — ROCURONIUM BROMIDE 20 MG: 10 INJECTION, SOLUTION INTRAVENOUS at 14:07:00

## 2019-12-19 RX ADMIN — GLYCOPYRROLATE 0.2 MG: 0.2 INJECTION INTRAMUSCULAR; INTRAVENOUS at 12:15:00

## 2019-12-19 RX ADMIN — ROCURONIUM BROMIDE 10 MG: 10 INJECTION, SOLUTION INTRAVENOUS at 18:25:00

## 2019-12-19 RX ADMIN — ONDANSETRON 4 MG: 2 INJECTION INTRAMUSCULAR; INTRAVENOUS at 12:15:00

## 2019-12-19 RX ADMIN — SODIUM CHLORIDE, SODIUM LACTATE, POTASSIUM CHLORIDE, CALCIUM CHLORIDE: 600; 310; 30; 20 INJECTION, SOLUTION INTRAVENOUS at 18:47:00

## 2019-12-19 RX ADMIN — SODIUM CHLORIDE: 9 INJECTION, SOLUTION INTRAVENOUS at 12:27:00

## 2019-12-19 RX ADMIN — ROCURONIUM BROMIDE 20 MG: 10 INJECTION, SOLUTION INTRAVENOUS at 16:23:00

## 2019-12-19 RX ADMIN — DEXAMETHASONE SODIUM PHOSPHATE 4 MG: 4 MG/ML VIAL (ML) INJECTION at 12:15:00

## 2019-12-19 NOTE — INTERVAL H&P NOTE
Pre-op Diagnosis: perforated sigmoid colon    The above referenced H&P was reviewed by Karely Lamar MD on 12/19/2019, the patient was examined and no significant changes have occurred in the patient's condition since the H&P was performed.   I discuss

## 2019-12-19 NOTE — ANESTHESIA PROCEDURE NOTES
Airway  Urgency: Elective      General Information and Staff    Patient location during procedure: OR  Anesthesiologist: Sonja Garcia MD  Performed: anesthesiologist     Indications and Patient Condition  Indications for airway management: anesthesia

## 2019-12-19 NOTE — ANESTHESIA PREPROCEDURE EVALUATION
Anesthesia PreOp Note    HPI:     Cheryl Noguera is a 52year old male who presents for preoperative consultation requested by: Claudetta Gu, MD    Date of Surgery: 12/19/2019    Procedure(s):  COLOSTOMY TAKEDOWN  LIGHTED URETERAL STENT INSERTION PRIOR TO STARTING LAXATIVE., Disp: 2 tablet, Rfl: 0, 12/18/2019 at 0800  aspirin 81 MG Oral Tab, Take 81 mg by mouth daily. , Disp: , Rfl: , 12/11/2019  FENOFIBRATE OR, Take 1 tablet by mouth daily.   , Disp: , Rfl: , 12/18/2019 at 0800  lisinopril 20 MG Ora Lifestyle      Physical activity:        Days per week: Not on file        Minutes per session: Not on file      Stress: Not on file    Relationships      Social connections:        Talks on phone: Not on file        Gets together: Not on file        Atten ROS and normal exam   (+) sleep apnea,   Cardiovascular - negative ROS and normal exam  (+) hypertension,     Neuro/Psych - negative ROS     GI/Hepatic/Renal - negative ROS     Endo/Other - negative ROS   Abdominal  - normal exam  (+) obese,

## 2019-12-19 NOTE — OPERATIVE REPORT
[facility] - [facility]    Operative Note     Krishnakenneth Sallieelida Location: OR   CSN [id number] MRN [id number]   Admission Date 12/19/2019 Operation Date 12/19/2019   Attending Physician Keyshawn Soto MD Operating Physician MD Kelsey Lee catheter backloaded onto a sure seal adapter. The bundle of catheters and diodes was then secured to the patient's right thigh using silk tape. The case was then turned over to Dr. Tabby Gottlieb to complete his portion of the operation.      Loco Mahoney MD

## 2019-12-20 PROCEDURE — 99233 SBSQ HOSP IP/OBS HIGH 50: CPT | Performed by: HOSPITALIST

## 2019-12-20 NOTE — OCCUPATIONAL THERAPY NOTE
OCCUPATIONAL THERAPY EVALUATION - INPATIENT     Room Number: 459/459-A  Evaluation Date: 12/20/2019  Type of Evaluation: Initial       Physician Order: IP Consult to Occupational Therapy  Reason for Therapy: ADL/IADL Dysfunction and Discharge Planning    O from 1-2 more sessions to maximize function. DISCHARGE RECOMMENDATIONS  OT Discharge Recommendations: Home  OT Device Recommendations: None    PLAN  OT Treatment Plan: ADL training; Compensatory technique education       OCCUPATIONAL THERAPY MEDICAL/SOC limits    Communication: WFL      Behavioral/Emotional/Social: cooperative     RANGE OF MOTION   Upper extremity ROM is within functional limits     STRENGTH ASSESSMENT  Upper extremity strength is within functional limits     COORDINATION  Gross Motor: WF

## 2019-12-20 NOTE — CM/SW NOTE
SW received MDO for [facility]. Pt has been screened per chart review and during nursing rounds. Pt is from home w/ spouse, self care. Pt here for colostomy reversal. Pt has PCA pump, michael drain & JES. Per RN, pt is teachable & will be able to manage.  Pt h

## 2019-12-20 NOTE — BRIEF OP NOTE
Pre-Operative Diagnosis: perforated sigmoid colon     Post-Operative Diagnosis: perforated sigmoid colon      Procedure Performed:   Procedure(s):  open closure of colostomy (Martirano),WITH COLON RESECTION, MOBILIZATION OF SPLENIC FLEXURE, REPAIR OF DORY

## 2019-12-20 NOTE — PROGRESS NOTES
[facility] - [facility]  Hospitalist Progress Note     Nayla Figueroa Patient Status:  Inpatient      52year old [facility] [id number]   Location 459/459-A Attending Claire Akhtar MD   Hosp Day # 1 PCP Tamar Abdi MD     ASSESSMENT/PLAN    Hist INR, PTT in the last 168 hours.     • Heparin Sodium (Porcine)  5,000 Units Subcutaneous 2 times per day   • gabapentin  300 mg Oral Nightly   • Alvimopan  12 mg Oral BID   • famoTIDine  20 mg Intravenous BID   • acetaminophen  1,000 mg Intravenous Q6H

## 2019-12-20 NOTE — OPERATIVE REPORT
[facility]    PATIENT'S NAME: [email or web address], [de-identified] R   ATTENDING PHYSICIAN: Juwan Boston MD   OPERATING PHYSICIAN: Mary Sandy MD   PATIENT ACCOUNT#:   [de-identified]    LOCATION:  [address] [place] [address] #:   [id number]       DATE [facility] was administered via endotracheal tube by Anesthesia. The patient was placed in lithotomy position padding all pressure points. Dr. Hiren Wagner inserted lighted ureteral stents, and he will dictate this portion of the operation.   Next, the colostomy was sutur stapling device was placed on the rectal stump and brought anterior to the staple line. The EEA was attached to the anvil and this was closed with no impingement or entrapment of structures. EEA was fired. This was opened and removed.   Two donuts were p circumferentially in several short segments. The mesh was used for reinforcement due to the parastomal hernia and the patient's obesity. At this time, the abdominal cavity was irrigated with saline solution.   The omentum was placed back over the small karolina

## 2019-12-20 NOTE — PROGRESS NOTES
[facility]    Progress Note    Leslie Alaniz Patient Status:  Surgery Admit - Inpt    1970 MRN [id number]   Location [address] [facility] Attending Osvaldo Casanova MD   Hosp Day # 0 PCP Johnna Moran MD COLOSTOMY, TODAY S/P open closure of colostomy (Tyler),WITH COLON RESECTION, MOBILIZATION OF SPLENIC FLEXURE, REPAIR OF PARASTOMAL HERNIA, RIGID PROCTOSCOPY insert ureteral lighted stents (Surinder Jones).   PAIN CONTROL, MONITOR HEMODYNAMIC STATUS, DVT PROPHYL

## 2019-12-20 NOTE — PAYOR COMM NOTE
--------------  CONTINUED STAY REVIEW    Payor: Enoch Truj[facility]  Subscriber #:  [id number]  Authorization Number: [id number]    Admit date: 12/19/19  Admit time: 2103    Admitting Physician: Tess Escobar MD  Attending Physician:  Chana Reynoso MD  Primary administered via endotracheal tube by Anesthesia. The patient was placed in lithotomy position padding all pressure points. Dr. Tacho Nunez inserted lighted ureteral stents, and he will dictate this portion of the operation.   Next, the colostomy was suture cl stapling device was placed on the rectal stump and brought anterior to the staple line. The EEA was attached to the anvil and this was closed with no impingement or entrapment of structures. EEA was fired. This was opened and removed.   Two donuts were p circumferentially in several short segments. The mesh was used for reinforcement due to the parastomal hernia and the patient's obesity. At this time, the abdominal cavity was irrigated with saline solution.   The omentum was placed back over the small karolina GFRAA 93   GFRNAA 80   CA 7.7*      K 4.5      CO2 25.0   Rise Radha DURHAM [facility]  GENERAL SURGERY  12/20/2019  9:33 AM    MEDICATIONS ADMINISTERED IN LAST 1 DAY:  acetaminophen (OFIRMEV) infusion 1,000 mg     Date Action Dose Route User 12/20/2019 0518 New Bag (none) Intravenous Kailee De La Rosa RN    12/19/2019 2141 New Bag (none) Intravenous Kailee De La Rosa RN      morphine 1mg/mL in NS 30 mL PCA syringe     Date Action Dose Route User    12/19/2019 2003 New Bag 30 mg Intravenous Karla

## 2019-12-20 NOTE — PLAN OF CARE
Patient received from PACU s/p surgery. A/O4. Incisions are C/D/I. JPX1 intact. NG to LIS. NPO except ice chips and hard candy. No complaints of n/v. IVF running. Morphine PCA for pain management.  Ambulated in halls with 1 assist and walker, tolerated well need for suctioning and perform as needed  - Assess and instruct to report SOB or any respiratory difficulty  - Respiratory Therapy support as indicated  - Manage/alleviate anxiety  - Monitor for signs/symptoms of CO2 retention  Outcome: Progressing     Pr integrity  - Assess and document dressing/incision, wound bed, drain sites and surrounding tissue  - Implement wound care per orders  - Initiate isolation precautions as appropriate  - Initiate Pressure Ulcer prevention bundle as indicated  Outcome: David appropriate  Outcome: Progressing     Problem: SAFETY ADULT - FALL  Goal: Free from fall injury  Description  INTERVENTIONS:  - Assess pt frequently for physical needs  - Identify cognitive and physical deficits and behaviors that affect risk of falls.   -

## 2019-12-20 NOTE — PLAN OF CARE
Problem: Patient Centered Care  Goal: Patient preferences are identified and integrated in the patient's plan of care  Description  Interventions:  - What would you like us to know as we care for you?   - Provide timely, complete, and accurate informatio ADULT  Goal: Minimal or absence of nausea and vomiting  Description  INTERVENTIONS:  - Maintain adequate hydration with IV or PO as ordered and tolerated  - Nasogastric tube to low intermittent suction as ordered  - Evaluate effectiveness of ordered antiem - ADULT  Goal: Maintains hematologic stability  Description  INTERVENTIONS  - Assess for signs and symptoms of bleeding or hemorrhage  - Monitor labs and vital signs for trends  - Administer supportive blood products/factors, fluids and medications as orde indicated by assessment.  - Educate pt/family on patient safety including physical limitations  - Instruct pt to call for assistance with activity based on assessment  - Modify environment to reduce risk of injury  - Provide assistive devices as appropriat

## 2019-12-20 NOTE — PROGRESS NOTES
[facility]    Progress Note    Ina Mascorro Patient Status:  Inpatient    1970 MRN [id number]   Location [facility] 4W/SW/SE Attending Blanca Swift MD   Hosp Day # 1 PCP Cornelia Hill MD       Subjective:   Dale Laguna    CO2 25.0             Xr Abdomen (1 View) (cpt=74018)    Result Date: 12/19/2019  CONCLUSION:  1. No metallic foreign body visualized. 2. On nonspecific nonobstructive abdominal gas pattern. 3. Surgical JES drain deep within the pelvis.  4. Nasogas

## 2019-12-20 NOTE — ANESTHESIA POSTPROCEDURE EVALUATION
Patient: Adryan Venegas    Procedure Summary     Date:  12/19/19 Room / Location:  [facility] OR 05 / [facility] OR    Anesthesia Start:  3080 Anesthesia Stop:      Procedures:       COLOSTOMY TAKEDOWN (N/A )      LIGHTED URETERAL STENT INSERTION (N/A ) Ninfa

## 2019-12-20 NOTE — PHYSICAL THERAPY NOTE
PHYSICAL THERAPY EVALUATION - INPATIENT     Room Number: 459/459-A  Evaluation Date: 12/20/2019  Type of Evaluation: Initial   Physician Order: PT Eval and Treat    Presenting Problem: perforated sigmoid colon ---pt is s/p abd sx --see sx report below.  pt to remain up in chair---pt did report required sign assist with bed mobility with increase pt difficulty and pain reported.    Therapy did provide education and demonstration on proper log roll sequencing and use of wedge cushion for raised HOB for improved and with activity . Pt able to amb with  ft with SBA for lines tolerating well. Pt with stable vitals see below.  Pt with SBA for stand to sit and scoot back in chair, therapy assisted pt to recline chair per pt request. Pt again declined bed mobili DIAGNOSIS:  History of perforated sigmoid diverticulitis with colostomy and parastomal hernia. POSTOPERATIVE DIAGNOSIS:  History of perforated sigmoid diverticulitis with colostomy and parastomal hernia.   PROCEDURE:  Open closure of colostomy with low ant RESECTION LEFT N/A 8/31/2019    Performed by Raghav Manzanares MD at [address] [facility]   • COLONOSCOPY, POSSIBLE BIOPSY, POSSIBLE POLYPECTOMY 85542 N/A 12/5/2019    Performed by Taina Gann MD at [address] [place] [address] N/A 8/31/2019    Perfo Sitting: Fair +  Dynamic Sitting: Not tested  Static Standing: Fair  Dynamic Standing: Fair -    ADDITIONAL TESTS          incision abd area bandaged   Drain   NG tube '                           NEUROLOGICAL FINDINGS       pt with intact sensation to ligh earlier in day  Reviewed both session proper log roll sequencing with demonstration    Goal #2 Patient is able to demonstrate transfers EOB to/from Chair/Wheelchair at assistance level: supervision with LRAD     Goal #2  Current Status SBA to CGA cues prov

## 2019-12-21 PROCEDURE — 99232 SBSQ HOSP IP/OBS MODERATE 35: CPT | Performed by: HOSPITALIST

## 2019-12-21 PROCEDURE — 5A09357 ASSISTANCE WITH RESPIRATORY VENTILATION, LESS THAN 24 CONSECUTIVE HOURS, CONTINUOUS POSITIVE AIRWAY PRESSURE: ICD-10-PCS | Performed by: HOSPITALIST

## 2019-12-21 NOTE — PROGRESS NOTES
[facility]    Progress Note    Mary Flowers Patient Status:  Inpatient    1970 MRN [id number]   Location [facility] 4W/SW/SE Attending Adrien Salazar MD   Hosp Day # 2 PCP Eileen Watt MD       Subjective:   Anthony Gonzalez Lab 12/20/19  0554 12/21/19  0531   * 115*   BUN 19* 16   CREATSERUM 1.08 1.02   GFRAA 93 99   GFRNAA 80 86   CA 7.7* 8.3*    136   K 4.5 4.0    105   CO2 25.0 29.0             Xr Abdomen (1 View) (cpt=74018)    Result Date: 12/19/2019

## 2019-12-21 NOTE — PLAN OF CARE
NGT to LIS. JES drain intact with output. Everett catheter intact and draining, order to D/C later in AM. IVF infusing. Ofirmev as scheduled. Morphine PCA in place. Patient ambulating in hallways with walker and assist. Up to chair frequently.  Left surgical w and behavior  - Position to facilitate oxygenation and minimize respiratory effort  - Oxygen supplementation based on oxygen saturation or ABGs  - Provide Smoking Cessation handout, if applicable  - Encourage broncho-pulmonary hygiene including cough, deep restrictions as appropriate  Outcome: Progressing     Problem: SKIN/TISSUE INTEGRITY - ADULT  Goal: Incision(s), wounds(s) or drain site(s) healing without S/S of infection  Description  INTERVENTIONS:  - Assess and document risk factors for pressure ulcer influences on pain and pain management  - Manage/alleviate anxiety  - Utilize distraction and/or relaxation techniques  - Monitor for opioid side effects  - Notify MD/LIP if interventions unsuccessful or patient reports new pain  - Anticipate increased amy

## 2019-12-21 NOTE — PLAN OF CARE
Jose David Heller is aware of POC at this time. NG to LIS with green output. Wet to dry dressing changed per protocol. Jerrell drain with minimal output. Everett d/c- patient is a check void. Patient is ambulating with a walker.       Problem: Patient Centered Ca instruct to report SOB or any respiratory difficulty  - Respiratory Therapy support as indicated  - Manage/alleviate anxiety  - Monitor for signs/symptoms of CO2 retention  Outcome: Progressing     Problem: GASTROINTESTINAL - ADULT  Goal: Minimal or absenc bed, drain sites and surrounding tissue  - Implement wound care per orders  - Initiate isolation precautions as appropriate  - Initiate Pressure Ulcer prevention bundle as indicated  Outcome: Progressing     Problem: HEMATOLOGIC - ADULT  Goal: Maintains he FALL  Goal: Free from fall injury  Description  INTERVENTIONS:  - Assess pt frequently for physical needs  - Identify cognitive and physical deficits and behaviors that affect risk of falls.   - [place] fall precautions as indicated by assessment.  - Educ

## 2019-12-21 NOTE — PROGRESS NOTES
[facility]  Hospitalist Progress Note     Barbie Issa Patient Status:  Inpatient      52year old [facility] [id number]   Location 459/459-A Attending Harry Ortiz MD   Hosp Day # 2 PCP Saturnino Amaya MD     ASSESSMENT/PLAN    Hist GFRNAA 80 86   CA 7.7* 8.3*    136   K 4.5 4.0    105   CO2 25.0 29.0     No results for input(s): PT, INR, PTT in the last 168 hours.     • sodium phosphate IVPB  20.001 mmol Intravenous Once   • Heparin Sodium (Porcine)  5,000 Units Subcutan

## 2019-12-22 PROCEDURE — 99232 SBSQ HOSP IP/OBS MODERATE 35: CPT | Performed by: HOSPITALIST

## 2019-12-22 NOTE — PROGRESS NOTES
[place] [facility] - [facility]    Progress Note    Candi Lawler Patient Status:  Inpatient    1970 MRN [id number]   Location [facility] 4W/SW/SE Attending Nathanael Cavanaugh MD   Hosp Day # 3 PCP Will MD Amie       Subjective:   Rachel Espino 12/20/19  0554 12/21/19  0531 12/22/19  0521   * 115* 104*   BUN 19* 16 16   CREATSERUM 1.08 1.02 0.89   GFRAA 93 99 116   GFRNAA 80 86 100   CA 7.7* 8.3* 8.2*    136 137   K 4.5 4.0 4.0    105 103   CO2 25.0 29.0 28.0

## 2019-12-22 NOTE — RESPIRATORY THERAPY NOTE
Pt has a history of LUIS DANIEL and uses CPAP nightly at home. Pt was placed on auto CPAP as per order. Pt has own mask and tubing fitted to hospital equipment. Pt seems to be comfortable as this time with HR 84 SPO2 97%.

## 2019-12-22 NOTE — PLAN OF CARE
Pain controlled with morphine PCA and scheduled ofirmev. Ambulating SBA in hallways many times this shift. Tele monitor in place. IVF infusing. NG to LIS, irrigated and aspirated 30mL Q6. No flatus or BM yet.  Bed in lowest position, call light in reach, fa

## 2019-12-22 NOTE — PROGRESS NOTES
[place] [facility] - [facility]  Hospitalist Progress Note     Barbie Issa Patient Status:  Inpatient      52year old [facility] [id number]   Location 459/459-A Attending Harry Ortiz MD   Hosp Day # 3 PCP Saturnino Amaya MD     ASSESSMENT/PLAN    Hist 12/22/19  0521   * 115* 104*   BUN 19* 16 16   CREATSERUM 1.08 1.02 0.89   GFRAA 93 99 116   GFRNAA 80 86 100   CA 7.7* 8.3* 8.2*    136 137   K 4.5 4.0 4.0    105 103   CO2 25.0 29.0 28.0     No results for input(s): PT, INR, PTT in the

## 2019-12-23 PROCEDURE — 99232 SBSQ HOSP IP/OBS MODERATE 35: CPT | Performed by: HOSPITALIST

## 2019-12-23 NOTE — PLAN OF CARE
NG tube clamped from 5204-8554, aspirated 40mL, reclamped from 6358-3863, aspirated 20mL, removed per order. Ambulating in hallways frequently this shift. No BM or flatus yet. Pain managed with morphine PCA and scheduled tylenol. IVF infusing.  Bed in [place]

## 2019-12-23 NOTE — OCCUPATIONAL THERAPY NOTE
OT initiated treatment with patient ambulating in the hallway independently; followed up with any concerns with returning home; pt had chance to review packet of information left from initial evaluation- reports he has been up 4x already and completing mohini

## 2019-12-23 NOTE — PROGRESS NOTES
[facility]    Progress Note    Donna Vargas Patient Status:  Inpatient    1970 MRN [id number]   Location [facility] 4W/SW/SE Attending Harmony Mendieta MD   Hosp Day # 4 PCP Rodríguez Carlton MD       Subjective:   Gilberto Mtz 12/23/19  0455   * 104* 102*   BUN 16 16 15   CREATSERUM 1.02 0.89 1.00   GFRAA 99 116 102   GFRNAA 86 100 88   CA 8.3* 8.2* 9.0    137 136   K 4.0 4.0 4.6    103 103   CO2 29.0 28.0 30.0                         Time spent in direct harvey

## 2019-12-23 NOTE — PROGRESS NOTES
[facility]  Hospitalist Progress Note     Milo Sanchez Patient Status:  Inpatient      52year old [facility] [id number]   Location 459/459-A Attending Keaton Rosado MD   Hosp Day # 4 PCP Khanh Ko MD     ASSESSMENT/PLAN    Hist 12/22/19  0521 12/23/19  0455   * 104* 102*   BUN 16 16 15   CREATSERUM 1.02 0.89 1.00   GFRAA 99 116 102   GFRNAA 86 100 88   CA 8.3* 8.2* 9.0    137 136   K 4.0 4.0 4.6    103 103   CO2 29.0 28.0 30.0     No results for input(s): PT, I

## 2019-12-23 NOTE — PHYSICAL THERAPY NOTE
PHYSICAL THERAPY TREATMENT NOTE - INPATIENT     Room Number: 459/459-A       Presenting Problem: perforated sigmoid colon ---pt is s/p abd sx --see sx report below. pt with abd sx precautions post sx .       Problem List  Principal Problem:    Perforated si Recommendations: Home; Intermittent Supervision     PLAN  PT Treatment Plan: Bed mobility; Body mechanics; Endurance; Energy conservation;Patient education; Family education;Gait training;Balance training;Transfer training;Stair training;Stoop training    SUBJE rail step through pattern at supervision to MOD IND, good balnace    Patient End of Session: Needs met;RN aware of session/findings; All patient questions and concerns addressed; Other (Comment)(ambulating in hallway)    CURRENT GOALS   Goals to be met by: 2

## 2019-12-23 NOTE — PLAN OF CARE
Pt complaining of some abdominal pain, scheduled tylenol given, PCA pump in use. NG to LIS, Q6 irrigation and aspiration done. R JES drain with 30 mL of output overnight. Abdominal dressing changed at 0500, wet to dry with 4x4s, ABD, and medipore tape.  NPO saturation or ABGs  - Provide Smoking Cessation handout, if applicable  - Encourage broncho-pulmonary hygiene including cough, deep breathe, Incentive Spirometry  - Assess the need for suctioning and perform as needed  - Assess and instruct to report SOB o drain site(s) healing without S/S of infection  Description  INTERVENTIONS:  - Assess and document risk factors for pressure ulcer development  - Assess and document skin integrity  - Assess and document dressing/incision, wound bed, drain sites and surrou Monitor for opioid side effects  - Notify MD/LIP if interventions unsuccessful or patient reports new pain  - Anticipate increased pain with activity and pre-medicate as appropriate  Outcome: Progressing     Problem: SAFETY ADULT - FALL  Goal: Free from fa

## 2019-12-24 VITALS
SYSTOLIC BLOOD PRESSURE: 121 MMHG | DIASTOLIC BLOOD PRESSURE: 89 MMHG | WEIGHT: 250 LBS | HEART RATE: 81 BPM | BODY MASS INDEX: 35.79 KG/M2 | HEIGHT: 70 IN | RESPIRATION RATE: 18 BRPM | TEMPERATURE: 99 F | OXYGEN SATURATION: 97 %

## 2019-12-24 PROCEDURE — 99232 SBSQ HOSP IP/OBS MODERATE 35: CPT | Performed by: HOSPITALIST

## 2019-12-24 RX ORDER — MORPHINE SULFATE 4 MG/ML
4 INJECTION, SOLUTION INTRAMUSCULAR; INTRAVENOUS EVERY 2 HOUR PRN
Status: DISCONTINUED | OUTPATIENT
Start: 2019-12-24 | End: 2019-12-25

## 2019-12-24 RX ORDER — MORPHINE SULFATE 2 MG/ML
2 INJECTION, SOLUTION INTRAMUSCULAR; INTRAVENOUS EVERY 2 HOUR PRN
Status: DISCONTINUED | OUTPATIENT
Start: 2019-12-24 | End: 2019-12-25

## 2019-12-24 NOTE — PLAN OF CARE
Problem: Patient Centered Care  Goal: Patient preferences are identified and integrated in the patient's plan of care  Description  Interventions:  - What would you like us to know as we care for you?  I live with my wife  - Provide timely, complete, and GASTROINTESTINAL - ADULT  Goal: Minimal or absence of nausea and vomiting  Description  INTERVENTIONS:  - Maintain adequate hydration with IV or PO as ordered and tolerated  - Nasogastric tube to low intermittent suction as ordered  - Evaluate effectivenes Problem: HEMATOLOGIC - ADULT  Goal: Maintains hematologic stability  Description  INTERVENTIONS  - Assess for signs and symptoms of bleeding or hemorrhage  - Monitor labs and vital signs for trends  - Administer supportive blood products/factors, fluids precautions as indicated by assessment.  - Educate pt/family on patient safety including physical limitations  - Instruct pt to call for assistance with activity based on assessment  - Modify environment to reduce risk of injury  - Provide assistive device

## 2019-12-24 NOTE — PROGRESS NOTES
[facility]    Progress Note    Zion Mayberry Patient Status:  Inpatient    1970 MRN [id number]   Location [facility] 4W/SW/SE Attending Derick Meyer MD   Hosp Day # 5 PCP Mena Carroll MD       Subjective:   Simon Cancer dressing in place, colostomy site clean without evidence of infection, JES drain serosanguinous   Extremities: extremities normal, atraumatic, no cyanosis or edema      Results:       Recent Labs   Lab 12/22/19  0521 12/23/19  0455 12/24/19  0526   RBC 4.49

## 2019-12-24 NOTE — PAYOR COMM NOTE
CONTINUED STAY REVIEW    Payor: [address] [place] [address]  Subscriber #:  [id number]  Authorization Number: [id number]    Admit date: 12/19/19  Admit time: 2103    Admitting Physician: Rachelle Medellin MD  Attending Physician:  Corina Rodriguez MD  Primary Care Ph Edwar is a(n) 52year old male    POD #2 status post closure of colostomy repair of parastomal hernia with biologic mesh  Patient doing well  Pulmonary toilet  Continue NG tube to low intermittent suction  We will DC Everett catheter today  Primary await b and colon resection, mobilization of splenic flexure, rigid proctoscopy, repair of peristomal hernia with Biologic mesh. Pain well controlled.   NG tube in place.  -Continue NG tube  -dc Everett catheter  -General surgery following  -Pain control  -Increase a flatus     OBJECTIVE  Temp:  [97.5 °F (36.4 °C)-98.4 °F (36.9 °C)] 98.4 °F (36.9 °C)  Pulse:  [68-84] 68  Resp:  [18-19] 19  BP: (128)/(78-87) 128/87  Gen: A+Ox3. No distress. HEENT: NCAT, neck supple, no carotid bruit.   NG tube in place  CV: RRR, S1S2, lb (113.4 kg), SpO2 98 %. I/O last 3 completed shifts: In: 4103 [P.O.:300;  I.V.:3643; NG/GT:60; IV PIGGYBACK:100]  Out: 26 [Urine:2875; Emesis/NG output:1500; Drains:50]      General appearance: alert, appears stated age, cooperative and no distress  Ne grossly normal deficit. MS: No joint effusions. No peripheral edema. Skin: Skin is warm and dry. No rashes, erythema, diaphoresis. Psych:    Normal mood and affect.  Calm, cooperative     Labs:        Recent Labs   Lab 12/21/19  0531 12/22/19  0521 12 cooperative and no distress  Neck: no JVD and supple, symmetrical, trachea midline  Pulmonary: No labored breathing, equal respiratory excursion  Cardiovascular: regular rate and rhythm  Abdominal: soft, positive incisional tenderness, mildly distended bow without evidence of infection, JES drain serosanguinous   Extremities: extremities normal, atraumatic, no cyanosis or edema        Results:               Recent Labs   Lab 12/22/19  0521 12/23/19  0455 12/24/19  0526   RBC 4.49 4.79 4.77   HGB 12.8* 13.7 13

## 2019-12-24 NOTE — PLAN OF CARE
No complaints from patient overnight. Pain managed with morphine PCA and scheduled tylenol. NPO status maintained. No BM noted overnight. Up independently, ambulating frequently through hallways. JUNE dressing CDI.  Surgical dressing changed this AM. JES out including cough, deep breathe, Incentive Spirometry  - Assess the need for suctioning and perform as needed  - Assess and instruct to report SOB or any respiratory difficulty  - Respiratory Therapy support as indicated  - Manage/alleviate anxiety  - [place] factors for pressure ulcer development  - Assess and document skin integrity  - Assess and document dressing/incision, wound bed, drain sites and surrounding tissue  - Implement wound care per orders  - Initiate isolation precautions as appropriate  - Init Anticipate increased pain with activity and pre-medicate as appropriate  Outcome: Progressing     Problem: SAFETY ADULT - FALL  Goal: Free from fall injury  Description  INTERVENTIONS:  - Assess pt frequently for physical needs  - Identify cognitive and ph

## 2019-12-24 NOTE — PROGRESS NOTES
[place] [facility] - [facility]  Hospitalist Progress Note     Marine Poornima Patient Status:  Inpatient      52year old CSN [id number]   Location 459/459-A Attending Sukh Rucker MD   Hosp Day # 5 PCP Yazmin Dooley MD     ASSESSMENT/PLAN    Hist 103*   BUN 16 15 18   CREATSERUM 0.89 1.00 1.12   GFRAA 116 102 89   GFRNAA 100 88 77   CA 8.2* 9.0 9.1    136 135*   K 4.0 4.6 4.7    103 102   CO2 28.0 30.0 30.0     No results for input(s): PT, INR, PTT in the last 168 hours.     • Heparin So

## 2019-12-25 PROCEDURE — 99239 HOSP IP/OBS DSCHRG MGMT >30: CPT | Performed by: HOSPITALIST

## 2019-12-25 NOTE — PROGRESS NOTES
[facility]    Progress Note    Adryancynthia Bryantmargarita Patient Status:  Inpatient    1970 MRN [id number]   Location [facility] 4W/SW/SE Attending Sameer Eugene MD   Hosp Day # 6 PCP Aimee Garcia MD       Subjective:   Loco Burton 1. 12 0.99   GFRAA 102 89 103   GFRNAA 88 77 89   CA 9.0 9.1 8.8    135* 137   K 4.6 4.7 4.4    102 105   CO2 30.0 30.0 29.0

## 2019-12-25 NOTE — PLAN OF CARE
No acute events overnight. Afebrile, VSS. Ambulating hallways TID overnight. Performing IS. Scheduled offirmev covering pain, no additional pain medications needed. Dressing changed per orders. JES drain engaged, output charted. ERA protocol.   Per Buster Corcoran retention  Outcome: Progressing     Problem: GASTROINTESTINAL - ADULT  Goal: Minimal or absence of nausea and vomiting  Description  INTERVENTIONS:  - Maintain adequate hydration with IV or PO as ordered and tolerated  - Nasogastric tube to low intermitten

## 2019-12-25 NOTE — DISCHARGE SUMMARY
[facility] HOSPITALIST  DISCHARGE SUMMARY     Calvin Sweet Patient Status:  Inpatient    1970 MRN [id number]   Location [facility] 4W/SW/SE Attending No att. providers found   [address] Day # 6 PCP Regina Zimmerman MD     DATE OF ADMISSION:  of breath, chest pain, new neurologic symptoms, other concerning symptoms. PHYSICAL EXAM:  Temp:  [98.5 °F (36.9 °C)] 98.5 °F (36.9 °C)  Pulse:  [81] 81  Resp:  [18] 18  BP: (121)/(89) 121/89  Gen: A+Ox3. No distress.    HEENT: NCAT, neck supple, no car Neomycin Sulfate 500 MG Tabs  Commonly known as:  MYCIFRADIN      Take 2 tablets orally at 3:00 pm, 6:00pm, and 9:00pm on the evening prior to surgery   Quantity:  6 tablet  Refills:  0     PEG 3350-KCl-Na Bicarb-NaCl 420 g Solr  Commonly known as:  Julieth Kaur

## 2019-12-26 NOTE — PAYOR COMM NOTE
--------------  DISCHARGE REVIEW    Payor: [id number] [facility]  Subscriber #:  [id number]  Authorization Number: [id number]    Admit date: 12/19/19  Admit time:  2103  Discharge Date: 12/25/2019 12:43 PM     Admitting Physician: Dominick Bowden MD  Attending Physi wound infection, as well as risk with anesthesia, including myocardial infarction, respiratory failure, renal failure, pulmonary embolus, DVT, and even death were all discussed in detail with the patient as well as his wife; both understand, consent, and w tablet  Refills:  0        CONTINUE taking these medications      Instructions Prescription details   aspirin 81 MG Tabs      Take 81 mg by mouth daily.    Refills:  0     erythromycin base 500 MG Tabs  Commonly known as:  E-MYCIN      Take 2 tabs PO at 3:0

## 2020-07-16 NOTE — OPERATIVE REPORT
[facility]    PATIENT'S NAME: Carly Cook   ATTENDING PHYSICIAN: Sanna Kaur DO   OPERATING PHYSICIAN: Kendal Charles MD   PATIENT ACCOUNT#:   [de-identified]    LOCATION:  [address] [place] [address] RECORD #:   [id number]       DATE PATIENT COMPLAINS SINCE INJECTION IN RIGHT KNEE, SHE HAS BEEN UNABLE TO WALK. PLEASE ADVISE.    supine position. General anesthetic was administered via endotracheal tube by Anesthesia. The patient was placed in lithotomy position, padding all pressure points. The abdomen and perineum were prepped and draped in usual sterile fashion.   A midline in ligated with 0 silk ties. The specimen was removed off the field. The specimen was opened, revealing severe high-grade diverticular stricture without evidence of malignancy present.   At this time, the abscess cavity was found within the dome of the bladd insufflated with proctoscope. The rectum was approximately 12 cm of length up to the staple line allowing adequate length for closure of the colostomy in the future.   The rectal stump was marked with 0 Prolene sutures x2 for tags for future identification the right lower quadrant. The omentum was placed over the small bowel. The fascia was then closed in the midline using 0 looped PDS running suture in multiple short segments.   Subcutaneous tissue was reapproximated with 3-0 plain gut simple interrupted s

## 2021-04-16 NOTE — PLAN OF CARE
Problem: Patient Centered Care  Goal: Patient preferences are identified and integrated in the patient's plan of care  Description  Interventions:  - What would you like us to know as we care for you?   - Provide timely, complete, and accurate informatio growth factors as ordered  - Implement neutropenic guidelines  Outcome: Progressing     Problem: SAFETY ADULT - FALL  Goal: Free from fall injury  Description  INTERVENTIONS:  - Assess pt frequently for physical needs  - Identify cognitive and physical def measures as appropriate  - Advance diet as tolerated, if ordered  - Obtain nutritional consult as needed  - Evaluate fluid balance  Outcome: Progressing  Patient alert and oriented X4, vitals stable. [facility]. Not using PCA.  Anna Balderrama MED/SURG

## 2021-04-27 ENCOUNTER — IMMUNIZATION (OUTPATIENT)
Dept: LAB | Facility: HOSPITAL | Age: 51
End: 2021-04-27
Attending: EMERGENCY MEDICINE
Payer: COMMERCIAL

## 2021-04-27 DIAGNOSIS — Z23 NEED FOR VACCINATION: Primary | ICD-10-CM

## 2021-04-27 PROCEDURE — 0011A SARSCOV2 VAC 100MCG/0.5ML IM: CPT

## 2021-05-18 NOTE — OPERATIVE REPORT
[facility]    Operative Note     Caitlin Lopez Location: OR   CSN [id number] MRN [id number]   Admission Date 8/28/2019 Operation Date 8/31/2019   Attending Physician Radha Townsend DO Operating Physician Cleveland Farley MD      Preoper 18-May-2021

## 2021-05-25 ENCOUNTER — IMMUNIZATION (OUTPATIENT)
Dept: LAB | Facility: HOSPITAL | Age: 51
End: 2021-05-25
Attending: EMERGENCY MEDICINE
Payer: COMMERCIAL

## 2021-05-25 DIAGNOSIS — Z23 NEED FOR VACCINATION: Primary | ICD-10-CM

## 2021-05-25 PROCEDURE — 0012A SARSCOV2 VAC 100MCG/0.5ML IM: CPT

## 2021-05-27 ENCOUNTER — LAB REQUISITION (OUTPATIENT)
Dept: LAB | Age: 51
End: 2021-05-27

## 2021-05-27 DIAGNOSIS — R79.89 OTHER SPECIFIED ABNORMAL FINDINGS OF BLOOD CHEMISTRY: ICD-10-CM

## 2021-05-27 DIAGNOSIS — Z12.5 ENCOUNTER FOR SCREENING FOR MALIGNANT NEOPLASM OF PROSTATE: ICD-10-CM

## 2021-05-27 DIAGNOSIS — R25.2 CRAMP AND SPASM: ICD-10-CM

## 2021-05-27 DIAGNOSIS — Z00.00 ENCOUNTER FOR GENERAL ADULT MEDICAL EXAMINATION WITHOUT ABNORMAL FINDINGS: ICD-10-CM

## 2021-05-29 ENCOUNTER — LAB SERVICES (OUTPATIENT)
Dept: LAB | Age: 51
End: 2021-05-29

## 2021-05-29 LAB
ALBUMIN SERPL-MCNC: 4 G/DL (ref 3.6–5.1)
ALBUMIN/GLOB SERPL: 1.3 {RATIO} (ref 1–2.4)
ALP SERPL-CCNC: 72 UNITS/L (ref 45–117)
ALT SERPL-CCNC: 46 UNITS/L
ANION GAP SERPL CALC-SCNC: 9 MMOL/L (ref 10–20)
AST SERPL-CCNC: 27 UNITS/L
BASOPHILS # BLD: 0.1 K/MCL (ref 0–0.3)
BASOPHILS NFR BLD: 1 %
BILIRUB SERPL-MCNC: 0.5 MG/DL (ref 0.2–1)
BUN SERPL-MCNC: 26 MG/DL (ref 6–20)
BUN/CREAT SERPL: 23 (ref 7–25)
CALCIUM SERPL-MCNC: 8.9 MG/DL (ref 8.4–10.2)
CHLORIDE SERPL-SCNC: 107 MMOL/L (ref 98–107)
CHOLEST SERPL-MCNC: 167 MG/DL
CHOLEST/HDLC SERPL: 4.8 {RATIO}
CO2 SERPL-SCNC: 29 MMOL/L (ref 21–32)
CREAT SERPL-MCNC: 1.12 MG/DL (ref 0.67–1.17)
DEPRECATED RDW RBC: 46.4 FL (ref 39–50)
EOSINOPHIL # BLD: 0.5 K/MCL (ref 0–0.5)
EOSINOPHIL NFR BLD: 6 %
ERYTHROCYTE [DISTWIDTH] IN BLOOD: 13.6 % (ref 11–15)
FASTING DURATION TIME PATIENT: 12 HOURS
FASTING DURATION TIME PATIENT: 12 HOURS
GFR SERPLBLD BASED ON 1.73 SQ M-ARVRAT: 76 ML/MIN/1.73M2
GLOBULIN SER-MCNC: 3.1 G/DL (ref 2–4)
GLUCOSE SERPL-MCNC: 86 MG/DL (ref 65–99)
HCT VFR BLD CALC: 39.2 % (ref 39–51)
HDLC SERPL-MCNC: 35 MG/DL
HGB BLD-MCNC: 12.3 G/DL (ref 13–17)
IMM GRANULOCYTES # BLD AUTO: 0.1 K/MCL (ref 0–0.2)
IMM GRANULOCYTES # BLD: 1 %
LDLC SERPL CALC-MCNC: 108 MG/DL
LYMPHOCYTES # BLD: 1.7 K/MCL (ref 1–4.8)
LYMPHOCYTES NFR BLD: 21 %
MAGNESIUM SERPL-MCNC: 2.2 MG/DL (ref 1.7–2.4)
MCH RBC QN AUTO: 29.2 PG (ref 26–34)
MCHC RBC AUTO-ENTMCNC: 31.4 G/DL (ref 32–36.5)
MCV RBC AUTO: 93.1 FL (ref 78–100)
MONOCYTES # BLD: 0.7 K/MCL (ref 0.3–0.9)
MONOCYTES NFR BLD: 9 %
NEUTROPHILS # BLD: 5.1 K/MCL (ref 1.8–7.7)
NEUTROPHILS NFR BLD: 62 %
NONHDLC SERPL-MCNC: 132 MG/DL
NRBC BLD MANUAL-RTO: 0 /100 WBC
PLATELET # BLD AUTO: 302 K/MCL (ref 140–450)
POTASSIUM SERPL-SCNC: 4.8 MMOL/L (ref 3.4–5.1)
PROT SERPL-MCNC: 7.1 G/DL (ref 6.4–8.2)
PSA SERPL-MCNC: 0.17 NG/ML
RBC # BLD: 4.21 MIL/MCL (ref 4.5–5.9)
SODIUM SERPL-SCNC: 140 MMOL/L (ref 135–145)
TESTOST SERPL-MCNC: 74.4 NG/DL (ref 280–1100)
TRIGL SERPL-MCNC: 121 MG/DL
TSH SERPL-ACNC: 3.29 MCUNITS/ML (ref 0.35–5)
WBC # BLD: 8 K/MCL (ref 4.2–11)

## 2021-05-29 PROCEDURE — 85025 COMPLETE CBC W/AUTO DIFF WBC: CPT | Performed by: CLINICAL MEDICAL LABORATORY

## 2021-05-29 PROCEDURE — 84443 ASSAY THYROID STIM HORMONE: CPT | Performed by: CLINICAL MEDICAL LABORATORY

## 2021-05-29 PROCEDURE — 84153 ASSAY OF PSA TOTAL: CPT | Performed by: CLINICAL MEDICAL LABORATORY

## 2021-05-29 PROCEDURE — PSEU8168 THYROID STIMULATING HORMONE REFLEX: Performed by: CLINICAL MEDICAL LABORATORY

## 2021-05-29 PROCEDURE — 83735 ASSAY OF MAGNESIUM: CPT | Performed by: CLINICAL MEDICAL LABORATORY

## 2021-05-29 PROCEDURE — PSEU8274 MAGNESIUM: Performed by: CLINICAL MEDICAL LABORATORY

## 2021-05-29 PROCEDURE — 84403 ASSAY OF TOTAL TESTOSTERONE: CPT | Performed by: CLINICAL MEDICAL LABORATORY

## 2021-05-29 PROCEDURE — PSEU8346 TESTOSTERONE, TOTAL, MALE: Performed by: CLINICAL MEDICAL LABORATORY

## 2021-05-29 PROCEDURE — 80061 LIPID PANEL: CPT | Performed by: CLINICAL MEDICAL LABORATORY

## 2021-05-29 PROCEDURE — 80053 COMPREHEN METABOLIC PANEL: CPT | Performed by: CLINICAL MEDICAL LABORATORY

## 2021-05-29 PROCEDURE — PSEU8250 COMPREHENSIVE METABOLIC PANEL: Performed by: CLINICAL MEDICAL LABORATORY

## 2021-05-29 PROCEDURE — PSEU9050 PSA: Performed by: CLINICAL MEDICAL LABORATORY

## 2021-05-29 PROCEDURE — PSEU8135 LIPID PANEL WITH REFLEX: Performed by: CLINICAL MEDICAL LABORATORY

## 2021-07-29 ENCOUNTER — LAB REQUISITION (OUTPATIENT)
Dept: LAB | Age: 51
End: 2021-07-29

## 2021-07-29 DIAGNOSIS — E78.5 HYPERLIPIDEMIA, UNSPECIFIED: ICD-10-CM

## 2022-06-09 ENCOUNTER — LAB REQUISITION (OUTPATIENT)
Dept: LAB | Age: 52
End: 2022-06-09

## 2022-06-09 DIAGNOSIS — Z12.5 ENCOUNTER FOR SCREENING FOR MALIGNANT NEOPLASM OF PROSTATE: ICD-10-CM

## 2022-06-09 DIAGNOSIS — Z00.00 ENCOUNTER FOR GENERAL ADULT MEDICAL EXAMINATION WITHOUT ABNORMAL FINDINGS: ICD-10-CM

## 2022-06-09 DIAGNOSIS — E66.9 OBESITY, UNSPECIFIED: ICD-10-CM

## 2022-06-11 ENCOUNTER — LAB SERVICES (OUTPATIENT)
Dept: LAB | Age: 52
End: 2022-06-11

## 2022-06-11 LAB
ALBUMIN SERPL-MCNC: 4 G/DL (ref 3.6–5.1)
ALBUMIN/GLOB SERPL: 1.3 {RATIO} (ref 1–2.4)
ALP SERPL-CCNC: 80 UNITS/L (ref 45–117)
ALT SERPL-CCNC: 35 UNITS/L
ANION GAP SERPL CALC-SCNC: 12 MMOL/L (ref 7–19)
AST SERPL-CCNC: 26 UNITS/L
BILIRUB SERPL-MCNC: 0.5 MG/DL (ref 0.2–1)
BUN SERPL-MCNC: 25 MG/DL (ref 6–20)
BUN/CREAT SERPL: 27 (ref 7–25)
CALCIUM SERPL-MCNC: 9.1 MG/DL (ref 8.4–10.2)
CHLORIDE SERPL-SCNC: 105 MMOL/L (ref 97–110)
CHOLEST SERPL-MCNC: 143 MG/DL
CHOLEST/HDLC SERPL: 4.2 {RATIO}
CO2 SERPL-SCNC: 28 MMOL/L (ref 21–32)
CREAT SERPL-MCNC: 0.93 MG/DL (ref 0.67–1.17)
FASTING DURATION TIME PATIENT: 12 HOURS (ref 0–999)
FASTING DURATION TIME PATIENT: 12 HOURS (ref 0–999)
GFR SERPLBLD BASED ON 1.73 SQ M-ARVRAT: >90 ML/MIN
GLOBULIN SER-MCNC: 3.2 G/DL (ref 2–4)
GLUCOSE SERPL-MCNC: 84 MG/DL (ref 70–99)
HDLC SERPL-MCNC: 34 MG/DL
LDLC SERPL CALC-MCNC: 88 MG/DL
NONHDLC SERPL-MCNC: 109 MG/DL
POTASSIUM SERPL-SCNC: 4.9 MMOL/L (ref 3.4–5.1)
PROT SERPL-MCNC: 7.2 G/DL (ref 6.4–8.2)
PSA SERPL-MCNC: 0.25 NG/ML
SODIUM SERPL-SCNC: 140 MMOL/L (ref 135–145)
TRIGL SERPL-MCNC: 107 MG/DL
TSH SERPL-ACNC: 3.69 MCUNITS/ML (ref 0.35–5)

## 2022-06-11 PROCEDURE — 84443 ASSAY THYROID STIM HORMONE: CPT | Performed by: CLINICAL MEDICAL LABORATORY

## 2022-06-11 PROCEDURE — 84153 ASSAY OF PSA TOTAL: CPT | Performed by: CLINICAL MEDICAL LABORATORY

## 2022-06-11 PROCEDURE — 85025 COMPLETE CBC W/AUTO DIFF WBC: CPT | Performed by: CLINICAL MEDICAL LABORATORY

## 2022-06-11 PROCEDURE — 83036 HEMOGLOBIN GLYCOSYLATED A1C: CPT | Performed by: CLINICAL MEDICAL LABORATORY

## 2022-06-11 PROCEDURE — 80053 COMPREHEN METABOLIC PANEL: CPT | Performed by: CLINICAL MEDICAL LABORATORY

## 2022-06-11 PROCEDURE — PSEU8135 LIPID PANEL WITH REFLEX: Performed by: CLINICAL MEDICAL LABORATORY

## 2022-06-11 PROCEDURE — PSEU8266 GLYCOHEMOGLOBIN: Performed by: CLINICAL MEDICAL LABORATORY

## 2022-06-11 PROCEDURE — PSEU8250 COMPREHENSIVE METABOLIC PANEL: Performed by: CLINICAL MEDICAL LABORATORY

## 2022-06-11 PROCEDURE — PSEU8168 THYROID STIMULATING HORMONE REFLEX: Performed by: CLINICAL MEDICAL LABORATORY

## 2022-06-11 PROCEDURE — PSEU9050 PSA: Performed by: CLINICAL MEDICAL LABORATORY

## 2022-06-11 PROCEDURE — 36415 COLL VENOUS BLD VENIPUNCTURE: CPT | Performed by: CLINICAL MEDICAL LABORATORY

## 2022-06-11 PROCEDURE — 80061 LIPID PANEL: CPT | Performed by: CLINICAL MEDICAL LABORATORY

## 2022-06-12 LAB
BASOPHILS # BLD: 0.1 K/MCL (ref 0–0.3)
BASOPHILS NFR BLD: 1 %
DEPRECATED RDW RBC: 51.6 FL (ref 39–50)
EOSINOPHIL # BLD: 0.3 K/MCL (ref 0–0.5)
EOSINOPHIL NFR BLD: 4 %
ERYTHROCYTE [DISTWIDTH] IN BLOOD: 14.4 % (ref 11–15)
HBA1C MFR BLD: 5.5 % (ref 4.5–5.6)
HCT VFR BLD CALC: 39.6 % (ref 39–51)
HGB BLD-MCNC: 12.1 G/DL (ref 13–17)
IMM GRANULOCYTES # BLD AUTO: 0.1 K/MCL (ref 0–0.2)
IMM GRANULOCYTES # BLD: 1 %
LYMPHOCYTES # BLD: 1.9 K/MCL (ref 1–4)
LYMPHOCYTES NFR BLD: 24 %
MCH RBC QN AUTO: 29.3 PG (ref 26–34)
MCHC RBC AUTO-ENTMCNC: 30.6 G/DL (ref 32–36.5)
MCV RBC AUTO: 95.9 FL (ref 78–100)
MONOCYTES # BLD: 0.7 K/MCL (ref 0.3–0.9)
MONOCYTES NFR BLD: 9 %
NEUTROPHILS # BLD: 5 K/MCL (ref 1.8–7.7)
NEUTROPHILS NFR BLD: 61 %
NRBC BLD MANUAL-RTO: 0 /100 WBC
PLATELET # BLD AUTO: 360 K/MCL (ref 140–450)
RBC # BLD: 4.13 MIL/MCL (ref 4.5–5.9)
WBC # BLD: 8 K/MCL (ref 4.2–11)

## 2022-07-09 ENCOUNTER — HOSPITAL ENCOUNTER (OUTPATIENT)
Age: 52
Discharge: HOME | End: 2022-07-09
Attending: EMERGENCY MEDICINE
Payer: COMMERCIAL

## 2022-07-09 DIAGNOSIS — R10.9 ABDOMINAL PAIN OF UNKNOWN ETIOLOGY: Primary | ICD-10-CM

## 2022-07-09 PROCEDURE — 99213 OFFICE O/P EST LOW 20 MIN: CPT

## 2022-07-09 PROCEDURE — 99212 OFFICE O/P EST SF 10 MIN: CPT

## 2022-07-09 NOTE — ED INITIAL ASSESSMENT (HPI)
Generalized mid abdominal pain that was severe 8/10 on Thursday, now resolved. No fever. No n/v/d. Feels bloated. Last BM 1 hour PTA and \"normal\". Decreased appetite. Denies urinary symptoms. Hx of diverticulitis with colon resection.

## 2022-09-08 ENCOUNTER — HOSPITAL ENCOUNTER (EMERGENCY)
Facility: HOSPITAL | Age: 52
Discharge: HOME | End: 2022-09-08
Attending: EMERGENCY MEDICINE
Payer: COMMERCIAL

## 2022-09-08 ENCOUNTER — APPOINTMENT (OUTPATIENT)
Dept: ULTRASOUND IMAGING | Facility: HOSPITAL | Age: 52
End: 2022-09-08
Attending: EMERGENCY MEDICINE
Payer: COMMERCIAL

## 2022-09-08 ENCOUNTER — APPOINTMENT (OUTPATIENT)
Dept: CT IMAGING | Facility: HOSPITAL | Age: 52
End: 2022-09-08
Attending: EMERGENCY MEDICINE
Payer: COMMERCIAL

## 2022-09-08 VITALS
HEIGHT: 70 IN | WEIGHT: 280 LBS | DIASTOLIC BLOOD PRESSURE: 68 MMHG | OXYGEN SATURATION: 95 % | SYSTOLIC BLOOD PRESSURE: 122 MMHG | TEMPERATURE: 97 F | RESPIRATION RATE: 18 BRPM | HEART RATE: 58 BPM | BODY MASS INDEX: 40.09 KG/M2

## 2022-09-08 DIAGNOSIS — R10.9 ABDOMINAL PAIN OF UNKNOWN ETIOLOGY: Primary | ICD-10-CM

## 2022-09-08 LAB
ALBUMIN SERPL-MCNC: 4.1 G/DL (ref 3.4–5)
ALP LIVER SERPL-CCNC: 62 U/L
ALT SERPL-CCNC: 32 U/L
ANION GAP SERPL CALC-SCNC: 5 MMOL/L (ref 0–18)
AST SERPL-CCNC: 19 U/L (ref 15–37)
BASOPHILS # BLD AUTO: 0.05 X10(3) UL (ref 0–0.2)
BASOPHILS NFR BLD AUTO: 0.5 %
BILIRUB DIRECT SERPL-MCNC: 0.1 MG/DL (ref 0–0.2)
BILIRUB SERPL-MCNC: 0.4 MG/DL (ref 0.1–2)
BILIRUB UR QL: NEGATIVE
BUN BLD-MCNC: 25 MG/DL (ref 7–18)
BUN/CREAT SERPL: 24.5 (ref 10–20)
CALCIUM BLD-MCNC: 9.2 MG/DL (ref 8.5–10.1)
CHLORIDE SERPL-SCNC: 109 MMOL/L (ref 98–112)
CLARITY UR: CLEAR
CO2 SERPL-SCNC: 26 MMOL/L (ref 21–32)
COLOR UR: YELLOW
CREAT BLD-MCNC: 1.02 MG/DL
DEPRECATED RDW RBC AUTO: 48.6 FL (ref 35.1–46.3)
EOSINOPHIL # BLD AUTO: 0.12 X10(3) UL (ref 0–0.7)
EOSINOPHIL NFR BLD AUTO: 1.2 %
ERYTHROCYTE [DISTWIDTH] IN BLOOD BY AUTOMATED COUNT: 14.4 % (ref 11–15)
GFR SERPLBLD BASED ON 1.73 SQ M-ARVRAT: 88 ML/MIN/1.73M2 (ref 60–?)
GLUCOSE BLD-MCNC: 94 MG/DL (ref 70–99)
GLUCOSE UR-MCNC: NEGATIVE MG/DL
HCT VFR BLD AUTO: 39.2 %
HGB BLD-MCNC: 12.3 G/DL
HGB UR QL STRIP.AUTO: NEGATIVE
IMM GRANULOCYTES # BLD AUTO: 0.05 X10(3) UL (ref 0–1)
IMM GRANULOCYTES NFR BLD: 0.5 %
KETONES UR-MCNC: NEGATIVE MG/DL
LEUKOCYTE ESTERASE UR QL STRIP.AUTO: NEGATIVE
LIPASE SERPL-CCNC: 121 U/L (ref 73–393)
LYMPHOCYTES # BLD AUTO: 1.57 X10(3) UL (ref 1–4)
LYMPHOCYTES NFR BLD AUTO: 15.2 %
MCH RBC QN AUTO: 28.9 PG (ref 26–34)
MCHC RBC AUTO-ENTMCNC: 31.4 G/DL (ref 31–37)
MCV RBC AUTO: 92 FL
MONOCYTES # BLD AUTO: 0.69 X10(3) UL (ref 0.1–1)
MONOCYTES NFR BLD AUTO: 6.7 %
NEUTROPHILS # BLD AUTO: 7.82 X10 (3) UL (ref 1.5–7.7)
NEUTROPHILS # BLD AUTO: 7.82 X10(3) UL (ref 1.5–7.7)
NEUTROPHILS NFR BLD AUTO: 75.9 %
NITRITE UR QL STRIP.AUTO: NEGATIVE
OSMOLALITY SERPL CALC.SUM OF ELEC: 294 MOSM/KG (ref 275–295)
PH UR: 7 [PH] (ref 5–8)
PLATELET # BLD AUTO: 340 10(3)UL (ref 150–450)
POTASSIUM SERPL-SCNC: 4.6 MMOL/L (ref 3.5–5.1)
PROT SERPL-MCNC: 7 G/DL (ref 6.4–8.2)
PROT UR-MCNC: NEGATIVE MG/DL
RBC # BLD AUTO: 4.26 X10(6)UL
SODIUM SERPL-SCNC: 140 MMOL/L (ref 136–145)
SP GR UR STRIP: 1.02 (ref 1–1.03)
TROPONIN I HIGH SENSITIVITY: 8 NG/L
TROPONIN I HIGH SENSITIVITY: 9 NG/L
UROBILINOGEN UR STRIP-ACNC: 0.2
WBC # BLD AUTO: 10.3 X10(3) UL (ref 4–11)

## 2022-09-08 PROCEDURE — 80048 BASIC METABOLIC PNL TOTAL CA: CPT | Performed by: EMERGENCY MEDICINE

## 2022-09-08 PROCEDURE — 74177 CT ABD & PELVIS W/CONTRAST: CPT | Performed by: EMERGENCY MEDICINE

## 2022-09-08 PROCEDURE — 99285 EMERGENCY DEPT VISIT HI MDM: CPT

## 2022-09-08 PROCEDURE — 93005 ELECTROCARDIOGRAM TRACING: CPT

## 2022-09-08 PROCEDURE — 76705 ECHO EXAM OF ABDOMEN: CPT | Performed by: EMERGENCY MEDICINE

## 2022-09-08 PROCEDURE — 96375 TX/PRO/DX INJ NEW DRUG ADDON: CPT

## 2022-09-08 PROCEDURE — 84484 ASSAY OF TROPONIN QUANT: CPT | Performed by: EMERGENCY MEDICINE

## 2022-09-08 PROCEDURE — 81003 URINALYSIS AUTO W/O SCOPE: CPT | Performed by: EMERGENCY MEDICINE

## 2022-09-08 PROCEDURE — 80076 HEPATIC FUNCTION PANEL: CPT | Performed by: EMERGENCY MEDICINE

## 2022-09-08 PROCEDURE — 85025 COMPLETE CBC W/AUTO DIFF WBC: CPT | Performed by: EMERGENCY MEDICINE

## 2022-09-08 PROCEDURE — 96374 THER/PROPH/DIAG INJ IV PUSH: CPT

## 2022-09-08 PROCEDURE — 93010 ELECTROCARDIOGRAM REPORT: CPT | Performed by: EMERGENCY MEDICINE

## 2022-09-08 PROCEDURE — 83690 ASSAY OF LIPASE: CPT | Performed by: EMERGENCY MEDICINE

## 2022-09-08 PROCEDURE — 96361 HYDRATE IV INFUSION ADD-ON: CPT

## 2022-09-08 RX ORDER — OMEPRAZOLE 20 MG/1
20 CAPSULE, DELAYED RELEASE ORAL DAILY
Qty: 30 CAPSULE | Refills: 0 | Status: SHIPPED | OUTPATIENT
Start: 2022-09-08 | End: 2022-10-08

## 2022-09-08 RX ORDER — MORPHINE SULFATE 2 MG/ML
2 INJECTION, SOLUTION INTRAMUSCULAR; INTRAVENOUS ONCE
Status: COMPLETED | OUTPATIENT
Start: 2022-09-08 | End: 2022-09-08

## 2022-09-08 RX ORDER — ONDANSETRON 2 MG/ML
4 INJECTION INTRAMUSCULAR; INTRAVENOUS ONCE
Status: COMPLETED | OUTPATIENT
Start: 2022-09-08 | End: 2022-09-08

## 2022-09-08 RX ORDER — HYDROCODONE BITARTRATE AND ACETAMINOPHEN 5; 325 MG/1; MG/1
1 TABLET ORAL EVERY 6 HOURS PRN
Qty: 10 TABLET | Refills: 0 | Status: SHIPPED | OUTPATIENT
Start: 2022-09-08

## 2022-09-08 NOTE — ED QUICK NOTES
Patient safe to DC home per MD. [facility], instructions reviewed with patient including when and how to follow up with healthcare providers and when to seek emergency care. The patient verbalizes understanding. Peripheral IV discontinued.

## 2022-09-08 NOTE — ED INITIAL ASSESSMENT (HPI)
C/o abd pain radiating to left flank. Reports pain started this am, mild nausea but no emesis or diarrhea. Took tylenol this am with little relief. Denies pain with urinating, denies hx of UTI or kidney stones.

## 2022-11-04 ENCOUNTER — CLINICAL ABSTRACT (OUTPATIENT)
Dept: CARE COORDINATION | Age: 52
End: 2022-11-04

## 2023-02-06 NOTE — DISCHARGE SUMMARY
[facility]  109/01  HOSPITAL MEDICINE PROGRESS NOTE   Patient: Catherine G Spatz  Today's Date: 2/6/2023    YOB: 1941  Admission Date: 2/5/2023    MRN: [id number]  Inpatient LOS: 0    Attending: Tommy Mejia DO  Hospital Day: Hospital Day: 2    Subjective   HISTORY AND SUBJECTIVE COMPLAINTS     Chief Complaint:   Slurred speech    Interval History / Subjective:   Pt reports doing well this AM. Undergoing MRI.     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  The change in speech has been noticed over the past few days but the patient is unclear when it started exactly.    Patient reports that she placed a hot pack over her back and has caused skin blisters.   Patient reports that she feels slower thinking and more fatigued. Family in the room states still appears  To be \"foggy & slowed\"    ROS:  Pertinent systems negative except as above.    Objective   PHYSICAL EXAMINATION     Vital 24 Hour Range Most Recent Value   Temperature Temp  Min: 95.6 °F (35.3 °C)  Max: 96.8 °F (36 °C) 95.7 °F (35.4 °C)   Pulse Pulse  Min: 46  Max: 89 (!) 52   Respiratory Resp  Min: 12  Max: 25 20   Blood Pressure BP  Min: 85/48  Max: 133/62 94/48   Pulse Oximetry SpO2  Min: 97 %  Max: 100 % 99 %   Arterial BP No data recorded     O2 No data recorded       Recorded Intake and Output:No intake or output data in the 24 hours ending 02/06/23 1513   Recorded Last Stool Occurrence: 1 (02/06/23 0500)     Vital Most Recent Value First Value   Weight 87 kg (191 lb 12.8 oz) Weight: 84.3 kg (185 lb 13.6 oz)   Height 5' 5\" (165.1 cm) Height: 5' 5\" (165.1 cm)   BMI 31.92 N/A     General: Looks chronically ill appearing   CV: regular rate and rhythm  Resp: clear to  Dc summary#[id number]  >30 min spent on [address] Discharge Diagnoses: perf colon    Lace+ Score: 39  59-90 High Risk  29-58 Medium Risk  0-28   Low Risk.     TCM Follow-Up Recommendation:  tcm fu recommended auscultation bilaterally  Abd: soft, nontender and nondistended  Ext:  No eedema to BLE  Skin: blisters on right upper back  Neuro: dysarthria noted. A&Ox4. 0/5 strength in BLE (post-polio, wheelchair bound)..  5/5 strength in BUE.   Psych: normal judgement and insight    TEST RESULTS     Labs: The Laboratory values listed below have been reviewed and pertinent findings discussed in the Assessment and Plan.    Radiology: Imaging studies have been reviewed and pertinent findings discussed in the Assessment and Plan.  Results for orders placed or performed during the hospital encounter of 02/05/23 (from the past 48 hour(s))   XR CHEST AP OR PA - PORTABLE    Impression    IMPRESSION:   Currently, with central pulmonary vascular distention and mild interstitial  pulmonary edema.    Likely trace right pleural effusion   CT HEAD WO CONTRAST    Impression    IMPRESSION:   1. No acute intracranial process           US Renal Complete (Comp Urinary System)    Impression    IMPRESSION:     1. 2.4 cm left renal cyst which may have some complexity. Patient had a  2014 CT abdomen at an outside hospital for which images are not available.  Recommend correlation with prior images if available. An addendum can be  issued once obtained an notified.    2. Otherwise unremarkable kidneys.   US Carotid Duplex Bilateral    Impression    IMPRESSION:   1. Right internal carotid artery:  No significant plaque.  (<50%) using SRU  criteria.     2. Left internal carotid artery:  No significant plaque. (<50%) using SRU  criteria.     3. Anterograde vertebral arteries.    4. Bradyarrhythmia.     MRI Brain WO Contrast    Impression    IMPRESSION:  No evidence of acute ischemia.          ANCILLARY ORDERS     Diet:  Regular; Fluid Restrict 1500ml (900 From Dietary) Diet  Telemetry: On  Consults:    IP CONSULT TO NEUROLOGY  IP CONSULT TO SMOKING CESSATION PROGRAM  IP CONSULT TO SOCIAL WORK  IP CONSULT TO PHYSICAL MEDICINE & REHAB  IP CONSULT TO WOUND  CARE MEDICAL PROVIDER  IP CONSULT TO NUTRITION SERVICES  PHARMACY TO DOSE AND MONITOR WARFARIN  IP CONSULT TO NEPHROLOGY  Therapy Orders:   PT and OT Orders Placed this Encounter   Procedures   • Occupational Therapy   • Physical Therapy       ADVANCED DIRECTIVES     Code Status: Full Resuscitation             ASSESSMENT AND PLAN     #slowed/slurred speech  #Dysarthria   CT head and MRI brain both negative   US carotid duplex bilaterally normal.  Neuro consulted & following  -placed on a stroke protocol  -aspirin and statin  -echo  -check lipids  -bilateral carotid Dopplers within normal limits    #elevated right sided pressures  Echo shows elevated right-sided pressures.    Consulted Cardiology, appreciate recommendations.    2.  Hyponatremia  -na noted to be 129 on admission. Has improved to 131.   -cxr showed some congestion but she does not have any sob  -check urine na and urine osmolality  -fluid restriction for now     3.  pancytopenia  -chronic  -monitor      4.  Blisters right upper back  -will have wound care see patient      5.  Acute on Chronic renal failure, stage 3   -cr noted to be 2..61  -baseline cr around 1.5  -check urine na nad cr  -check renal us  -recheck cr in the am  -hold home lisinopril  -nephro consulted, appreciate recs.       CHRONIC MEDICAL PROBLEMS     6. Atrial fib  -on coumadin  -inr 1.8  -recheck in the am   -on metoprolol  -Pharmacy to dose & monitor     7.  Essential htn  -hold home bp meds to allow for permissive htn     8. hypothyroidism  -on levothyroxine      9.  Chronic diastolic heart failure  -on lasix at home     10.post polio syndrome  -with flacid paraplegia  -wheelchair bound     11.  Hx of breast cancer  -on anastrazole     DVT prophylaxis.  Coumadin int 1.8     FEN. Hyponatremia   Regular diet fluid restriction . Iv fluids. Hep lock    Smoking status: Former Tobacco User    Nutrition status: appropriate  Body mass index is 31.92 kg/m². - Patient is obese with BMI  30-40  DVT Prophylaxis: Coumadin, SCDs         DISCHARGE PLANNING     The patient's treatment plans were discussed with patient.    Discharge Planning     Barriers to discharge: Patient is not medically ready and needs to remain in the hospital today due to stroke  Anticipated discharge destination: Home  Expected Discharge Date: JESE Mejia DO  Hospitalist  2/6/2023  3:13 PM

## 2023-04-14 ENCOUNTER — LAB REQUISITION (OUTPATIENT)
Dept: LAB | Age: 53
End: 2023-04-14

## 2023-04-14 DIAGNOSIS — I10 ESSENTIAL (PRIMARY) HYPERTENSION: ICD-10-CM

## 2023-04-15 ENCOUNTER — LAB SERVICES (OUTPATIENT)
Dept: LAB | Age: 53
End: 2023-04-15

## 2023-04-15 LAB
ALBUMIN SERPL-MCNC: 4 G/DL (ref 3.6–5.1)
ALBUMIN/GLOB SERPL: 1.4 {RATIO} (ref 1–2.4)
ALP SERPL-CCNC: 71 UNITS/L (ref 45–117)
ALT SERPL-CCNC: 35 UNITS/L
ANION GAP SERPL CALC-SCNC: 7 MMOL/L (ref 7–19)
AST SERPL-CCNC: 18 UNITS/L
BILIRUB SERPL-MCNC: 0.3 MG/DL (ref 0.2–1)
BUN SERPL-MCNC: 24 MG/DL (ref 6–20)
BUN/CREAT SERPL: 27 (ref 7–25)
CALCIUM SERPL-MCNC: 9.6 MG/DL (ref 8.4–10.2)
CHLORIDE SERPL-SCNC: 109 MMOL/L (ref 97–110)
CHOLEST SERPL-MCNC: 172 MG/DL
CHOLEST/HDLC SERPL: 4.8 {RATIO}
CO2 SERPL-SCNC: 29 MMOL/L (ref 21–32)
CREAT SERPL-MCNC: 0.89 MG/DL (ref 0.67–1.17)
FASTING DURATION TIME PATIENT: 12 HOURS (ref 0–999)
FASTING DURATION TIME PATIENT: 12 HOURS (ref 0–999)
GFR SERPLBLD BASED ON 1.73 SQ M-ARVRAT: >90 ML/MIN
GLOBULIN SER-MCNC: 2.8 G/DL (ref 2–4)
GLUCOSE SERPL-MCNC: 98 MG/DL (ref 70–99)
HDLC SERPL-MCNC: 36 MG/DL
LDLC SERPL CALC-MCNC: 109 MG/DL
NONHDLC SERPL-MCNC: 136 MG/DL
POTASSIUM SERPL-SCNC: 5.3 MMOL/L (ref 3.4–5.1)
PROT SERPL-MCNC: 6.8 G/DL (ref 6.4–8.2)
SODIUM SERPL-SCNC: 140 MMOL/L (ref 135–145)
TRIGL SERPL-MCNC: 135 MG/DL

## 2023-04-15 PROCEDURE — PSEU8135 LIPID PANEL WITH REFLEX: Performed by: CLINICAL MEDICAL LABORATORY

## 2023-04-15 PROCEDURE — PSEU8250 COMPREHENSIVE METABOLIC PANEL: Performed by: CLINICAL MEDICAL LABORATORY

## 2023-04-15 PROCEDURE — 80053 COMPREHEN METABOLIC PANEL: CPT | Performed by: CLINICAL MEDICAL LABORATORY

## 2023-04-15 PROCEDURE — 80061 LIPID PANEL: CPT | Performed by: CLINICAL MEDICAL LABORATORY

## 2023-07-28 ENCOUNTER — HOSPITAL ENCOUNTER (OUTPATIENT)
Dept: GENERAL RADIOLOGY | Age: 53
Discharge: HOME | End: 2023-07-28
Attending: FAMILY MEDICINE
Payer: COMMERCIAL

## 2023-07-28 DIAGNOSIS — R05.2 SUBACUTE COUGH: ICD-10-CM

## 2023-07-28 PROCEDURE — 71046 X-RAY EXAM CHEST 2 VIEWS: CPT | Performed by: FAMILY MEDICINE

## 2023-08-01 ENCOUNTER — APPOINTMENT (OUTPATIENT)
Dept: GENERAL RADIOLOGY | Facility: HOSPITAL | Age: 53
End: 2023-08-01
Attending: EMERGENCY MEDICINE
Payer: COMMERCIAL

## 2023-08-01 ENCOUNTER — HOSPITAL ENCOUNTER (INPATIENT)
Facility: HOSPITAL | Age: 53
LOS: 2 days | Discharge: HOME | End: 2023-08-03
Attending: EMERGENCY MEDICINE | Admitting: HOSPITALIST
Payer: COMMERCIAL

## 2023-08-01 ENCOUNTER — APPOINTMENT (OUTPATIENT)
Dept: CT IMAGING | Facility: HOSPITAL | Age: 53
End: 2023-08-01
Attending: EMERGENCY MEDICINE
Payer: COMMERCIAL

## 2023-08-01 DIAGNOSIS — K56.609 SBO (SMALL BOWEL OBSTRUCTION) (HCC): Primary | ICD-10-CM

## 2023-08-01 LAB
ALBUMIN SERPL-MCNC: 4.2 G/DL (ref 3.4–5)
ALBUMIN/GLOB SERPL: 1.2 {RATIO} (ref 1–2)
ALP LIVER SERPL-CCNC: 66 U/L
ALT SERPL-CCNC: 35 U/L
ANION GAP SERPL CALC-SCNC: 8 MMOL/L (ref 0–18)
AST SERPL-CCNC: 25 U/L (ref 15–37)
BASOPHILS # BLD AUTO: 0.04 X10(3) UL (ref 0–0.2)
BASOPHILS NFR BLD AUTO: 0.3 %
BILIRUB SERPL-MCNC: 0.6 MG/DL (ref 0.1–2)
BUN BLD-MCNC: 23 MG/DL (ref 7–18)
BUN/CREAT SERPL: 21.9 (ref 10–20)
CALCIUM BLD-MCNC: 9.3 MG/DL (ref 8.5–10.1)
CHLORIDE SERPL-SCNC: 107 MMOL/L (ref 98–112)
CO2 SERPL-SCNC: 25 MMOL/L (ref 21–32)
CREAT BLD-MCNC: 1.05 MG/DL
DEPRECATED RDW RBC AUTO: 45.6 FL (ref 35.1–46.3)
EGFRCR SERPLBLD CKD-EPI 2021: 85 ML/MIN/1.73M2 (ref 60–?)
EOSINOPHIL # BLD AUTO: 0.1 X10(3) UL (ref 0–0.7)
EOSINOPHIL NFR BLD AUTO: 0.8 %
ERYTHROCYTE [DISTWIDTH] IN BLOOD BY AUTOMATED COUNT: 13.5 % (ref 11–15)
GLOBULIN PLAS-MCNC: 3.5 G/DL (ref 2.8–4.4)
GLUCOSE BLD-MCNC: 101 MG/DL (ref 70–99)
HCT VFR BLD AUTO: 40 %
HGB BLD-MCNC: 12.8 G/DL
IMM GRANULOCYTES # BLD AUTO: 0.07 X10(3) UL (ref 0–1)
IMM GRANULOCYTES NFR BLD: 0.6 %
LIPASE SERPL-CCNC: 24 U/L (ref 13–75)
LYMPHOCYTES # BLD AUTO: 1.7 X10(3) UL (ref 1–4)
LYMPHOCYTES NFR BLD AUTO: 13.5 %
MCH RBC QN AUTO: 29.1 PG (ref 26–34)
MCHC RBC AUTO-ENTMCNC: 32 G/DL (ref 31–37)
MCV RBC AUTO: 90.9 FL
MONOCYTES # BLD AUTO: 0.76 X10(3) UL (ref 0.1–1)
MONOCYTES NFR BLD AUTO: 6 %
NEUTROPHILS # BLD AUTO: 9.93 X10 (3) UL (ref 1.5–7.7)
NEUTROPHILS # BLD AUTO: 9.93 X10(3) UL (ref 1.5–7.7)
NEUTROPHILS NFR BLD AUTO: 78.8 %
OSMOLALITY SERPL CALC.SUM OF ELEC: 294 MOSM/KG (ref 275–295)
PLATELET # BLD AUTO: 322 10(3)UL (ref 150–450)
POTASSIUM SERPL-SCNC: 4.2 MMOL/L (ref 3.5–5.1)
PROT SERPL-MCNC: 7.7 G/DL (ref 6.4–8.2)
RBC # BLD AUTO: 4.4 X10(6)UL
SODIUM SERPL-SCNC: 140 MMOL/L (ref 136–145)
WBC # BLD AUTO: 12.6 X10(3) UL (ref 4–11)

## 2023-08-01 PROCEDURE — 85025 COMPLETE CBC W/AUTO DIFF WBC: CPT

## 2023-08-01 PROCEDURE — 74177 CT ABD & PELVIS W/CONTRAST: CPT | Performed by: EMERGENCY MEDICINE

## 2023-08-01 PROCEDURE — 83690 ASSAY OF LIPASE: CPT | Performed by: EMERGENCY MEDICINE

## 2023-08-01 PROCEDURE — 0D9670Z DRAINAGE OF STOMACH WITH DRAINAGE DEVICE, VIA NATURAL OR ARTIFICIAL OPENING: ICD-10-PCS | Performed by: EMERGENCY MEDICINE

## 2023-08-01 PROCEDURE — 80053 COMPREHEN METABOLIC PANEL: CPT | Performed by: EMERGENCY MEDICINE

## 2023-08-01 PROCEDURE — 71045 X-RAY EXAM CHEST 1 VIEW: CPT | Performed by: EMERGENCY MEDICINE

## 2023-08-01 PROCEDURE — 85025 COMPLETE CBC W/AUTO DIFF WBC: CPT | Performed by: EMERGENCY MEDICINE

## 2023-08-01 PROCEDURE — 80053 COMPREHEN METABOLIC PANEL: CPT

## 2023-08-01 RX ORDER — ONDANSETRON 2 MG/ML
4 INJECTION INTRAMUSCULAR; INTRAVENOUS ONCE
Status: COMPLETED | OUTPATIENT
Start: 2023-08-01 | End: 2023-08-01

## 2023-08-01 RX ORDER — SODIUM CHLORIDE 9 MG/ML
INJECTION, SOLUTION INTRAVENOUS CONTINUOUS
Status: ACTIVE | OUTPATIENT
Start: 2023-08-01 | End: 2023-08-02

## 2023-08-01 RX ORDER — FAMOTIDINE 10 MG/ML
20 INJECTION, SOLUTION INTRAVENOUS DAILY
Status: DISCONTINUED | OUTPATIENT
Start: 2023-08-02 | End: 2023-08-02

## 2023-08-01 RX ORDER — MIDAZOLAM HYDROCHLORIDE 1 MG/ML
2.5 INJECTION INTRAMUSCULAR; INTRAVENOUS ONCE
Status: COMPLETED | OUTPATIENT
Start: 2023-08-01 | End: 2023-08-01

## 2023-08-01 RX ORDER — DEXTROSE AND SODIUM CHLORIDE 5; .45 G/100ML; G/100ML
INJECTION, SOLUTION INTRAVENOUS CONTINUOUS
Status: DISCONTINUED | OUTPATIENT
Start: 2023-08-02 | End: 2023-08-03

## 2023-08-01 RX ORDER — KETOROLAC TROMETHAMINE 15 MG/ML
15 INJECTION, SOLUTION INTRAMUSCULAR; INTRAVENOUS ONCE
Status: COMPLETED | OUTPATIENT
Start: 2023-08-01 | End: 2023-08-01

## 2023-08-01 RX ORDER — HYDRALAZINE HYDROCHLORIDE 20 MG/ML
10 INJECTION INTRAMUSCULAR; INTRAVENOUS EVERY 4 HOURS PRN
Status: DISCONTINUED | OUTPATIENT
Start: 2023-08-01 | End: 2023-08-03

## 2023-08-01 RX ORDER — MORPHINE SULFATE 2 MG/ML
2 INJECTION, SOLUTION INTRAMUSCULAR; INTRAVENOUS EVERY 2 HOUR PRN
Status: DISCONTINUED | OUTPATIENT
Start: 2023-08-01 | End: 2023-08-03

## 2023-08-01 RX ORDER — ONDANSETRON 2 MG/ML
4 INJECTION INTRAMUSCULAR; INTRAVENOUS EVERY 6 HOURS PRN
Status: DISCONTINUED | OUTPATIENT
Start: 2023-08-01 | End: 2023-08-03

## 2023-08-01 RX ORDER — MORPHINE SULFATE 4 MG/ML
4 INJECTION, SOLUTION INTRAMUSCULAR; INTRAVENOUS EVERY 2 HOUR PRN
Status: DISCONTINUED | OUTPATIENT
Start: 2023-08-01 | End: 2023-08-03

## 2023-08-01 NOTE — ED INITIAL ASSESSMENT (HPI)
Pt presents to the ER with c/o constipation and n/v x 1 day. C/o general abd pain that is radiating to back    Pt states he had a colostomy reversal 4 years ago.

## 2023-08-02 ENCOUNTER — APPOINTMENT (OUTPATIENT)
Dept: GENERAL RADIOLOGY | Facility: HOSPITAL | Age: 53
End: 2023-08-02
Attending: SURGERY
Payer: COMMERCIAL

## 2023-08-02 ENCOUNTER — APPOINTMENT (OUTPATIENT)
Dept: GENERAL RADIOLOGY | Facility: HOSPITAL | Age: 53
End: 2023-08-02
Attending: HOSPITALIST
Payer: COMMERCIAL

## 2023-08-02 LAB
ANION GAP SERPL CALC-SCNC: 7 MMOL/L (ref 0–18)
BASOPHILS # BLD AUTO: 0.05 X10(3) UL (ref 0–0.2)
BASOPHILS NFR BLD AUTO: 0.5 %
BUN BLD-MCNC: 21 MG/DL (ref 7–18)
BUN/CREAT SERPL: 20.4 (ref 10–20)
CALCIUM BLD-MCNC: 8.9 MG/DL (ref 8.5–10.1)
CHLORIDE SERPL-SCNC: 107 MMOL/L (ref 98–112)
CO2 SERPL-SCNC: 27 MMOL/L (ref 21–32)
CREAT BLD-MCNC: 1.03 MG/DL
DEPRECATED RDW RBC AUTO: 45.9 FL (ref 35.1–46.3)
EGFRCR SERPLBLD CKD-EPI 2021: 87 ML/MIN/1.73M2 (ref 60–?)
EOSINOPHIL # BLD AUTO: 0.19 X10(3) UL (ref 0–0.7)
EOSINOPHIL NFR BLD AUTO: 1.7 %
ERYTHROCYTE [DISTWIDTH] IN BLOOD BY AUTOMATED COUNT: 13.6 % (ref 11–15)
GLUCOSE BLD-MCNC: 109 MG/DL (ref 70–99)
HCT VFR BLD AUTO: 37.5 %
HGB BLD-MCNC: 12 G/DL
IMM GRANULOCYTES # BLD AUTO: 0.05 X10(3) UL (ref 0–1)
IMM GRANULOCYTES NFR BLD: 0.5 %
LYMPHOCYTES # BLD AUTO: 2.11 X10(3) UL (ref 1–4)
LYMPHOCYTES NFR BLD AUTO: 19 %
MCH RBC QN AUTO: 29.4 PG (ref 26–34)
MCHC RBC AUTO-ENTMCNC: 32 G/DL (ref 31–37)
MCV RBC AUTO: 91.9 FL
MONOCYTES # BLD AUTO: 0.8 X10(3) UL (ref 0.1–1)
MONOCYTES NFR BLD AUTO: 7.2 %
NEUTROPHILS # BLD AUTO: 7.89 X10 (3) UL (ref 1.5–7.7)
NEUTROPHILS # BLD AUTO: 7.89 X10(3) UL (ref 1.5–7.7)
NEUTROPHILS NFR BLD AUTO: 71.1 %
OSMOLALITY SERPL CALC.SUM OF ELEC: 296 MOSM/KG (ref 275–295)
PLATELET # BLD AUTO: 292 10(3)UL (ref 150–450)
POTASSIUM SERPL-SCNC: 4.1 MMOL/L (ref 3.5–5.1)
RBC # BLD AUTO: 4.08 X10(6)UL
SODIUM SERPL-SCNC: 141 MMOL/L (ref 136–145)
WBC # BLD AUTO: 11.1 X10(3) UL (ref 4–11)

## 2023-08-02 PROCEDURE — 85025 COMPLETE CBC W/AUTO DIFF WBC: CPT | Performed by: HOSPITALIST

## 2023-08-02 PROCEDURE — 74018 RADEX ABDOMEN 1 VIEW: CPT | Performed by: HOSPITALIST

## 2023-08-02 PROCEDURE — 74250 X-RAY XM SM INT 1CNTRST STD: CPT | Performed by: SURGERY

## 2023-08-02 PROCEDURE — S0028 INJECTION, FAMOTIDINE, 20 MG: HCPCS | Performed by: HOSPITALIST

## 2023-08-02 PROCEDURE — 80048 BASIC METABOLIC PNL TOTAL CA: CPT | Performed by: HOSPITALIST

## 2023-08-02 PROCEDURE — S0028 INJECTION, FAMOTIDINE, 20 MG: HCPCS | Performed by: SURGERY

## 2023-08-02 RX ORDER — HEPARIN SODIUM 5000 [USP'U]/ML
7500 INJECTION, SOLUTION INTRAVENOUS; SUBCUTANEOUS EVERY 8 HOURS SCHEDULED
Status: DISCONTINUED | OUTPATIENT
Start: 2023-08-02 | End: 2023-08-03

## 2023-08-02 RX ORDER — FAMOTIDINE 10 MG/ML
20 INJECTION, SOLUTION INTRAVENOUS 2 TIMES DAILY
Status: DISCONTINUED | OUTPATIENT
Start: 2023-08-02 | End: 2023-08-03

## 2023-08-02 RX ORDER — MORPHINE SULFATE 4 MG/ML
INJECTION, SOLUTION INTRAMUSCULAR; INTRAVENOUS
Status: DISPENSED
Start: 2023-08-02 | End: 2023-08-03

## 2023-08-02 NOTE — PLAN OF CARE
Patient admitted to the floor for SBO. NPO with NGT in place to LIS. Pain managed with morphine PRN. Voiding freely. IVF infusing continuously. Plan pending surgical consult.

## 2023-08-02 NOTE — PLAN OF CARE
Pt is A&Ox4. RA. Heparin for dvt prophylaxis. Voiding freely. Last BM was 7/31. Up self. NPO. D5 45 @100. NG tube, R nare. Plan for repeat follow through test tonight. Problem: Patient Centered Care  Goal: Patient preferences are identified and integrated in the patient's plan of care  Description: Interventions:  - What would you like us to know as we care for you?  My wife was here  - Provide timely, complete, and accurate information to patient/family  - Incorporate patient and family knowledge, values, beliefs, and cultural backgrounds into the planning and delivery of care  - Encourage patient/family to participate in care and decision-making at the level they choose  - Honor patient and family perspectives and choices  Outcome: Progressing     Problem: Patient/Family Goals  Goal: Patient/Family Long Term Goal  Description: Patient's Long Term Goal: to go home without pain    Interventions:  - pain meds  - See additional Care Plan goals for specific interventions  Outcome: Progressing  Goal: Patient/Family Short Term Goal  Description: Patient's Short Term Goal: to go home    Interventions:   - be cleared medically   - See additional Care Plan goals for specific interventions  Outcome: Progressing

## 2023-08-02 NOTE — ED QUICK NOTES
Orders for admission, patient is aware of plan and ready to go upstairs. Any questions, please call ED RN [facility] at extension [phone].      Patient Covid vaccination status: Fully vaccinated     COVID Test Ordered in ED: None    COVID Suspicion at Admission: N/A    Running Infusions:  None    Mental Status/LOC at time of transport: x4    Other pertinent information:   CIWA score: N/A   NIH score:  N/A

## 2023-08-03 ENCOUNTER — APPOINTMENT (OUTPATIENT)
Dept: GENERAL RADIOLOGY | Facility: HOSPITAL | Age: 53
End: 2023-08-03
Attending: SURGERY
Payer: COMMERCIAL

## 2023-08-03 VITALS
RESPIRATION RATE: 18 BRPM | HEIGHT: 70 IN | TEMPERATURE: 99 F | SYSTOLIC BLOOD PRESSURE: 124 MMHG | BODY MASS INDEX: 40.16 KG/M2 | OXYGEN SATURATION: 95 % | DIASTOLIC BLOOD PRESSURE: 82 MMHG | HEART RATE: 66 BPM | WEIGHT: 280.5 LBS

## 2023-08-03 LAB
ANION GAP SERPL CALC-SCNC: 5 MMOL/L (ref 0–18)
BASOPHILS # BLD AUTO: 0.05 X10(3) UL (ref 0–0.2)
BASOPHILS NFR BLD AUTO: 0.5 %
BUN BLD-MCNC: 19 MG/DL (ref 7–18)
BUN/CREAT SERPL: 19.6 (ref 10–20)
CALCIUM BLD-MCNC: 8.5 MG/DL (ref 8.5–10.1)
CHLORIDE SERPL-SCNC: 107 MMOL/L (ref 98–112)
CO2 SERPL-SCNC: 27 MMOL/L (ref 21–32)
CREAT BLD-MCNC: 0.97 MG/DL
DEPRECATED RDW RBC AUTO: 45.8 FL (ref 35.1–46.3)
EGFRCR SERPLBLD CKD-EPI 2021: 93 ML/MIN/1.73M2 (ref 60–?)
EOSINOPHIL # BLD AUTO: 0.24 X10(3) UL (ref 0–0.7)
EOSINOPHIL NFR BLD AUTO: 2.2 %
ERYTHROCYTE [DISTWIDTH] IN BLOOD BY AUTOMATED COUNT: 13.6 % (ref 11–15)
GLUCOSE BLD-MCNC: 103 MG/DL (ref 70–99)
HCT VFR BLD AUTO: 37.1 %
HGB BLD-MCNC: 12.2 G/DL
IMM GRANULOCYTES # BLD AUTO: 0.03 X10(3) UL (ref 0–1)
IMM GRANULOCYTES NFR BLD: 0.3 %
LYMPHOCYTES # BLD AUTO: 1.82 X10(3) UL (ref 1–4)
LYMPHOCYTES NFR BLD AUTO: 16.5 %
MAGNESIUM SERPL-MCNC: 2.2 MG/DL (ref 1.6–2.6)
MCH RBC QN AUTO: 30 PG (ref 26–34)
MCHC RBC AUTO-ENTMCNC: 32.9 G/DL (ref 31–37)
MCV RBC AUTO: 91.2 FL
MONOCYTES # BLD AUTO: 0.92 X10(3) UL (ref 0.1–1)
MONOCYTES NFR BLD AUTO: 8.3 %
NEUTROPHILS # BLD AUTO: 7.97 X10 (3) UL (ref 1.5–7.7)
NEUTROPHILS # BLD AUTO: 7.97 X10(3) UL (ref 1.5–7.7)
NEUTROPHILS NFR BLD AUTO: 72.2 %
OSMOLALITY SERPL CALC.SUM OF ELEC: 291 MOSM/KG (ref 275–295)
PHOSPHATE SERPL-MCNC: 3.1 MG/DL (ref 2.5–4.9)
PLATELET # BLD AUTO: 298 10(3)UL (ref 150–450)
POTASSIUM SERPL-SCNC: 4.1 MMOL/L (ref 3.5–5.1)
RBC # BLD AUTO: 4.07 X10(6)UL
SODIUM SERPL-SCNC: 139 MMOL/L (ref 136–145)
WBC # BLD AUTO: 11 X10(3) UL (ref 4–11)

## 2023-08-03 PROCEDURE — 74019 RADEX ABDOMEN 2 VIEWS: CPT | Performed by: SURGERY

## 2023-08-03 PROCEDURE — 80048 BASIC METABOLIC PNL TOTAL CA: CPT | Performed by: SURGERY

## 2023-08-03 PROCEDURE — S0028 INJECTION, FAMOTIDINE, 20 MG: HCPCS | Performed by: SURGERY

## 2023-08-03 PROCEDURE — 83735 ASSAY OF MAGNESIUM: CPT | Performed by: SURGERY

## 2023-08-03 PROCEDURE — 85025 COMPLETE CBC W/AUTO DIFF WBC: CPT | Performed by: SURGERY

## 2023-08-03 PROCEDURE — 84100 ASSAY OF PHOSPHORUS: CPT | Performed by: SURGERY

## 2023-08-03 NOTE — PLAN OF CARE
A/O x 4. Obstructive series XR completed this morning. NG removed after per order. Pt started on clear liquid diet and advanced to low fiber by the end of the day, tolerated well. Denies pain. Denies nausea. Denies bloating. Passing flatus. Had a soft formed bowel movement. Walking frequently in hallways. Ok to discharge home today. Discharge instructions explained to patient and wife. All medications reviewed including which medications to start, continue, or stop taking. All follow up appointments reviewed. All discharge eduation explained to pt. Patient verbalized understanding, all questions answered. All belongings sent with patient.

## 2023-08-03 NOTE — DIETARY NOTE
Brief Nutrition Note:    Pt admitted r/t SBO (small bowel movement). Diet advanced to low fiber today. RD consulted r/t low fiber diet education x 3 weeks then high fiber diet. Pt visited, family at bedside. Verbally reviewed low fiber diet recommendations. Provided low fiber diet handout. Encouraged pt to follow for 3 weeks per MD recommendations. Verbally reviewed high fiber diet recommendations. Provided handout to follow for reinforcement. Pt without questions at this time. RD to be available prn. F/u per protocol. Please consult nutrition services if earlier intervention is indicated.      Trish Vang MS, [place], [place], [facility]  Clinical Dietitian  P: [phone]

## 2023-08-03 NOTE — PLAN OF CARE
Patient went down for repeat SBFT at start of shift. NGT to LIS throughout night. Patient has started passing gas, Bmx1 as well. NPO. IVF continuous. Morphine PRN for pain. CPAP in place while asleep. Vitals stable. Call light in reach. Plan of care continued as ordered. Problem: Patient Centered Care  Goal: Patient preferences are identified and integrated in the patient's plan of care  Description: Interventions:  - What would you like us to know as we care for you?  From home with wife  - Provide timely, complete, and accurate information to patient/family  - Incorporate patient and family knowledge, values, beliefs, and cultural backgrounds into the planning and delivery of care  - Encourage patient/family to participate in care and decision-making at the level they choose  - Honor patient and family perspectives and choices  Outcome: Progressing     Problem: Patient/Family Goals  Goal: Patient/Family Long Term Goal  Description: Patient's Long Term Goal: Return home    Interventions:  - Pain management  - Tolerating Diet  - Bowels active  - See additional Care Plan goals for specific interventions  Outcome: Progressing  Goal: Patient/Family Short Term Goal  Description: Patient's Short Term Goal: Pain management    Interventions:   - PRN medications  - See additional Care Plan goals for specific interventions  Outcome: Progressing

## 2023-08-04 NOTE — PAYOR COMM NOTE
Discharge Notification    Patient Name: Noel De León  Payor: [employer]  Subscriber #: [id number]  Authorization Number: [id number]  Admit Date/Time: 8/1/2023 6:36 PM  Discharge Date/Time: 8/3/2023 6:55 PM

## 2023-08-07 NOTE — DISCHARGE SUMMARY
[facility]    Discharge Summary    Manuel Bautista Patient Status:  Inpatient    1970 MRN [id number]   Location [facility] 4W/SW/SE Attending No att. providers found   Hosp Day # 2 PCP Maribel Kerr MD     Date of Admission: 2023   Date of Discharge: 8/3/2023  6:55 PM    Admitting Diagnosis: SBO (small bowel obstruction) ([facility] [address] [E90.397]    Disposition: [facility] Discharge Diagnoses: Acute Small bowel obstruction    Lace+ Score: 34  59-90 High Risk  29-58 Medium Risk  0-28   Low Risk. TCM Follow-Up Recommendation:  LACE > 58: High Risk of readmission after discharge from the hospital.        Discharge Diagnosis: . Principal Problem:    SBO (small bowel obstruction) [facility])      Hospital Course:   Reason for Admission:     Per Dr. Aman Galindo is a(n) 48year old male, with past medical history significant for diverticulitis status post partial colectomy with colostomy and eventual reversal presents with complaint of abdominal discomfort nausea and vomiting that started yesterday. Claims approximately year ago had similar symptoms improved with clear liquid diet however this time around unable to tolerate liquids prompting him to come to the ER for further evaluation. Rates his pain as 7 out of 10 in severity aggravated by food and no relieving factors denies any radiation of the pain mainly in the lower abdomen. Claims he has not been passing gas neither has he had a bowel movement in over 24 hours. Emesis is mainly been stomach contents denies any blood or bile       Discharge Physical Exam:   Physical Exam:    General: No acute distress. Respiratory: Clear to auscultation bilaterally. No wheezes. No rhonchi. Cardiovascular: S1, S2. Regular rate and rhythm. No murmurs, rubs or gallops. Abdomen: Soft, nontender, nondistended. Positive bowel sounds. No rebound or guarding. Neurologic: No focal neurological deficits.    Musculoskeletal: Moves all extremities. Hospital Course:   SBO (small bowel obstruction) (HCC)  - obstructive series reviewed  - NG tube out  - oral pain meds   - CLD for breakfast, Full liquids for lunch today   - discharge home today   - heplock IV   - General surgery recs appreciated  - Pepcid 20 mg IV BID     HTN  -stable  - hold oral meds  - IV hydralazine as needed      Morbid obesity  - BMI 40  - diet and exercise recommended      DVT Proph- add heparin     Full code    Complications: none    Consultants         Provider   Role Specialty     Sonia Barone MD  Consulting Physician SURGERY, GENERAL     Karen Tamayo MD  Consulting Physician SURGERY, GENERAL                Discharge Plan:   Discharge Condition: Stable    Discharge Medication List as of 8/3/2023  6:07 PM    Home Meds - Unchanged    aspirin 81 MG Oral Tab  Take 1 tablet (81 mg total) by mouth daily. , Historical    FENOFIBRATE OR  Take 1 tablet by mouth daily. , Historical    lisinopril 20 MG Oral Tab  Take 2 tablets (40 mg total) by mouth daily. , Historical              Discharge Diet: Cardiac     Discharge Activity: As tolerated       Discharge Medications        CONTINUE taking these medications        Instructions Prescription details   aspirin 81 MG Tabs      Take 1 tablet (81 mg total) by mouth daily. Refills: 0     FENOFIBRATE OR      Take 1 tablet by mouth daily. Refills: 0     lisinopril 20 MG Tabs  Commonly known as: Prinivil; Zestril      Take 2 tablets (40 mg total) by mouth daily. Refills: 0            STOP taking these medications      erythromycin base 500 MG Tabs  Commonly known as: E-Mycin        HYDROcodone-acetaminophen 5-325 MG Tabs  Commonly known as: Norco        metoclopramide 10 MG Tabs  Commonly known as: Reglan        neomycin 500 MG Tabs  Commonly known as: Mycifradin        PEG 3350-KCl-Na Bicarb-NaCl 420 g Solr  Commonly known as: Nulytely with Flavor Packs                 Follow up:       Follow-up Information       Kasey Silvestre Lindsay Villa MD Follow up in 1 week(s). Specialty: Family Medicine  Why: Follow up with PCP  Contact information:  [address]  [phone]               Donna Miller MD Follow up in 3 week(s). Specialty: SURGERY, GENERAL  Why: Follow up with surgeon  Contact information:  [address] [place] [address]  [phone]                             Follow up Labs and imaging:          Other Discharge Instructions:         Low residual diet for 3 weeks then high-fiber diet         Time spent:  > 30 minutes    Katie Mathis MD  8/7/2023

## 2023-09-13 ENCOUNTER — LAB SERVICES (OUTPATIENT)
Dept: LAB | Age: 53
End: 2023-09-13

## 2023-09-13 ENCOUNTER — LAB REQUISITION (OUTPATIENT)
Dept: LAB | Age: 53
End: 2023-09-13

## 2023-09-13 DIAGNOSIS — Z84.81 FAMILY HISTORY OF CARRIER OF GENETIC DISEASE: ICD-10-CM

## 2023-09-13 DIAGNOSIS — Z00.00 ENCOUNTER FOR GENERAL ADULT MEDICAL EXAMINATION WITHOUT ABNORMAL FINDINGS: ICD-10-CM

## 2023-09-13 PROCEDURE — 80053 COMPREHEN METABOLIC PANEL: CPT | Performed by: CLINICAL MEDICAL LABORATORY

## 2023-09-13 PROCEDURE — 85025 COMPLETE CBC W/AUTO DIFF WBC: CPT | Performed by: CLINICAL MEDICAL LABORATORY

## 2023-09-13 PROCEDURE — 84443 ASSAY THYROID STIM HORMONE: CPT | Performed by: CLINICAL MEDICAL LABORATORY

## 2023-09-13 PROCEDURE — 80061 LIPID PANEL: CPT | Performed by: CLINICAL MEDICAL LABORATORY

## 2023-09-14 LAB
ALBUMIN SERPL-MCNC: 3.9 G/DL (ref 3.6–5.1)
ALBUMIN/GLOB SERPL: 1.1 {RATIO} (ref 1–2.4)
ALP SERPL-CCNC: 74 UNITS/L (ref 45–117)
ALT SERPL-CCNC: 29 UNITS/L
ANION GAP SERPL CALC-SCNC: 11 MMOL/L (ref 7–19)
AST SERPL-CCNC: 22 UNITS/L
BASOPHILS # BLD: 0 K/MCL (ref 0–0.3)
BASOPHILS NFR BLD: 0 %
BILIRUB SERPL-MCNC: 0.5 MG/DL (ref 0.2–1)
BUN SERPL-MCNC: 23 MG/DL (ref 6–20)
BUN/CREAT SERPL: 23 (ref 7–25)
CALCIUM SERPL-MCNC: 9.1 MG/DL (ref 8.4–10.2)
CHLORIDE SERPL-SCNC: 106 MMOL/L (ref 97–110)
CHOLEST SERPL-MCNC: 151 MG/DL
CHOLEST/HDLC SERPL: 4 {RATIO}
CO2 SERPL-SCNC: 27 MMOL/L (ref 21–32)
CREAT SERPL-MCNC: 0.99 MG/DL (ref 0.67–1.17)
DEPRECATED RDW RBC: 48.7 FL (ref 39–50)
EGFRCR SERPLBLD CKD-EPI 2021: >90 ML/MIN/{1.73_M2}
EOSINOPHIL # BLD: 0.4 K/MCL (ref 0–0.5)
EOSINOPHIL NFR BLD: 5 %
ERYTHROCYTE [DISTWIDTH] IN BLOOD: 13.8 % (ref 11–15)
FASTING DURATION TIME PATIENT: 12 HOURS (ref 0–999)
GLOBULIN SER-MCNC: 3.7 G/DL (ref 2–4)
GLUCOSE SERPL-MCNC: 84 MG/DL (ref 70–99)
HCT VFR BLD CALC: 43 % (ref 39–51)
HDLC SERPL-MCNC: 38 MG/DL
HGB BLD-MCNC: 13.2 G/DL (ref 13–17)
IMM GRANULOCYTES # BLD AUTO: 0 K/MCL (ref 0–0.2)
IMM GRANULOCYTES # BLD: 0 %
LDLC SERPL CALC-MCNC: 85 MG/DL
LYMPHOCYTES # BLD: 1.5 K/MCL (ref 1–4)
LYMPHOCYTES NFR BLD: 17 %
MCH RBC QN AUTO: 29.1 PG (ref 26–34)
MCHC RBC AUTO-ENTMCNC: 30.7 G/DL (ref 32–36.5)
MCV RBC AUTO: 94.7 FL (ref 78–100)
MONOCYTES # BLD: 0.8 K/MCL (ref 0.3–0.9)
MONOCYTES NFR BLD: 8 %
NEUTROPHILS # BLD: 6.2 K/MCL (ref 1.8–7.7)
NEUTROPHILS NFR BLD: 70 %
NONHDLC SERPL-MCNC: 113 MG/DL
NRBC BLD MANUAL-RTO: 0 /100 WBC
PLATELET # BLD AUTO: 312 K/MCL (ref 140–450)
POTASSIUM SERPL-SCNC: 5.2 MMOL/L (ref 3.4–5.1)
PROT SERPL-MCNC: 7.6 G/DL (ref 6.4–8.2)
RBC # BLD: 4.54 MIL/MCL (ref 4.5–5.9)
SODIUM SERPL-SCNC: 139 MMOL/L (ref 135–145)
TRIGL SERPL-MCNC: 140 MG/DL
TSH SERPL-ACNC: 3.07 MCUNITS/ML (ref 0.35–5)
WBC # BLD: 9 K/MCL (ref 4.2–11)

## 2023-11-03 ENCOUNTER — HOSPITAL ENCOUNTER (OUTPATIENT)
Dept: CT IMAGING | Facility: HOSPITAL | Age: 53
Discharge: HOME | End: 2023-11-03
Attending: SURGERY
Payer: COMMERCIAL

## 2023-11-03 ENCOUNTER — LAB ENCOUNTER (OUTPATIENT)
Dept: LAB | Facility: HOSPITAL | Age: 53
End: 2023-11-03
Attending: SURGERY
Payer: COMMERCIAL

## 2023-11-03 DIAGNOSIS — K43.6 VENTRAL HERNIA WITH OBSTRUCTION AND WITHOUT GANGRENE: ICD-10-CM

## 2023-11-03 DIAGNOSIS — K43.6 VENTRAL HERNIA WITH OBSTRUCTION BUT NO GANGRENE: Primary | ICD-10-CM

## 2023-11-03 LAB
ALBUMIN SERPL-MCNC: 4.5 G/DL (ref 3.2–4.8)
ALBUMIN/GLOB SERPL: 1.8 {RATIO} (ref 1–2)
ALP LIVER SERPL-CCNC: 68 U/L
ALT SERPL-CCNC: 21 U/L
ANION GAP SERPL CALC-SCNC: 9 MMOL/L (ref 0–18)
AST SERPL-CCNC: 27 U/L (ref ?–34)
BASOPHILS # BLD AUTO: 0.06 X10(3) UL (ref 0–0.2)
BASOPHILS NFR BLD AUTO: 0.8 %
BILIRUB SERPL-MCNC: 0.5 MG/DL (ref 0.3–1.2)
BUN BLD-MCNC: 20 MG/DL (ref 9–23)
BUN/CREAT SERPL: 20 (ref 10–20)
CALCIUM BLD-MCNC: 9.3 MG/DL (ref 8.7–10.4)
CHLORIDE SERPL-SCNC: 102 MMOL/L (ref 98–112)
CO2 SERPL-SCNC: 27 MMOL/L (ref 21–32)
CREAT BLD-MCNC: 1 MG/DL
CREAT BLD-MCNC: 1 MG/DL
DEPRECATED RDW RBC AUTO: 46 FL (ref 35.1–46.3)
EGFRCR SERPLBLD CKD-EPI 2021: 90 ML/MIN/1.73M2 (ref 60–?)
EGFRCR SERPLBLD CKD-EPI 2021: 90 ML/MIN/1.73M2 (ref 60–?)
EOSINOPHIL # BLD AUTO: 0.3 X10(3) UL (ref 0–0.7)
EOSINOPHIL NFR BLD AUTO: 3.9 %
ERYTHROCYTE [DISTWIDTH] IN BLOOD BY AUTOMATED COUNT: 13.3 % (ref 11–15)
FASTING STATUS PATIENT QL REPORTED: YES
GLOBULIN PLAS-MCNC: 2.5 G/DL (ref 2.8–4.4)
GLUCOSE BLD-MCNC: 89 MG/DL (ref 70–99)
HCT VFR BLD AUTO: 39 %
HGB BLD-MCNC: 12.2 G/DL
IMM GRANULOCYTES # BLD AUTO: 0.06 X10(3) UL (ref 0–1)
IMM GRANULOCYTES NFR BLD: 0.8 %
LYMPHOCYTES # BLD AUTO: 1.77 X10(3) UL (ref 1–4)
LYMPHOCYTES NFR BLD AUTO: 22.9 %
MCH RBC QN AUTO: 29.3 PG (ref 26–34)
MCHC RBC AUTO-ENTMCNC: 31.3 G/DL (ref 31–37)
MCV RBC AUTO: 93.8 FL
MONOCYTES # BLD AUTO: 0.74 X10(3) UL (ref 0.1–1)
MONOCYTES NFR BLD AUTO: 9.6 %
NEUTROPHILS # BLD AUTO: 4.79 X10 (3) UL (ref 1.5–7.7)
NEUTROPHILS # BLD AUTO: 4.79 X10(3) UL (ref 1.5–7.7)
NEUTROPHILS NFR BLD AUTO: 62 %
OSMOLALITY SERPL CALC.SUM OF ELEC: 288 MOSM/KG (ref 275–295)
PLATELET # BLD AUTO: 346 10(3)UL (ref 150–450)
POTASSIUM SERPL-SCNC: 4.9 MMOL/L (ref 3.5–5.1)
PROT SERPL-MCNC: 7 G/DL (ref 5.7–8.2)
RBC # BLD AUTO: 4.16 X10(6)UL
SODIUM SERPL-SCNC: 138 MMOL/L (ref 136–145)
WBC # BLD AUTO: 7.7 X10(3) UL (ref 4–11)

## 2023-11-03 PROCEDURE — 80053 COMPREHEN METABOLIC PANEL: CPT

## 2023-11-03 PROCEDURE — 85025 COMPLETE CBC W/AUTO DIFF WBC: CPT

## 2023-11-03 PROCEDURE — 82565 ASSAY OF CREATININE: CPT

## 2023-11-03 PROCEDURE — 36415 COLL VENOUS BLD VENIPUNCTURE: CPT

## 2023-11-03 PROCEDURE — 74177 CT ABD & PELVIS W/CONTRAST: CPT | Performed by: SURGERY

## 2023-11-03 RX ORDER — IOHEXOL 350 MG/ML
85 INJECTION, SOLUTION INTRAVENOUS
Status: COMPLETED | OUTPATIENT
Start: 2023-11-03 | End: 2023-11-03

## 2023-11-03 RX ADMIN — IOHEXOL 85 ML: 350 INJECTION, SOLUTION INTRAVENOUS at 08:30:00

## 2023-11-15 RX ORDER — CEFAZOLIN SODIUM/WATER 2 G/20 ML
2 SYRINGE (ML) INTRAVENOUS ONCE
Status: CANCELLED | OUTPATIENT
Start: 2023-11-16 | End: 2023-11-15

## 2023-11-16 ENCOUNTER — ANESTHESIA EVENT (OUTPATIENT)
Dept: SURGERY | Facility: HOSPITAL | Age: 53
End: 2023-11-16
Payer: COMMERCIAL

## 2023-11-16 ENCOUNTER — HOSPITAL ENCOUNTER (INPATIENT)
Facility: HOSPITAL | Age: 53
LOS: 8 days | Discharge: HOME | DRG: 336 | End: 2023-11-24
Attending: SURGERY | Admitting: HOSPITALIST
Payer: COMMERCIAL

## 2023-11-16 ENCOUNTER — ANESTHESIA (OUTPATIENT)
Dept: SURGERY | Facility: HOSPITAL | Age: 53
End: 2023-11-16
Payer: COMMERCIAL

## 2023-11-16 ENCOUNTER — HOSPITAL ENCOUNTER (INPATIENT)
Facility: HOSPITAL | Age: 53
LOS: 8 days | Discharge: HOME | End: 2023-11-24
Attending: SURGERY | Admitting: HOSPITALIST
Payer: COMMERCIAL

## 2023-11-16 PROBLEM — Z01.818 PREOP TESTING: Status: ACTIVE | Noted: 2023-11-16

## 2023-11-16 PROBLEM — K43.0 RECURRENT VENTRAL INCISIONAL HERNIA WITH OBSTRUCTION: Status: ACTIVE | Noted: 2023-11-16

## 2023-11-16 PROCEDURE — 99222 1ST HOSP IP/OBS MODERATE 55: CPT | Performed by: HOSPITALIST

## 2023-11-16 PROCEDURE — 0DNW0ZZ RELEASE PERITONEUM, OPEN APPROACH: ICD-10-PCS | Performed by: SURGERY

## 2023-11-16 PROCEDURE — 0WUF0JZ SUPPLEMENT ABDOMINAL WALL WITH SYNTHETIC SUBSTITUTE, OPEN APPROACH: ICD-10-PCS | Performed by: SURGERY

## 2023-11-16 DEVICE — GORE SYNECOR INTRAPERITONEAL 15CMX20CM OVAL BIOMATERIAL
Type: IMPLANTABLE DEVICE | Site: ABDOMEN | Status: FUNCTIONAL
Brand: GORE SYNECOR BIOMATERIAL

## 2023-11-16 RX ORDER — MORPHINE SULFATE 10 MG/ML
6 INJECTION, SOLUTION INTRAMUSCULAR; INTRAVENOUS EVERY 10 MIN PRN
Status: DISCONTINUED | OUTPATIENT
Start: 2023-11-16 | End: 2023-11-16 | Stop reason: HOSPADM

## 2023-11-16 RX ORDER — MORPHINE SULFATE 4 MG/ML
2 INJECTION, SOLUTION INTRAMUSCULAR; INTRAVENOUS EVERY 10 MIN PRN
Status: DISCONTINUED | OUTPATIENT
Start: 2023-11-16 | End: 2023-11-16 | Stop reason: HOSPADM

## 2023-11-16 RX ORDER — NEOSTIGMINE METHYLSULFATE 1 MG/ML
INJECTION, SOLUTION INTRAVENOUS AS NEEDED
Status: DISCONTINUED | OUTPATIENT
Start: 2023-11-16 | End: 2023-11-16 | Stop reason: SURG

## 2023-11-16 RX ORDER — NALOXONE HYDROCHLORIDE 0.4 MG/ML
0.08 INJECTION, SOLUTION INTRAMUSCULAR; INTRAVENOUS; SUBCUTANEOUS
Status: DISCONTINUED | OUTPATIENT
Start: 2023-11-16 | End: 2023-11-18

## 2023-11-16 RX ORDER — DOCUSATE SODIUM 100 MG/1
100 CAPSULE, LIQUID FILLED ORAL 2 TIMES DAILY
Qty: 14 CAPSULE | Refills: 0 | Status: SHIPPED | OUTPATIENT
Start: 2023-11-16 | End: 2023-11-25

## 2023-11-16 RX ORDER — METOCLOPRAMIDE 10 MG/1
10 TABLET ORAL ONCE
Status: COMPLETED | OUTPATIENT
Start: 2023-11-16 | End: 2023-11-16

## 2023-11-16 RX ORDER — MEPERIDINE HYDROCHLORIDE 25 MG/ML
12.5 INJECTION INTRAMUSCULAR; INTRAVENOUS; SUBCUTANEOUS AS NEEDED
Status: DISCONTINUED | OUTPATIENT
Start: 2023-11-16 | End: 2023-11-16 | Stop reason: HOSPADM

## 2023-11-16 RX ORDER — SODIUM CHLORIDE 9 MG/ML
INJECTION, SOLUTION INTRAVENOUS CONTINUOUS
Status: DISCONTINUED | OUTPATIENT
Start: 2023-11-16 | End: 2023-11-18

## 2023-11-16 RX ORDER — FAMOTIDINE 20 MG/1
20 TABLET, FILM COATED ORAL ONCE
Status: COMPLETED | OUTPATIENT
Start: 2023-11-16 | End: 2023-11-16

## 2023-11-16 RX ORDER — DEXAMETHASONE SODIUM PHOSPHATE 4 MG/ML
VIAL (ML) INJECTION AS NEEDED
Status: DISCONTINUED | OUTPATIENT
Start: 2023-11-16 | End: 2023-11-16 | Stop reason: SURG

## 2023-11-16 RX ORDER — FAMOTIDINE 10 MG/ML
20 INJECTION, SOLUTION INTRAVENOUS 2 TIMES DAILY
Status: DISCONTINUED | OUTPATIENT
Start: 2023-11-16 | End: 2023-11-23

## 2023-11-16 RX ORDER — HYDROMORPHONE HYDROCHLORIDE 1 MG/ML
0.6 INJECTION, SOLUTION INTRAMUSCULAR; INTRAVENOUS; SUBCUTANEOUS EVERY 5 MIN PRN
Status: DISCONTINUED | OUTPATIENT
Start: 2023-11-16 | End: 2023-11-16 | Stop reason: HOSPADM

## 2023-11-16 RX ORDER — HYDROMORPHONE HYDROCHLORIDE 1 MG/ML
0.2 INJECTION, SOLUTION INTRAMUSCULAR; INTRAVENOUS; SUBCUTANEOUS EVERY 5 MIN PRN
Status: DISCONTINUED | OUTPATIENT
Start: 2023-11-16 | End: 2023-11-16 | Stop reason: HOSPADM

## 2023-11-16 RX ORDER — DIPHENHYDRAMINE HYDROCHLORIDE 50 MG/ML
12.5 INJECTION INTRAMUSCULAR; INTRAVENOUS EVERY 4 HOURS PRN
Status: DISCONTINUED | OUTPATIENT
Start: 2023-11-16 | End: 2023-11-18

## 2023-11-16 RX ORDER — LISINOPRIL 20 MG/1
40 TABLET ORAL DAILY
Status: DISCONTINUED | OUTPATIENT
Start: 2023-11-17 | End: 2023-11-24

## 2023-11-16 RX ORDER — ONDANSETRON 2 MG/ML
INJECTION INTRAMUSCULAR; INTRAVENOUS AS NEEDED
Status: DISCONTINUED | OUTPATIENT
Start: 2023-11-16 | End: 2023-11-16 | Stop reason: SURG

## 2023-11-16 RX ORDER — SODIUM CHLORIDE, SODIUM LACTATE, POTASSIUM CHLORIDE, CALCIUM CHLORIDE 600; 310; 30; 20 MG/100ML; MG/100ML; MG/100ML; MG/100ML
INJECTION, SOLUTION INTRAVENOUS CONTINUOUS
Status: DISCONTINUED | OUTPATIENT
Start: 2023-11-16 | End: 2023-11-18

## 2023-11-16 RX ORDER — HEPARIN SODIUM 5000 [USP'U]/ML
5000 INJECTION, SOLUTION INTRAVENOUS; SUBCUTANEOUS EVERY 12 HOURS
Status: DISCONTINUED | OUTPATIENT
Start: 2023-11-17 | End: 2023-11-24

## 2023-11-16 RX ORDER — HYDROMORPHONE HYDROCHLORIDE 1 MG/ML
0.4 INJECTION, SOLUTION INTRAMUSCULAR; INTRAVENOUS; SUBCUTANEOUS EVERY 5 MIN PRN
Status: DISCONTINUED | OUTPATIENT
Start: 2023-11-16 | End: 2023-11-16 | Stop reason: HOSPADM

## 2023-11-16 RX ORDER — NALOXONE HYDROCHLORIDE 0.4 MG/ML
80 INJECTION, SOLUTION INTRAMUSCULAR; INTRAVENOUS; SUBCUTANEOUS AS NEEDED
Status: DISCONTINUED | OUTPATIENT
Start: 2023-11-16 | End: 2023-11-16 | Stop reason: HOSPADM

## 2023-11-16 RX ORDER — CEFAZOLIN SODIUM IN 0.9 % NACL 3 G/100 ML
3 INTRAVENOUS SOLUTION, PIGGYBACK (ML) INTRAVENOUS EVERY 8 HOURS
Qty: 200 ML | Refills: 0 | Status: COMPLETED | OUTPATIENT
Start: 2023-11-17 | End: 2023-11-17

## 2023-11-16 RX ORDER — ONDANSETRON 2 MG/ML
8 INJECTION INTRAMUSCULAR; INTRAVENOUS EVERY 6 HOURS PRN
Status: DISCONTINUED | OUTPATIENT
Start: 2023-11-16 | End: 2023-11-24

## 2023-11-16 RX ORDER — MORPHINE SULFATE 4 MG/ML
4 INJECTION, SOLUTION INTRAMUSCULAR; INTRAVENOUS EVERY 10 MIN PRN
Status: DISCONTINUED | OUTPATIENT
Start: 2023-11-16 | End: 2023-11-16 | Stop reason: HOSPADM

## 2023-11-16 RX ORDER — TRAMADOL HYDROCHLORIDE 50 MG/1
50 TABLET ORAL EVERY 6 HOURS PRN
Qty: 15 TABLET | Refills: 0 | Status: SHIPPED | OUTPATIENT
Start: 2023-11-16

## 2023-11-16 RX ORDER — ONDANSETRON 2 MG/ML
4 INJECTION INTRAMUSCULAR; INTRAVENOUS EVERY 6 HOURS PRN
Status: DISCONTINUED | OUTPATIENT
Start: 2023-11-16 | End: 2023-11-16 | Stop reason: HOSPADM

## 2023-11-16 RX ORDER — GLYCOPYRROLATE 0.2 MG/ML
INJECTION, SOLUTION INTRAMUSCULAR; INTRAVENOUS AS NEEDED
Status: DISCONTINUED | OUTPATIENT
Start: 2023-11-16 | End: 2023-11-16 | Stop reason: SURG

## 2023-11-16 RX ORDER — METOCLOPRAMIDE HYDROCHLORIDE 5 MG/ML
10 INJECTION INTRAMUSCULAR; INTRAVENOUS ONCE
Status: COMPLETED | OUTPATIENT
Start: 2023-11-16 | End: 2023-11-16

## 2023-11-16 RX ORDER — ACETAMINOPHEN 500 MG
1000 TABLET ORAL ONCE
Status: COMPLETED | OUTPATIENT
Start: 2023-11-16 | End: 2023-11-16

## 2023-11-16 RX ORDER — LIDOCAINE HYDROCHLORIDE 10 MG/ML
INJECTION, SOLUTION EPIDURAL; INFILTRATION; INTRACAUDAL; PERINEURAL AS NEEDED
Status: DISCONTINUED | OUTPATIENT
Start: 2023-11-16 | End: 2023-11-16 | Stop reason: SURG

## 2023-11-16 RX ORDER — FAMOTIDINE 10 MG/ML
20 INJECTION, SOLUTION INTRAVENOUS ONCE
Status: COMPLETED | OUTPATIENT
Start: 2023-11-16 | End: 2023-11-16

## 2023-11-16 RX ORDER — ACETAMINOPHEN 10 MG/ML
1000 INJECTION, SOLUTION INTRAVENOUS EVERY 6 HOURS
Status: DISCONTINUED | OUTPATIENT
Start: 2023-11-16 | End: 2023-11-19

## 2023-11-16 RX ORDER — DEXTROSE MONOHYDRATE, SODIUM CHLORIDE, AND POTASSIUM CHLORIDE 50; 1.49; 4.5 G/1000ML; G/1000ML; G/1000ML
INJECTION, SOLUTION INTRAVENOUS CONTINUOUS
Status: DISCONTINUED | OUTPATIENT
Start: 2023-11-16 | End: 2023-11-18

## 2023-11-16 RX ORDER — PROCHLORPERAZINE EDISYLATE 5 MG/ML
5 INJECTION INTRAMUSCULAR; INTRAVENOUS EVERY 8 HOURS PRN
Status: DISCONTINUED | OUTPATIENT
Start: 2023-11-16 | End: 2023-11-16 | Stop reason: HOSPADM

## 2023-11-16 RX ORDER — PROCHLORPERAZINE EDISYLATE 5 MG/ML
5 INJECTION INTRAMUSCULAR; INTRAVENOUS EVERY 6 HOURS PRN
Status: DISCONTINUED | OUTPATIENT
Start: 2023-11-16 | End: 2023-11-24

## 2023-11-16 RX ORDER — ONDANSETRON 2 MG/ML
4 INJECTION INTRAMUSCULAR; INTRAVENOUS EVERY 6 HOURS PRN
Status: DISCONTINUED | OUTPATIENT
Start: 2023-11-16 | End: 2023-11-18

## 2023-11-16 RX ORDER — SODIUM CHLORIDE, SODIUM LACTATE, POTASSIUM CHLORIDE, CALCIUM CHLORIDE 600; 310; 30; 20 MG/100ML; MG/100ML; MG/100ML; MG/100ML
INJECTION, SOLUTION INTRAVENOUS CONTINUOUS
Status: DISCONTINUED | OUTPATIENT
Start: 2023-11-16 | End: 2023-11-16 | Stop reason: HOSPADM

## 2023-11-16 RX ORDER — ROCURONIUM BROMIDE 10 MG/ML
INJECTION, SOLUTION INTRAVENOUS AS NEEDED
Status: DISCONTINUED | OUTPATIENT
Start: 2023-11-16 | End: 2023-11-16 | Stop reason: SURG

## 2023-11-16 RX ORDER — CEFAZOLIN SODIUM IN 0.9 % NACL 3 G/100 ML
3 INTRAVENOUS SOLUTION, PIGGYBACK (ML) INTRAVENOUS ONCE
Status: COMPLETED | OUTPATIENT
Start: 2023-11-16 | End: 2023-11-16

## 2023-11-16 RX ADMIN — ROCURONIUM BROMIDE 20 MG: 10 INJECTION, SOLUTION INTRAVENOUS at 17:47:00

## 2023-11-16 RX ADMIN — LIDOCAINE HYDROCHLORIDE 25 MG: 10 INJECTION, SOLUTION EPIDURAL; INFILTRATION; INTRACAUDAL; PERINEURAL at 16:05:00

## 2023-11-16 RX ADMIN — NEOSTIGMINE METHYLSULFATE 4 MG: 1 INJECTION, SOLUTION INTRAVENOUS at 19:02:00

## 2023-11-16 RX ADMIN — DEXAMETHASONE SODIUM PHOSPHATE 4 MG: 4 MG/ML VIAL (ML) INJECTION at 19:00:00

## 2023-11-16 RX ADMIN — ROCURONIUM BROMIDE 50 MG: 10 INJECTION, SOLUTION INTRAVENOUS at 16:15:00

## 2023-11-16 RX ADMIN — ROCURONIUM BROMIDE 30 MG: 10 INJECTION, SOLUTION INTRAVENOUS at 16:50:00

## 2023-11-16 RX ADMIN — ONDANSETRON 4 MG: 2 INJECTION INTRAMUSCULAR; INTRAVENOUS at 19:00:00

## 2023-11-16 RX ADMIN — CEFAZOLIN SODIUM IN 0.9 % NACL 3 G: 3 G/100 ML INTRAVENOUS SOLUTION, PIGGYBACK (ML) INTRAVENOUS at 16:09:00

## 2023-11-16 RX ADMIN — GLYCOPYRROLATE 0.8 MG: 0.2 INJECTION, SOLUTION INTRAMUSCULAR; INTRAVENOUS at 19:02:00

## 2023-11-16 RX ADMIN — SODIUM CHLORIDE, SODIUM LACTATE, POTASSIUM CHLORIDE, CALCIUM CHLORIDE: 600; 310; 30; 20 INJECTION, SOLUTION INTRAVENOUS at 18:20:00

## 2023-11-16 RX ADMIN — SODIUM CHLORIDE, SODIUM LACTATE, POTASSIUM CHLORIDE, CALCIUM CHLORIDE: 600; 310; 30; 20 INJECTION, SOLUTION INTRAVENOUS at 19:11:00

## 2023-11-16 NOTE — ANESTHESIA PROCEDURE NOTES
Airway  Date/Time: 11/16/2023 4:06 PM  Urgency: Elective      General Information and Staff    Patient location during procedure: OR  Anesthesiologist: Karen Elias MD  Performed: anesthesiologist   Performed by: Karen Elias MD  Authorized by: Karen Elias MD      Indications and Patient Condition  Indications for airway management: anesthesia  Sedation level: deep  Preoxygenated: yes  Patient position: sniffing  Mask difficulty assessment: 1 - vent by mask    Final Airway Details  Final airway type: endotracheal airway      Successful airway: ETT  Cuffed: yes   Successful intubation technique: Video laryngoscopy  Facilitating devices/methods: intubating stylet and cricoid pressure  Endotracheal tube insertion site: oral  Blade size: #3  ETT size (mm): 7.0    Cormack-Lehane Classification: grade IIA - partial view of glottis  Placement verified by: capnometry   Measured from: teeth  Number of attempts at approach: 1

## 2023-11-16 NOTE — INTERVAL H&P NOTE
Pre-op Diagnosis: Incarcerated incisional ventral hernia    The above referenced H&P was reviewed by August Her MD on 11/16/2023, the patient was examined and no significant changes have occurred in the patient's condition since the H&P was performed. I discussed with the patient and/or legal representative the potential benefits, risks and side effects of this procedure; the likelihood of the patient achieving goals; and potential problems that might occur during recuperation. I discussed reasonable alternatives to the procedure, including risks, benefits and side effects related to the alternatives and risks related to not receiving this procedure. We will proceed with procedure as planned.

## 2023-11-17 LAB
ANION GAP SERPL CALC-SCNC: 6 MMOL/L (ref 0–18)
BASOPHILS # BLD AUTO: 0.02 X10(3) UL (ref 0–0.2)
BASOPHILS NFR BLD AUTO: 0.2 %
BUN BLD-MCNC: 23 MG/DL (ref 9–23)
BUN/CREAT SERPL: 21.5 (ref 10–20)
CALCIUM BLD-MCNC: 8.9 MG/DL (ref 8.7–10.4)
CHLORIDE SERPL-SCNC: 103 MMOL/L (ref 98–112)
CO2 SERPL-SCNC: 26 MMOL/L (ref 21–32)
CREAT BLD-MCNC: 1.07 MG/DL
DEPRECATED RDW RBC AUTO: 45 FL (ref 35.1–46.3)
EGFRCR SERPLBLD CKD-EPI 2021: 83 ML/MIN/1.73M2 (ref 60–?)
EOSINOPHIL # BLD AUTO: 0 X10(3) UL (ref 0–0.7)
EOSINOPHIL NFR BLD AUTO: 0 %
ERYTHROCYTE [DISTWIDTH] IN BLOOD BY AUTOMATED COUNT: 13.2 % (ref 11–15)
GLUCOSE BLD-MCNC: 137 MG/DL (ref 70–99)
HCT VFR BLD AUTO: 36.9 %
HGB BLD-MCNC: 11.6 G/DL
IMM GRANULOCYTES # BLD AUTO: 0.03 X10(3) UL (ref 0–1)
IMM GRANULOCYTES NFR BLD: 0.3 %
LYMPHOCYTES # BLD AUTO: 0.51 X10(3) UL (ref 1–4)
LYMPHOCYTES NFR BLD AUTO: 4.6 %
MAGNESIUM SERPL-MCNC: 1.8 MG/DL (ref 1.6–2.6)
MCH RBC QN AUTO: 28.9 PG (ref 26–34)
MCHC RBC AUTO-ENTMCNC: 31.4 G/DL (ref 31–37)
MCV RBC AUTO: 91.8 FL
MONOCYTES # BLD AUTO: 0.44 X10(3) UL (ref 0.1–1)
MONOCYTES NFR BLD AUTO: 4 %
NEUTROPHILS # BLD AUTO: 10.01 X10 (3) UL (ref 1.5–7.7)
NEUTROPHILS # BLD AUTO: 10.01 X10(3) UL (ref 1.5–7.7)
NEUTROPHILS NFR BLD AUTO: 90.9 %
OSMOLALITY SERPL CALC.SUM OF ELEC: 286 MOSM/KG (ref 275–295)
PHOSPHATE SERPL-MCNC: 3.7 MG/DL (ref 2.4–5.1)
PLATELET # BLD AUTO: 317 10(3)UL (ref 150–450)
POTASSIUM SERPL-SCNC: 4.7 MMOL/L (ref 3.5–5.1)
RBC # BLD AUTO: 4.02 X10(6)UL
SODIUM SERPL-SCNC: 135 MMOL/L (ref 136–145)
WBC # BLD AUTO: 11 X10(3) UL (ref 4–11)

## 2023-11-17 PROCEDURE — 99232 SBSQ HOSP IP/OBS MODERATE 35: CPT | Performed by: HOSPITALIST

## 2023-11-17 RX ORDER — MAGNESIUM OXIDE 400 MG/1
400 TABLET ORAL ONCE
Status: COMPLETED | OUTPATIENT
Start: 2023-11-17 | End: 2023-11-17

## 2023-11-17 NOTE — CONSULTS
[facility]    PATIENT'S NAME: Jada Renteria   ATTENDING PHYSICIAN: Karla Villavicencio DO   CONSULTING PHYSICIAN: Lopez Aguiar MD   PATIENT ACCOUNT#:   [id number]    LOCATION:  [address] [place] [address] RECORD #:   [id number]       YOB: 1970  ADMISSION DATE:       11/16/2023      CONSULT DATE:  11/16/2023    REPORT OF CONSULTATION    ####EDITING MAY BE REQUIRED####    REASON FOR ADMISSION:  ______ hernia, incarcerated ventral hernia for surgical repair. HISTORY OF PRESENT ILLNESS:  Patient is a 59-year-old  male with multiple abdominal surgeries including low anterior resection for perforated sigmoidectomy, colostomy, and colostomy reversal, who has been experiencing abdominal pain. Was seen by his surgeon, Dr. Henri Duckworth, and today he was scheduled for ventral hernia repair procedure. Postoperatively, he was brought into PACU for further monitoring. PAST MEDICAL HISTORY:  Obtained from the record. Recurrent diverticulitis, hypertension, and hyperlipidemia. PAST SURGICAL HISTORY:  In 2019, he presented with perforated sigmoid diverticulitis, requiring low anterior resection, diverting Fariba colostomy procedure. Then later on that year, he had parastomal hernia repair and Fariba pouch reversal.  Recently, imaging studies showed large defect within the ventral abdominal wall without strangulated or incarcerated hernia. Also had a remote appendectomy. MEDICATIONS:  Please see medication reconciliation list.    ALLERGIES:  No known drug allergies. FAMILY HISTORY:  Positive for hypertension. SOCIAL HISTORY:  No tobacco, alcohol, or drug use. Lives with his family. REVIEW OF SYSTEMS:  Patient reports current abdominal pain. No chest pain. No shortness of breath. Other 12-point review of systems is negative. PHYSICAL EXAMINATION:  GENERAL:  Alert and oriented to time, place and person. Mild to moderate distress.   VITAL SIGNS:  Temperature 100.0, pulse 72, respiratory rate 18, blood pressure 135/71, pulse ox 97% on room air. HEENT:  Atraumatic. Oropharynx clear. Dry mucous membranes. Normal hard and soft palate. Eyes:  Anicteric sclerae. Pupils are equal, round and reactive. NECK:  Supple. No lymphadenopathy. Trachea midline. Full range of motion. LUNGS:  Clear to auscultation bilaterally. Normal respiratory effort. HEART:  Regular rate and rhythm. S1 and S2 auscultated. No murmur. ABDOMEN:  Soft, obese. Mid abdominal area vertical wound dressing and Prevena wound VAC. Bilateral lower quadrant JES surgical drain on each side. Hypoactive bowel sounds. EXTREMITIES:  No peripheral edema, clubbing or cyanosis. NEUROLOGIC:  Motor and sensory intact. ASSESSMENT AND PLAN:  1. Ventral hernia with chronic incarcerated small bowel status post exploratory laparotomy, open repair of chronically incarcerated incisional hernia, ventral hernia repair, with implantation of mesh, 20 cm hernia defect. Pain control. Monitor surgical drains. Clear liquid diet. Monitor hemodynamic status. DVT prophylaxis. 2.   Obesity. Monitor respiratory and hemodynamic status. 3.   Essential hypertension. Continue home medications and monitor.     Dictated By Amauri Galicia MD  d: 11/16/2023 19:41:38  t: 11/16/2023 19:56:30  Job [id number]  /

## 2023-11-17 NOTE — PHYSICAL THERAPY NOTE
PHYSICAL THERAPY EVALUATION - INPATIENT     Room Number: 934B/622-Z  Evaluation Date: 11/17/2023  Type of Evaluation: Initial   Physician Order: PT Eval and Treat    Presenting Problem: recurrent incarcerated incisional ventral hernia, s/p exploratory laparotomy, repair of recurrent incarcerated ventral hernia with mesh, lysis of adhesions     Reason for Therapy: Mobility Dysfunction and Discharge Planning    PHYSICAL THERAPY ASSESSMENT     Patient is a 48year old male admitted 11/16/2023 for recurrent incarcerated incisional ventral hernia, s/p exploratory laparotomy, repair of recurrent incarcerated ventral hernia with mesh, lysis of adhesions. Patient's current functional deficits include impaired bed mobility, transfers, gait, stair negotiation, pain management, and tolerance for activity, which are below the patient's pre-admission status. Patient in bed with wife present upon arrival. RN approved activity. Educated patient on POC and benefits of mobilization. Agreeable to participate. Patient reporting abdominal pain, rated 5 out of 10 per the pain scale. Educated patient on abdominal sparing techniques, log rolling, and abdominal splinting in order to minimize pain while performing functional mobility tasks. Educated patient on frequent position changes and progressive mobility. Patient and wife verbalizing understanding of all education. Patient benefits from rest breaks and increased time in order to complete mobilization. Bed Mobility: Min A supine>EOB via log roll technique, increased time to complete. Cues provided for proper sequencing. Patient tolerated sitting EOB approx 8 minutes, requiring BUE support and SBA in order to maintain static sitting balance. Transfers: CGA sit<>stand with RW; cues provided for appropriate UE placement during functional transfers. Instruction on activity pacing upon standing to allow body time to acclimate to change in position.  Tolerated static standing with BUE support on RW and CGA for approx 3 minutes prior to mobilization. Ambulation: CGA>SBA with RW for 100 ft x 2; decreased merna speed, shuffled steps, slightly guarded. Cues for safe management of RW with turns. One short standing rest break taken in between ambulation trials. Patient sitting in bedside chair at end of session. Needs in reach and wife present. RN aware. The patient's Approx Degree of Impairment: 46.58% has been calculated based on documentation in the [facility] '6 clicks' Inpatient Basic Mobility Short Form. Research supports that patients with this level of impairment may benefit from [address], however, anticipate that patient will progress to be able to return home with initial 24 hour supervision/care. Patient will benefit from continued IP PT services to address these deficits in preparation for discharge. DISCHARGE RECOMMENDATIONS  PT Discharge Recommendations: Home;24 hour care/supervision    PLAN  PT Treatment Plan: Bed mobility; Body mechanics; Coordination; Endurance; Energy conservation;Patient education;Gait training;Stair training;Transfer training;Balance training  Rehab Potential : Good  Frequency (Obs): Daily       PHYSICAL THERAPY MEDICAL/SOCIAL HISTORY     Problem List  Active Problems:    Preop testing    Recurrent ventral incisional hernia with obstruction      HOME SITUATION  Home Situation  Type of Home: House  Home Layout: Multi-level;Stairlift  Stairs to Enter : 1  Railing: No  Stairs to Bedroom: 7  Railing: Yes  Lives With: Spouse  Drives: Yes  Patient Owned Equipment: Rolling walker  Patient Regularly Uses: Glasses     Prior Level of [place]: Independent with ADLs/iADLs/functional mobility, driving    SUBJECTIVE  \"I've been through this before\"    PHYSICAL THERAPY EXAMINATION     OBJECTIVE  Precautions: Abdominal protective strategies;JES tube (wound vac)  Fall Risk: Standard fall risk    PAIN ASSESSMENT  Ratin  Location: abdomen  Management Techniques:  Activity promotion; Body mechanics;Repositioning    COGNITION  Overall Cognitive Status:  WFL - within functional limits    RANGE OF MOTION AND STRENGTH ASSESSMENT  Upper extremity ROM and strength are within functional limits   Lower extremity ROM is within functional limits   Lower extremity strength is within functional limits     BALANCE  Static Sitting: Good  Dynamic Sitting: Fair +  Static Standing: Fair  Dynamic Standing: Fair -    ACTIVITY TOLERANCE  Pulse: 77  Heart Rate Source: Monitor     BP: 140/70  BP Location: Right arm  BP Method: Automatic  Patient Position: Sitting    AM-PAC '6-Clicks' INPATIENT SHORT FORM - BASIC MOBILITY  How much difficulty does the patient currently have. .. Patient Difficulty: Turning over in bed (including adjusting bedclothes, sheets and blankets)?: A Little   Patient Difficulty: Sitting down on and standing up from a chair with arms (e.g., wheelchair, bedside commode, etc.): A Little   Patient Difficulty: Moving from lying on back to sitting on the side of the bed?: A Little   How much help from another person does the patient currently need. ..    Help from Another: Moving to and from a bed to a chair (including a wheelchair)?: A Little   Help from Another: Need to walk in hospital room?: A Little   Help from Another: Climbing 3-5 steps with a railing?: A Little     AM-PAC Score:  Raw Score: 18   Approx Degree of Impairment: 46.58%   Standardized Score (AM-PAC Scale): 43.63   CMS Modifier (G-Code): CK    FUNCTIONAL ABILITY STATUS  Functional Mobility/Gait Assessment  Gait Assistance: Contact guard assist (Stand by assistance)  Distance (ft): 100 ft x 2  Assistive Device: Rolling walker  Pattern: Shuffle (decreased merna speed, slightly guarded)    Exercise/Education Provided:  Bed mobility  Body mechanics  Energy conservation  Functional activity tolerated  Gait training  Posture  Transfer training    Patient End of Session: Up in chair;Needs met;Call light within reach;RN aware of session/findings; All patient questions and concerns addressed; Family present    CURRENT GOALS    Goals to be met by: 11/24/23  Patient Goal Patient's self-stated goal is: go home   Goal #1 Patient is able to demonstrate supine - sit EOB @ level: supervision     Goal #1   Current Status    Goal #2 Patient is able to demonstrate transfers Sit to/from Stand at assistance level: supervision with  LRAD     Goal #2  Current Status    Goal #3 Patient is able to ambulate 300 feet with assist device:  LRAD  at assistance level: supervision   Goal #3   Current Status    Goal #4 Patient will negotiate 7 stairs/one curb w/ assistive device and CGA   Goal #4   Current Status    Goal #5 Patient to demonstrate independence with home activity/exercise instructions provided to patient in preparation for discharge.    Goal #5   Current Status      Patient Evaluation Complexity Level:  History Low - no personal factors and/or co-morbidities   Examination of body systems Low - addressing 1-2 elements   Clinical Presentation Low - Stable   Clinical Decision Making Low Complexity       Gait Training: 15 minutes  Therapeutic Activity: 23 minutes

## 2023-11-17 NOTE — OPERATIVE REPORT
[place] [address] OPERATIVE REPORT:     PATIENT NAME: Leala Najjar  : 1970   MRN: [id number]  SITE: [address]:   2023    PREOPERATIVE DIAGNOSIS: chronically incarcerated Incisional Ventral  Hernia recurrent     POSTOPERATIVE DIAGNOSIS:chronically incarcerated   Incisional Ventral   Hernia recurrent recurrent     PROCEDURE PERFORMED: Exploratory laparotomy with open repair of  chronically incarcerated  Incisional Ventral Hernia Repair with implantation of mesh  20 cm  hernia defect    SURGEON:  Génesis Osman MD    ASST: TAVIA Mcarthur (Assistant helped position patient and helped with positioning, retraction, suturing, closure, dressings etc.)    Second assist Cody Handley CSA    ANESTHESIA: General anesthesia     ESTIMATED BLOOD LOSS:   75 ml    COMPLICATIONS: none    INDICATIONS:  This is a 48year old male presents with a large recurrent complex chronically incarcerated  Incisional Ventral     Hernia which is symptomatic. I had a length discussion with the patient as to the etiology of the above. I discussed options of continued observation versus surgical repair. The risks of the procedure including bleeding, infection, postoperative hematoma, wound infection, nonhealing wound, scar formation, recurrent hernia if mesh is used, infected mesh, chronic pain overlying the repair site, seroma formation, as well as risks with anesthesia including myocardial infarction, respiratory failure, renal failure, pulmonary embolus, DVT, and even death were all discussed in detail with the patient who understands, consents and wishes to proceed with the operation    OPERATION:  The patient was brought to the operating room and placed on the operating room table in the supine position. General anesthetic was administered by endotracheal tube by anesthesia. The patient's abdomen was prepped and draped in the routine sterile fashion.  A  curvilinear incision was made with a #10 blade overlying the previous surgical scar. This was excised with electrocautery. The recurrent chronically incarcerated  Incisional Ventral   Hernia ventral hernia was identified and dissected free circumferentially down to fascia. Hernia sac was excised electrocautery. Extensive lysis of adhesions was performed freeing up the bowel from the chronically incarcerated care was taken to avoid injury to the bowel. After the polyp completely freed up the undersurface of the fascia was visualized. The fascia was trimmed down back to normal fascia. The previous hernia repair with biologic mesh was identified and was intact. The hernia defect was opened and the undersurface of the fascia was dissected free circumferentially with electrocautery. The fascial defect was 20   cm in size. A Synecor IP large    ovalmesh 15 x 20 cm  cm in size was placed with the ST portion against the omentum and the Marlex portion against the fascia. This was secured in place circumferentially with #1 Prolene simple interrupted sutures circumferentially. Sutures were secured in place. 0 Ethibond simple interrupted sutures. There was no undue tension present. The fascia was then closed overlying the mass with the oh looped PDS running suture multiple short segments. The wound was irrigated thoroughly with saline solution. 210 mm supraventricular drains were placed in the subcutaneous tissue and brought through separate stab wounds. Drains were secured in place with 2-0 silk suture. The subcutaneous tissue as well as the umbilicus was tacked down to the fascia with 2-0 Vicryl simple interrupted sutures along the entire course. The skin approximated with surgical staples. A Prevena wound VAC was applied to the wound to prevent and decrease wound complications. Sterile dressings were applied to the drain sites.   The patient was transferred off the operative table to recovery room in stable condition. All counts were correct at the end of the case. The role of my Assistant was critical to the operation.   They acted with exposure, retraction, and suturing as well as wound closure          [address] [facility]    11/16/2023  7:17 PM  Robby Zurita MD

## 2023-11-17 NOTE — BRIEF OP NOTE
Pre-Operative Diagnosis: Recurrent incarcerated incisional ventral hernia     Post-Operative Diagnosis: Recurrent incarcerated incisional ventral hernia      Procedure Performed:   Exploratory laparotomy, repair of recurrent incarcerated incisional ventral hernia with mesh 20 cm    Surgeon(s) and Role:     Nancie Vincent MD - Primary    Assistant(s):  Surgical Assistant.: Glenna Cruz CSA  PA: Rodney Hill     Surgical Findings: Recurrent incarcerated incisional     Specimen: Hernia sac     Estimated Blood Loss: Blood Output: 75 mL (11/16/2023  7:14 PM)      Dictation Number:      Ron Ayala MD  11/16/2023  7:16 PM

## 2023-11-18 LAB
ANION GAP SERPL CALC-SCNC: 4 MMOL/L (ref 0–18)
BASOPHILS # BLD AUTO: 0.03 X10(3) UL (ref 0–0.2)
BASOPHILS NFR BLD AUTO: 0.2 %
BUN BLD-MCNC: 15 MG/DL (ref 9–23)
BUN/CREAT SERPL: 16.9 (ref 10–20)
CALCIUM BLD-MCNC: 8.9 MG/DL (ref 8.7–10.4)
CHLORIDE SERPL-SCNC: 105 MMOL/L (ref 98–112)
CO2 SERPL-SCNC: 28 MMOL/L (ref 21–32)
CREAT BLD-MCNC: 0.89 MG/DL
DEPRECATED RDW RBC AUTO: 44.5 FL (ref 35.1–46.3)
EGFRCR SERPLBLD CKD-EPI 2021: 102 ML/MIN/1.73M2 (ref 60–?)
EOSINOPHIL # BLD AUTO: 0.16 X10(3) UL (ref 0–0.7)
EOSINOPHIL NFR BLD AUTO: 1.2 %
ERYTHROCYTE [DISTWIDTH] IN BLOOD BY AUTOMATED COUNT: 13.4 % (ref 11–15)
GLUCOSE BLD-MCNC: 113 MG/DL (ref 70–99)
HCT VFR BLD AUTO: 34.1 %
HGB BLD-MCNC: 11.2 G/DL
IMM GRANULOCYTES # BLD AUTO: 0.05 X10(3) UL (ref 0–1)
IMM GRANULOCYTES NFR BLD: 0.4 %
LYMPHOCYTES # BLD AUTO: 1.83 X10(3) UL (ref 1–4)
LYMPHOCYTES NFR BLD AUTO: 14.1 %
MAGNESIUM SERPL-MCNC: 1.9 MG/DL (ref 1.6–2.6)
MCH RBC QN AUTO: 29.6 PG (ref 26–34)
MCHC RBC AUTO-ENTMCNC: 32.8 G/DL (ref 31–37)
MCV RBC AUTO: 90.2 FL
MONOCYTES # BLD AUTO: 1.08 X10(3) UL (ref 0.1–1)
MONOCYTES NFR BLD AUTO: 8.3 %
NEUTROPHILS # BLD AUTO: 9.87 X10 (3) UL (ref 1.5–7.7)
NEUTROPHILS # BLD AUTO: 9.87 X10(3) UL (ref 1.5–7.7)
NEUTROPHILS NFR BLD AUTO: 75.8 %
OSMOLALITY SERPL CALC.SUM OF ELEC: 286 MOSM/KG (ref 275–295)
PHOSPHATE SERPL-MCNC: 3.2 MG/DL (ref 2.4–5.1)
PLATELET # BLD AUTO: 273 10(3)UL (ref 150–450)
POTASSIUM SERPL-SCNC: 3.9 MMOL/L (ref 3.5–5.1)
RBC # BLD AUTO: 3.78 X10(6)UL
SODIUM SERPL-SCNC: 137 MMOL/L (ref 136–145)
WBC # BLD AUTO: 13 X10(3) UL (ref 4–11)

## 2023-11-18 PROCEDURE — 99233 SBSQ HOSP IP/OBS HIGH 50: CPT | Performed by: HOSPITALIST

## 2023-11-18 RX ORDER — MORPHINE SULFATE 4 MG/ML
4 INJECTION, SOLUTION INTRAMUSCULAR; INTRAVENOUS EVERY 2 HOUR PRN
Status: DISCONTINUED | OUTPATIENT
Start: 2023-11-18 | End: 2023-11-24

## 2023-11-18 RX ORDER — MORPHINE SULFATE 4 MG/ML
3 INJECTION, SOLUTION INTRAMUSCULAR; INTRAVENOUS EVERY 4 HOURS PRN
Status: DISCONTINUED | OUTPATIENT
Start: 2023-11-18 | End: 2023-11-24

## 2023-11-18 RX ORDER — DEXTROSE MONOHYDRATE, SODIUM CHLORIDE, AND POTASSIUM CHLORIDE 50; 1.49; 4.5 G/1000ML; G/1000ML; G/1000ML
INJECTION, SOLUTION INTRAVENOUS CONTINUOUS
Status: DISCONTINUED | OUTPATIENT
Start: 2023-11-18 | End: 2023-11-23

## 2023-11-19 LAB
ANION GAP SERPL CALC-SCNC: 5 MMOL/L (ref 0–18)
BASOPHILS # BLD AUTO: 0.05 X10(3) UL (ref 0–0.2)
BASOPHILS NFR BLD AUTO: 0.4 %
BUN BLD-MCNC: 11 MG/DL (ref 9–23)
BUN/CREAT SERPL: 12 (ref 10–20)
CALCIUM BLD-MCNC: 9.7 MG/DL (ref 8.7–10.4)
CHLORIDE SERPL-SCNC: 105 MMOL/L (ref 98–112)
CO2 SERPL-SCNC: 28 MMOL/L (ref 21–32)
CREAT BLD-MCNC: 0.92 MG/DL
DEPRECATED RDW RBC AUTO: 44.5 FL (ref 35.1–46.3)
EGFRCR SERPLBLD CKD-EPI 2021: 99 ML/MIN/1.73M2 (ref 60–?)
EOSINOPHIL # BLD AUTO: 0.41 X10(3) UL (ref 0–0.7)
EOSINOPHIL NFR BLD AUTO: 3.4 %
ERYTHROCYTE [DISTWIDTH] IN BLOOD BY AUTOMATED COUNT: 13.3 % (ref 11–15)
GLUCOSE BLD-MCNC: 104 MG/DL (ref 70–99)
HCT VFR BLD AUTO: 37.6 %
HGB BLD-MCNC: 12.1 G/DL
IMM GRANULOCYTES # BLD AUTO: 0.07 X10(3) UL (ref 0–1)
IMM GRANULOCYTES NFR BLD: 0.6 %
LYMPHOCYTES # BLD AUTO: 1.31 X10(3) UL (ref 1–4)
LYMPHOCYTES NFR BLD AUTO: 10.8 %
MAGNESIUM SERPL-MCNC: 1.9 MG/DL (ref 1.6–2.6)
MCH RBC QN AUTO: 29.3 PG (ref 26–34)
MCHC RBC AUTO-ENTMCNC: 32.2 G/DL (ref 31–37)
MCV RBC AUTO: 91 FL
MONOCYTES # BLD AUTO: 1.06 X10(3) UL (ref 0.1–1)
MONOCYTES NFR BLD AUTO: 8.7 %
NEUTROPHILS # BLD AUTO: 9.24 X10 (3) UL (ref 1.5–7.7)
NEUTROPHILS # BLD AUTO: 9.24 X10(3) UL (ref 1.5–7.7)
NEUTROPHILS NFR BLD AUTO: 76.1 %
OSMOLALITY SERPL CALC.SUM OF ELEC: 286 MOSM/KG (ref 275–295)
PHOSPHATE SERPL-MCNC: 3.3 MG/DL (ref 2.4–5.1)
PLATELET # BLD AUTO: 311 10(3)UL (ref 150–450)
POTASSIUM SERPL-SCNC: 3.8 MMOL/L (ref 3.5–5.1)
RBC # BLD AUTO: 4.13 X10(6)UL
SODIUM SERPL-SCNC: 138 MMOL/L (ref 136–145)
WBC # BLD AUTO: 12.1 X10(3) UL (ref 4–11)

## 2023-11-19 PROCEDURE — 99233 SBSQ HOSP IP/OBS HIGH 50: CPT | Performed by: HOSPITALIST

## 2023-11-19 RX ORDER — ACETAMINOPHEN 500 MG
1000 TABLET ORAL EVERY 8 HOURS
Status: DISCONTINUED | OUTPATIENT
Start: 2023-11-19 | End: 2023-11-24

## 2023-11-19 RX ORDER — TEMAZEPAM 7.5 MG/1
7.5 CAPSULE ORAL NIGHTLY PRN
Status: DISCONTINUED | OUTPATIENT
Start: 2023-11-19 | End: 2023-11-24

## 2023-11-19 RX ORDER — METOCLOPRAMIDE HYDROCHLORIDE 5 MG/ML
10 INJECTION INTRAMUSCULAR; INTRAVENOUS EVERY 6 HOURS PRN
Status: DISCONTINUED | OUTPATIENT
Start: 2023-11-19 | End: 2023-11-24

## 2023-11-19 RX ORDER — TRAMADOL HYDROCHLORIDE 50 MG/1
50 TABLET ORAL EVERY 6 HOURS PRN
Status: DISCONTINUED | OUTPATIENT
Start: 2023-11-19 | End: 2023-11-19

## 2023-11-20 ENCOUNTER — APPOINTMENT (OUTPATIENT)
Dept: GENERAL RADIOLOGY | Facility: HOSPITAL | Age: 53
End: 2023-11-20
Attending: SURGERY
Payer: COMMERCIAL

## 2023-11-20 ENCOUNTER — APPOINTMENT (OUTPATIENT)
Dept: GENERAL RADIOLOGY | Facility: HOSPITAL | Age: 53
DRG: 336 | End: 2023-11-20
Attending: SURGERY
Payer: COMMERCIAL

## 2023-11-20 LAB
ANION GAP SERPL CALC-SCNC: 5 MMOL/L (ref 0–18)
BASOPHILS # BLD AUTO: 0.03 X10(3) UL (ref 0–0.2)
BASOPHILS NFR BLD AUTO: 0.2 %
BUN BLD-MCNC: 12 MG/DL (ref 9–23)
BUN/CREAT SERPL: 13.6 (ref 10–20)
CALCIUM BLD-MCNC: 9.1 MG/DL (ref 8.7–10.4)
CHLORIDE SERPL-SCNC: 102 MMOL/L (ref 98–112)
CO2 SERPL-SCNC: 28 MMOL/L (ref 21–32)
CREAT BLD-MCNC: 0.88 MG/DL
DEPRECATED RDW RBC AUTO: 43.3 FL (ref 35.1–46.3)
EGFRCR SERPLBLD CKD-EPI 2021: 103 ML/MIN/1.73M2 (ref 60–?)
EOSINOPHIL # BLD AUTO: 0.5 X10(3) UL (ref 0–0.7)
EOSINOPHIL NFR BLD AUTO: 3.6 %
ERYTHROCYTE [DISTWIDTH] IN BLOOD BY AUTOMATED COUNT: 13 % (ref 11–15)
GLUCOSE BLD-MCNC: 111 MG/DL (ref 70–99)
HCT VFR BLD AUTO: 36.4 %
HGB BLD-MCNC: 11.8 G/DL
IMM GRANULOCYTES # BLD AUTO: 0.06 X10(3) UL (ref 0–1)
IMM GRANULOCYTES NFR BLD: 0.4 %
LYMPHOCYTES # BLD AUTO: 1.51 X10(3) UL (ref 1–4)
LYMPHOCYTES NFR BLD AUTO: 10.9 %
MCH RBC QN AUTO: 29.5 PG (ref 26–34)
MCHC RBC AUTO-ENTMCNC: 32.4 G/DL (ref 31–37)
MCV RBC AUTO: 91 FL
MONOCYTES # BLD AUTO: 1.03 X10(3) UL (ref 0.1–1)
MONOCYTES NFR BLD AUTO: 7.5 %
NEUTROPHILS # BLD AUTO: 10.66 X10 (3) UL (ref 1.5–7.7)
NEUTROPHILS # BLD AUTO: 10.66 X10(3) UL (ref 1.5–7.7)
NEUTROPHILS NFR BLD AUTO: 77.4 %
OSMOLALITY SERPL CALC.SUM OF ELEC: 280 MOSM/KG (ref 275–295)
PLATELET # BLD AUTO: 303 10(3)UL (ref 150–450)
POTASSIUM SERPL-SCNC: 3.9 MMOL/L (ref 3.5–5.1)
RBC # BLD AUTO: 4 X10(6)UL
SODIUM SERPL-SCNC: 135 MMOL/L (ref 136–145)
WBC # BLD AUTO: 13.8 X10(3) UL (ref 4–11)

## 2023-11-20 PROCEDURE — 74019 RADEX ABDOMEN 2 VIEWS: CPT | Performed by: SURGERY

## 2023-11-20 PROCEDURE — 99233 SBSQ HOSP IP/OBS HIGH 50: CPT | Performed by: HOSPITALIST

## 2023-11-21 ENCOUNTER — APPOINTMENT (OUTPATIENT)
Dept: GENERAL RADIOLOGY | Facility: HOSPITAL | Age: 53
End: 2023-11-21
Attending: SURGERY
Payer: COMMERCIAL

## 2023-11-21 ENCOUNTER — APPOINTMENT (OUTPATIENT)
Dept: GENERAL RADIOLOGY | Facility: HOSPITAL | Age: 53
DRG: 336 | End: 2023-11-21
Attending: SURGERY
Payer: COMMERCIAL

## 2023-11-21 PROCEDURE — 99233 SBSQ HOSP IP/OBS HIGH 50: CPT | Performed by: HOSPITALIST

## 2023-11-21 PROCEDURE — 74019 RADEX ABDOMEN 2 VIEWS: CPT | Performed by: SURGERY

## 2023-11-21 NOTE — PHYSICAL THERAPY NOTE
PHYSICAL THERAPY TREATMENT NOTE - INPATIENT     Room Number: 649A/481-V       Presenting Problem: recurrent incarcerated incisional ventral hernia, s/p exploratory laparotomy, repair of recurrent incarcerated ventral hernia with mesh, lysis of adhesions       Problem List  Active Problems:    Preop testing    Recurrent ventral incisional hernia with obstruction      PHYSICAL THERAPY ASSESSMENT     Patient standing up in room upon arrival. RN approved activity. Educated patient on POC and benefits of mobilization. Agreeable to participate. Patient reporting no pain. Patient making significant progress towards IP PT goals, tolerating increased ambulation and stair negotiation. Patient reports that he walks in the hallway multiple times a day with his family. Based on patient demonstrating good understanding of post operative protocol and safe functional mobility observed this date, no further skilled IP PT required at this time. RN made aware. Patient agreeable. Safe for DC to home from a PT standpoint. Ambulation: Supervision without assistive device for 450 ft; gait pattern WFL. Stair Negotiation: Supervision ascend/descend 4 stairs with 2 hand rails in a reciprocal pattern. Patient able to slowly and safely negotiate stairs in this manner. Transfers: Supervision stand>sit without assistive device. Patient sitting in bedside chair at end of session. Needs in reach. RN aware. The patient's Approx Degree of Impairment: 35.83% has been calculated based on documentation in the [facility] '6 clicks' Inpatient Basic Mobility Short Form. Research supports that patients with this level of impairment may benefit from home with initial 24 hour supervision/care. DISCHARGE RECOMMENDATIONS  PT Discharge Recommendations: Home;24 hour care/supervision     PLAN  PT Treatment Plan: Bed mobility; Body mechanics; Coordination; Endurance; Energy conservation;Patient education;Gait training;Stair training;Transfer training;Balance training  Frequency (Obs): Daily    SUBJECTIVE  \"I've been walking a lot\"    OBJECTIVE  Precautions: Abdominal protective strategies;JES tube (wound vac)    PAIN ASSESSMENT   Ratin  Location: abdomen  Management Techniques: Activity promotion; Body mechanics;Repositioning    BALANCE  Static Sitting: Normal  Dynamic Sitting: Normal  Static Standing: Good  Dynamic Standing: Good    AM-PAC '6-Clicks' INPATIENT SHORT FORM - BASIC MOBILITY  How much difficulty does the patient currently have. .. Patient Difficulty: Turning over in bed (including adjusting bedclothes, sheets and blankets)?: None   Patient Difficulty: Sitting down on and standing up from a chair with arms (e.g., wheelchair, bedside commode, etc.): A Little   Patient Difficulty: Moving from lying on back to sitting on the side of the bed?: None   How much help from another person does the patient currently need. .. Help from Another: Moving to and from a bed to a chair (including a wheelchair)?: A Little   Help from Another: Need to walk in hospital room?: A Little   Help from Another: Climbing 3-5 steps with a railing?: A Little     AM-PAC Score:  Raw Score: 20   Approx Degree of Impairment: 35.83%   Standardized Score (AM-PAC Scale): 47.67   CMS Modifier (G-Code): CJ    FUNCTIONAL ABILITY STATUS  Functional Mobility/Gait Assessment  Gait Assistance: Supervision  Distance (ft): 450  Assistive Device: None  Pattern: Within Functional Limits  Stairs: Stairs  How Many Stairs: 4  Device: 2 Rails  Assist: Supervision  Pattern: Ascend and Descend  Ascend and Descend : Reciprocal    Patient End of Session: Up in chair;Needs met;RN aware of session/findings;Call light within reach; All patient questions and concerns addressed    CURRENT GOALS   Goals to be met by: 23  Patient Goal Patient's self-stated goal is: go home   Goal #1 Patient is able to demonstrate supine - sit EOB @ level: supervision      Goal #1   Current Status  NT received standing up in room   Goal #2 Patient is able to demonstrate transfers Sit to/from Stand at assistance level: supervision with  LRAD      Goal #2  Current Status  Supervision without assistive device   Goal #3 Patient is able to ambulate 300 feet with assist device:  LRAD  at assistance level: supervision   Goal #3   Current Status  Supervision 450 ft without assistive device   Goal #4 Patient will negotiate 7 stairs/one curb w/ assistive device and CGA   Goal #4   Current Status  Supervision 4 stairs with 2 rails   Goal #5 Patient to demonstrate independence with home activity/exercise instructions provided to patient in preparation for discharge.    Goal #5   Current Status  Ongoing     Gait Trainin minutes

## 2023-11-22 ENCOUNTER — APPOINTMENT (OUTPATIENT)
Dept: GENERAL RADIOLOGY | Facility: HOSPITAL | Age: 53
DRG: 336 | End: 2023-11-22
Attending: SURGERY
Payer: COMMERCIAL

## 2023-11-22 ENCOUNTER — APPOINTMENT (OUTPATIENT)
Dept: GENERAL RADIOLOGY | Facility: HOSPITAL | Age: 53
End: 2023-11-22
Attending: SURGERY
Payer: COMMERCIAL

## 2023-11-22 LAB
ALBUMIN SERPL-MCNC: 3.6 G/DL (ref 3.2–4.8)
ALP LIVER SERPL-CCNC: 78 U/L
ALT SERPL-CCNC: 53 U/L
ANION GAP SERPL CALC-SCNC: 5 MMOL/L (ref 0–18)
AST SERPL-CCNC: 30 U/L (ref ?–34)
BASOPHILS # BLD AUTO: 0.03 X10(3) UL (ref 0–0.2)
BASOPHILS NFR BLD AUTO: 0.3 %
BILIRUB DIRECT SERPL-MCNC: 0.3 MG/DL (ref ?–0.3)
BILIRUB SERPL-MCNC: 0.6 MG/DL (ref 0.3–1.2)
BUN BLD-MCNC: 11 MG/DL (ref 9–23)
BUN/CREAT SERPL: 14.7 (ref 10–20)
CALCIUM BLD-MCNC: 8.8 MG/DL (ref 8.7–10.4)
CHLORIDE SERPL-SCNC: 104 MMOL/L (ref 98–112)
CO2 SERPL-SCNC: 26 MMOL/L (ref 21–32)
CREAT BLD-MCNC: 0.75 MG/DL
DEPRECATED RDW RBC AUTO: 44 FL (ref 35.1–46.3)
EGFRCR SERPLBLD CKD-EPI 2021: 108 ML/MIN/1.73M2 (ref 60–?)
EOSINOPHIL # BLD AUTO: 0.6 X10(3) UL (ref 0–0.7)
EOSINOPHIL NFR BLD AUTO: 5.6 %
ERYTHROCYTE [DISTWIDTH] IN BLOOD BY AUTOMATED COUNT: 13.2 % (ref 11–15)
GLUCOSE BLD-MCNC: 99 MG/DL (ref 70–99)
HCT VFR BLD AUTO: 33.2 %
HGB BLD-MCNC: 10.7 G/DL
IMM GRANULOCYTES # BLD AUTO: 0.07 X10(3) UL (ref 0–1)
IMM GRANULOCYTES NFR BLD: 0.6 %
LYMPHOCYTES # BLD AUTO: 1.57 X10(3) UL (ref 1–4)
LYMPHOCYTES NFR BLD AUTO: 14.6 %
MAGNESIUM SERPL-MCNC: 1.8 MG/DL (ref 1.6–2.6)
MCH RBC QN AUTO: 29.1 PG (ref 26–34)
MCHC RBC AUTO-ENTMCNC: 32.2 G/DL (ref 31–37)
MCV RBC AUTO: 90.2 FL
MONOCYTES # BLD AUTO: 0.99 X10(3) UL (ref 0.1–1)
MONOCYTES NFR BLD AUTO: 9.2 %
NEUTROPHILS # BLD AUTO: 7.51 X10 (3) UL (ref 1.5–7.7)
NEUTROPHILS # BLD AUTO: 7.51 X10(3) UL (ref 1.5–7.7)
NEUTROPHILS NFR BLD AUTO: 69.7 %
OSMOLALITY SERPL CALC.SUM OF ELEC: 279 MOSM/KG (ref 275–295)
PHOSPHATE SERPL-MCNC: 4.6 MG/DL (ref 2.4–5.1)
PLATELET # BLD AUTO: 309 10(3)UL (ref 150–450)
POTASSIUM SERPL-SCNC: 4 MMOL/L (ref 3.5–5.1)
PROT SERPL-MCNC: 5.8 G/DL (ref 5.7–8.2)
RBC # BLD AUTO: 3.68 X10(6)UL
SODIUM SERPL-SCNC: 135 MMOL/L (ref 136–145)
WBC # BLD AUTO: 10.8 X10(3) UL (ref 4–11)

## 2023-11-22 PROCEDURE — 74250 X-RAY XM SM INT 1CNTRST STD: CPT | Performed by: SURGERY

## 2023-11-22 PROCEDURE — 99233 SBSQ HOSP IP/OBS HIGH 50: CPT | Performed by: HOSPITALIST

## 2023-11-22 RX ORDER — MAGNESIUM OXIDE 400 MG/1
400 TABLET ORAL ONCE
Status: COMPLETED | OUTPATIENT
Start: 2023-11-22 | End: 2023-11-22

## 2023-11-23 ENCOUNTER — APPOINTMENT (OUTPATIENT)
Dept: GENERAL RADIOLOGY | Facility: HOSPITAL | Age: 53
End: 2023-11-23
Attending: SURGERY
Payer: COMMERCIAL

## 2023-11-23 ENCOUNTER — APPOINTMENT (OUTPATIENT)
Dept: GENERAL RADIOLOGY | Facility: HOSPITAL | Age: 53
DRG: 336 | End: 2023-11-23
Attending: SURGERY
Payer: COMMERCIAL

## 2023-11-23 LAB
ANION GAP SERPL CALC-SCNC: 6 MMOL/L (ref 0–18)
BASOPHILS # BLD AUTO: 0.06 X10(3) UL (ref 0–0.2)
BASOPHILS NFR BLD AUTO: 0.6 %
BUN BLD-MCNC: 12 MG/DL (ref 9–23)
BUN/CREAT SERPL: 14 (ref 10–20)
CALCIUM BLD-MCNC: 9.1 MG/DL (ref 8.7–10.4)
CHLORIDE SERPL-SCNC: 104 MMOL/L (ref 98–112)
CO2 SERPL-SCNC: 27 MMOL/L (ref 21–32)
CREAT BLD-MCNC: 0.86 MG/DL
DEPRECATED RDW RBC AUTO: 44.7 FL (ref 35.1–46.3)
EGFRCR SERPLBLD CKD-EPI 2021: 104 ML/MIN/1.73M2 (ref 60–?)
EOSINOPHIL # BLD AUTO: 0.73 X10(3) UL (ref 0–0.7)
EOSINOPHIL NFR BLD AUTO: 7 %
ERYTHROCYTE [DISTWIDTH] IN BLOOD BY AUTOMATED COUNT: 13.4 % (ref 11–15)
GLUCOSE BLD-MCNC: 95 MG/DL (ref 70–99)
HCT VFR BLD AUTO: 35.2 %
HGB BLD-MCNC: 11.2 G/DL
IMM GRANULOCYTES # BLD AUTO: 0.06 X10(3) UL (ref 0–1)
IMM GRANULOCYTES NFR BLD: 0.6 %
LYMPHOCYTES # BLD AUTO: 1.6 X10(3) UL (ref 1–4)
LYMPHOCYTES NFR BLD AUTO: 15.4 %
MAGNESIUM SERPL-MCNC: 1.9 MG/DL (ref 1.6–2.6)
MCH RBC QN AUTO: 29 PG (ref 26–34)
MCHC RBC AUTO-ENTMCNC: 31.8 G/DL (ref 31–37)
MCV RBC AUTO: 91.2 FL
MONOCYTES # BLD AUTO: 0.96 X10(3) UL (ref 0.1–1)
MONOCYTES NFR BLD AUTO: 9.2 %
NEUTROPHILS # BLD AUTO: 6.98 X10 (3) UL (ref 1.5–7.7)
NEUTROPHILS # BLD AUTO: 6.98 X10(3) UL (ref 1.5–7.7)
NEUTROPHILS NFR BLD AUTO: 67.2 %
OSMOLALITY SERPL CALC.SUM OF ELEC: 284 MOSM/KG (ref 275–295)
PHOSPHATE SERPL-MCNC: 4.2 MG/DL (ref 2.4–5.1)
PLATELET # BLD AUTO: 332 10(3)UL (ref 150–450)
POTASSIUM SERPL-SCNC: 3.9 MMOL/L (ref 3.5–5.1)
RBC # BLD AUTO: 3.86 X10(6)UL
SODIUM SERPL-SCNC: 137 MMOL/L (ref 136–145)
WBC # BLD AUTO: 10.4 X10(3) UL (ref 4–11)

## 2023-11-23 PROCEDURE — 99233 SBSQ HOSP IP/OBS HIGH 50: CPT | Performed by: HOSPITALIST

## 2023-11-23 PROCEDURE — 74019 RADEX ABDOMEN 2 VIEWS: CPT | Performed by: SURGERY

## 2023-11-23 RX ORDER — FAMOTIDINE 20 MG/1
20 TABLET, FILM COATED ORAL DAILY
Status: DISCONTINUED | OUTPATIENT
Start: 2023-11-23 | End: 2023-11-24

## 2023-11-23 NOTE — DIETARY NOTE
Educated pt on low fiber diet x3-4 weeks with progression to higher fiber diet as tolerated. Pt has had to follow this diet 2 other times and educated at those time. Handout and contact information provided.      [address] [facility], [address] [place] [address]   Clinical Dietitian [phone]

## 2023-11-23 NOTE — PLAN OF CARE
Pt is aox4, ambulating in the hallway. Voiding wnl. SCD for DVT prophylaxis. JES drain removed. New IV. Dressing on abdomen CDI with prevena wound vac. Tylenol for pain. Diet advanced per surgeon to soft. Pt plans to d/c TBD . Disease process discussed with pt. Bed in lowest position, call light and personal possessions within reach. Pt instructed to call for assistance before getting up.

## 2023-11-24 ENCOUNTER — APPOINTMENT (OUTPATIENT)
Dept: GENERAL RADIOLOGY | Facility: HOSPITAL | Age: 53
End: 2023-11-24
Attending: HOSPITALIST
Payer: COMMERCIAL

## 2023-11-24 ENCOUNTER — APPOINTMENT (OUTPATIENT)
Dept: GENERAL RADIOLOGY | Facility: HOSPITAL | Age: 53
DRG: 336 | End: 2023-11-24
Attending: HOSPITALIST
Payer: COMMERCIAL

## 2023-11-24 VITALS
HEIGHT: 70 IN | SYSTOLIC BLOOD PRESSURE: 120 MMHG | RESPIRATION RATE: 18 BRPM | TEMPERATURE: 98 F | BODY MASS INDEX: 36.88 KG/M2 | OXYGEN SATURATION: 100 % | HEART RATE: 78 BPM | DIASTOLIC BLOOD PRESSURE: 65 MMHG | WEIGHT: 257.63 LBS

## 2023-11-24 LAB
ANION GAP SERPL CALC-SCNC: 7 MMOL/L (ref 0–18)
BASOPHILS # BLD AUTO: 0.05 X10(3) UL (ref 0–0.2)
BASOPHILS NFR BLD AUTO: 0.5 %
BUN BLD-MCNC: 10 MG/DL (ref 9–23)
BUN/CREAT SERPL: 12.7 (ref 10–20)
CALCIUM BLD-MCNC: 9 MG/DL (ref 8.7–10.4)
CHLORIDE SERPL-SCNC: 104 MMOL/L (ref 98–112)
CO2 SERPL-SCNC: 24 MMOL/L (ref 21–32)
CREAT BLD-MCNC: 0.79 MG/DL
DEPRECATED RDW RBC AUTO: 43.8 FL (ref 35.1–46.3)
EGFRCR SERPLBLD CKD-EPI 2021: 106 ML/MIN/1.73M2 (ref 60–?)
EOSINOPHIL # BLD AUTO: 0.77 X10(3) UL (ref 0–0.7)
EOSINOPHIL NFR BLD AUTO: 7.3 %
ERYTHROCYTE [DISTWIDTH] IN BLOOD BY AUTOMATED COUNT: 13.3 % (ref 11–15)
GLUCOSE BLD-MCNC: 91 MG/DL (ref 70–99)
HCT VFR BLD AUTO: 34.4 %
HGB BLD-MCNC: 11.2 G/DL
IMM GRANULOCYTES # BLD AUTO: 0.09 X10(3) UL (ref 0–1)
IMM GRANULOCYTES NFR BLD: 0.9 %
LYMPHOCYTES # BLD AUTO: 1.6 X10(3) UL (ref 1–4)
LYMPHOCYTES NFR BLD AUTO: 15.1 %
MAGNESIUM SERPL-MCNC: 1.9 MG/DL (ref 1.6–2.6)
MCH RBC QN AUTO: 29.1 PG (ref 26–34)
MCHC RBC AUTO-ENTMCNC: 32.6 G/DL (ref 31–37)
MCV RBC AUTO: 89.4 FL
MONOCYTES # BLD AUTO: 0.95 X10(3) UL (ref 0.1–1)
MONOCYTES NFR BLD AUTO: 9 %
NEUTROPHILS # BLD AUTO: 7.12 X10 (3) UL (ref 1.5–7.7)
NEUTROPHILS # BLD AUTO: 7.12 X10(3) UL (ref 1.5–7.7)
NEUTROPHILS NFR BLD AUTO: 67.2 %
OSMOLALITY SERPL CALC.SUM OF ELEC: 279 MOSM/KG (ref 275–295)
PHOSPHATE SERPL-MCNC: 4.6 MG/DL (ref 2.4–5.1)
PLATELET # BLD AUTO: 300 10(3)UL (ref 150–450)
POTASSIUM SERPL-SCNC: 3.9 MMOL/L (ref 3.5–5.1)
RBC # BLD AUTO: 3.85 X10(6)UL
SODIUM SERPL-SCNC: 135 MMOL/L (ref 136–145)
WBC # BLD AUTO: 10.6 X10(3) UL (ref 4–11)

## 2023-11-24 PROCEDURE — 74019 RADEX ABDOMEN 2 VIEWS: CPT | Performed by: HOSPITALIST

## 2023-11-24 PROCEDURE — 99233 SBSQ HOSP IP/OBS HIGH 50: CPT | Performed by: HOSPITALIST

## 2023-11-24 RX ORDER — TRAMADOL HYDROCHLORIDE 50 MG/1
50 TABLET ORAL EVERY 6 HOURS PRN
Qty: 10 TABLET | Refills: 0 | Status: SHIPPED | OUTPATIENT
Start: 2023-11-24 | End: 2023-11-25

## 2023-11-24 RX ORDER — DOCUSATE SODIUM 100 MG/1
100 CAPSULE, LIQUID FILLED ORAL 2 TIMES DAILY
Qty: 14 CAPSULE | Refills: 0 | Status: SHIPPED | OUTPATIENT
Start: 2023-11-24 | End: 2023-12-01

## 2023-11-24 RX ORDER — ACETAMINOPHEN 500 MG
1000 TABLET ORAL EVERY 8 HOURS
Status: SHIPPED | COMMUNITY
Start: 2023-11-24

## 2023-11-24 NOTE — CDS QUERY
Frances Chavez  Dear Doctor: Krishna Calix    Based on your clinical judgement and clinical information provided below, can giraldo please select most appropriate condtion? PLEASE (X) ALL DIAGNOSES THAT APPLY TO   SELECTION BY PHYSICIAN ONLY    ( x)  ileus - post operative complication    ( ) ileus- Post operative expected    ( )  ileus - post operative, obstructive    ( )   small bowel obstruction      ( )  Other (please specify): ________________________________________        Documentation includes bowel sounds finding suggestive of impaired GI motility:   53yrs male with medical history of obesity, HTN, s/p Alejandro's procedure for perforated obstructing sigmoid diverticulitis on 8/31/2019 and closure of colostomy on 12/19/2019. Recently hospitalization for small bowel obstruction with nonoperative management admitted on 11/16/23 for Exploratory laparotomy, repair of recurrent incarcerated incisional ventral hernia with mesh and lysis of adhesion. Abdomen obstructive series: 11/20, 11/21,11/23 : postoperative ileus or partial small bowel obstruction. SMALL BOWEL CONTRAST 11/22 : small bowel obstruction versus a proximal small bowel ileus. 11/17-NPO with ice chips 11/18 - tolerated clear liquid diet overnight  11/19 - advance to full iqid diet   11/20 -BM, went back to clear liq diet d/t n/v.  11/21 clear liquid diet and tolerating well w/ no reports of n/v.   11/22 - Full liquid diet , no n/v pain 11/23 -advance to soft diet    Surgery notes: 11/17: Continue ice chips and sips of water only extensive lysis of adhesions concern for postop ileus   11/21-11/22  positive BM positive flatus  Will still obtain Gastrografin small bowel follow-through today to further evaluate for partial versus small bowel obstruction most likely resolving ileus    11/23: Small bowel follow-through without evidence of SBO.   Obstructive series continue with contrast in colon however patient still some mildly dilated small bowel loops. Patient had prolonged postop ileus after last colon surgery. Repeat obstructive series in a.m. Hopefully home tomorrow evening. If you have any questions, please contact Clinical :  Tracy Negrete at [phone]     Thank You!     THIS FORM IS A PERMANENT PART OF THE MEDICAL RECORD

## 2023-11-24 NOTE — DISCHARGE SUMMARY
Dc summary#[id number]  > 30 min spent on [address] Discharge Diagnoses: sbo    Lace+ Score: 47  59-90 High Risk  29-58 Medium Risk  0-28   Low Risk. TCM Follow-Up Recommendation:  LACE 29-58:  Moderate Risk of readmission after discharge from the hospital.tcm fu recommended

## 2023-11-27 NOTE — DISCHARGE SUMMARY
[facility]    PATIENT'S NAME: SHOLA DRAPER   ATTENDING PHYSICIAN: Adrian Plaza MD   PATIENT ACCOUNT#:   [id number]    LOCATION:  [address] [place] [address] RECORD #:   [id number]       YOB: 1970  ADMISSION DATE:       11/16/2023      DISCHARGE DATE:  11/24/2023    DISCHARGE SUMMARY    About 35 minutes were spent preparing this discharged. DISCHARGE DIAGNOSIS:  Ventral hernia repair with postoperative ileus. HISTORY AND HOSPITAL COURSE:  This is a very pleasant 51-year-old white male who presents with a history of having come into the hospital for an incarcerated ventral hernia repair. The patient had an implantation of mesh. He had a very slow recovery and very slow recovery of bowel function. He had upper GI and small-bowel followthrough x-rays and found to begin to pass gas. His pain was controlled. He was using incentive spirometry. He was able to eat a simple diet and we discharged him home. PHYSICAL EXAMINATION:    VITAL SIGNS:  On discharge, temperature 97.8, pulse 78, respiratory rate 18, blood pressure 120/65, 100%. LUNGS:  Occasional rhonchi. HEART:  Normal S1, S2. No S3.  ABDOMEN:  Soft. Tender in the surgical access sites. EXTREMITIES:  Without edema. NEUROLOGIC:  He is alert, oriented, friendly, and cooperative. LABORATORY DATA:  Please see chart. ASSESSMENT AND PLAN:    1. Status post laparotomy with very slow recovery of bowel function with prolonged postoperative ileus. The patient much improved. No actual small-bowel obstruction was found. 2.   Obesity, body mass index of 36.96. May need LUIS DANIEL workup. 3.   Hypertension. 4.   DVT prophylaxis. Subcutaneous heparin. CONDITION ON DISCHARGE:  Stable. CODE STATUS:  Full Code. DISCHARGE MEDICATIONS:    1. Aspirin 81 mg daily. 2.   Fenofibrate 1 tablet daily. 3.   Lisinopril 40 mg daily. 4.   Tylenol 1000 mg every 8 hours as needed.   5.   Colace 100 mg p.o. b.i.d. for 7 days.  6.   Tramadol 50 mg every 6 hours as needed for pain. DIET:  Soft food, low salt, low fat. ACTIVITY:  As tolerated. No heavy exercise. No driving until cleared by Dr. Dagoberto Cook. FOLLOWUP:  Dr. Dagoberto oCok in 1 week for all wound care and activity orders. Follow up with Dr. Henok Maurer in 1 week. Return if fevers, chills, sweats, nausea, vomiting, chest pain, shortness of breath, or other complaints. RISK OF READMISSION:  Moderate. TCM followup is recommended. Dictated By Jane Reynoso.  MD Bola  d: 11/25/2023 12:13:37  t: 11/27/2023 08:31:58  Job [id number]  LAS/

## 2024-05-20 ENCOUNTER — LAB REQUISITION (OUTPATIENT)
Dept: LAB | Age: 54
End: 2024-05-20

## 2024-05-20 DIAGNOSIS — E61.1 IRON DEFICIENCY: ICD-10-CM

## 2024-05-20 DIAGNOSIS — Z12.5 ENCOUNTER FOR SCREENING FOR MALIGNANT NEOPLASM OF PROSTATE: ICD-10-CM

## 2024-05-20 DIAGNOSIS — E78.5 HYPERLIPIDEMIA, UNSPECIFIED: ICD-10-CM

## 2024-05-24 ENCOUNTER — LAB SERVICES (OUTPATIENT)
Dept: LAB | Age: 54
End: 2024-05-24

## 2024-05-24 PROCEDURE — PSEU9050 PSA: Performed by: CLINICAL MEDICAL LABORATORY

## 2024-05-24 PROCEDURE — 80053 COMPREHEN METABOLIC PANEL: CPT | Performed by: CLINICAL MEDICAL LABORATORY

## 2024-05-24 PROCEDURE — 84153 ASSAY OF PSA TOTAL: CPT | Performed by: CLINICAL MEDICAL LABORATORY

## 2024-05-24 PROCEDURE — 83540 ASSAY OF IRON: CPT | Performed by: CLINICAL MEDICAL LABORATORY

## 2024-05-24 PROCEDURE — 83550 IRON BINDING TEST: CPT | Performed by: CLINICAL MEDICAL LABORATORY

## 2024-05-24 PROCEDURE — PSEU8203 IRON AND TOTAL IRON BINDING CAPACITY: Performed by: CLINICAL MEDICAL LABORATORY

## 2024-05-24 PROCEDURE — 80061 LIPID PANEL: CPT | Performed by: CLINICAL MEDICAL LABORATORY

## 2024-05-24 PROCEDURE — PSEU8135 LIPID PANEL WITH REFLEX: Performed by: CLINICAL MEDICAL LABORATORY

## 2024-05-24 PROCEDURE — PSEU8250 COMPREHENSIVE METABOLIC PANEL: Performed by: CLINICAL MEDICAL LABORATORY

## 2024-05-25 LAB
ALBUMIN SERPL-MCNC: 4.2 G/DL (ref 3.6–5.1)
ALBUMIN/GLOB SERPL: 1.4 {RATIO} (ref 1–2.4)
ALP SERPL-CCNC: 83 UNITS/L (ref 45–117)
ALT SERPL-CCNC: 41 UNITS/L
ANION GAP SERPL CALC-SCNC: 13 MMOL/L (ref 7–19)
AST SERPL-CCNC: 25 UNITS/L
BILIRUB SERPL-MCNC: 0.5 MG/DL (ref 0.2–1)
BUN SERPL-MCNC: 23 MG/DL (ref 6–20)
BUN/CREAT SERPL: 23 (ref 7–25)
CALCIUM SERPL-MCNC: 9.9 MG/DL (ref 8.4–10.2)
CHLORIDE SERPL-SCNC: 104 MMOL/L (ref 97–110)
CHOLEST SERPL-MCNC: 173 MG/DL
CHOLEST/HDLC SERPL: 4 {RATIO}
CO2 SERPL-SCNC: 26 MMOL/L (ref 21–32)
CREAT SERPL-MCNC: 0.98 MG/DL (ref 0.67–1.17)
EGFRCR SERPLBLD CKD-EPI 2021: >90 ML/MIN/{1.73_M2}
FASTING DURATION TIME PATIENT: 12 HOURS (ref 0–999)
GLOBULIN SER-MCNC: 3 G/DL (ref 2–4)
GLUCOSE SERPL-MCNC: 92 MG/DL (ref 70–99)
HDLC SERPL-MCNC: 43 MG/DL
IRON SATN MFR SERPL: 16 % (ref 15–45)
IRON SERPL-MCNC: 58 MCG/DL (ref 65–175)
LDLC SERPL CALC-MCNC: 107 MG/DL
NONHDLC SERPL-MCNC: 130 MG/DL
POTASSIUM SERPL-SCNC: 4.7 MMOL/L (ref 3.4–5.1)
PROT SERPL-MCNC: 7.2 G/DL (ref 6.4–8.2)
PSA SERPL-MCNC: 0.21 NG/ML
SODIUM SERPL-SCNC: 138 MMOL/L (ref 135–145)
TIBC SERPL-MCNC: 353 MCG/DL (ref 250–450)
TRIGL SERPL-MCNC: 113 MG/DL

## 2024-11-07 ENCOUNTER — LAB SERVICES (OUTPATIENT)
Dept: LAB | Age: 54
End: 2024-11-07

## 2024-11-07 ENCOUNTER — LAB REQUISITION (OUTPATIENT)
Dept: LAB | Age: 54
End: 2024-11-07

## 2024-11-07 DIAGNOSIS — E61.1 IRON DEFICIENCY: ICD-10-CM

## 2024-11-07 DIAGNOSIS — Z12.5 ENCOUNTER FOR SCREENING FOR MALIGNANT NEOPLASM OF PROSTATE: ICD-10-CM

## 2024-11-07 DIAGNOSIS — Z00.00 ENCOUNTER FOR GENERAL ADULT MEDICAL EXAMINATION WITHOUT ABNORMAL FINDINGS: ICD-10-CM

## 2024-11-07 PROCEDURE — 83540 ASSAY OF IRON: CPT | Performed by: CLINICAL MEDICAL LABORATORY

## 2024-11-07 PROCEDURE — 80061 LIPID PANEL: CPT | Performed by: CLINICAL MEDICAL LABORATORY

## 2024-11-07 PROCEDURE — 83550 IRON BINDING TEST: CPT | Performed by: CLINICAL MEDICAL LABORATORY

## 2024-11-07 PROCEDURE — 80050 GENERAL HEALTH PANEL: CPT | Performed by: CLINICAL MEDICAL LABORATORY

## 2024-11-08 LAB
ALBUMIN SERPL-MCNC: 3.8 G/DL (ref 3.4–5)
ALBUMIN/GLOB SERPL: 1.4 {RATIO} (ref 1–2.4)
ALP SERPL-CCNC: 78 UNITS/L (ref 45–117)
ALT SERPL-CCNC: 46 UNITS/L
ANION GAP SERPL CALC-SCNC: 11 MMOL/L (ref 7–19)
AST SERPL-CCNC: 21 UNITS/L
BASOPHILS # BLD: 0.1 K/MCL (ref 0–0.3)
BASOPHILS NFR BLD: 1 %
BILIRUB SERPL-MCNC: 0.6 MG/DL (ref 0.2–1)
BUN SERPL-MCNC: 17 MG/DL (ref 6–20)
BUN/CREAT SERPL: 20 (ref 7–25)
CALCIUM SERPL-MCNC: 9.2 MG/DL (ref 8.4–10.2)
CHLORIDE SERPL-SCNC: 108 MMOL/L (ref 97–110)
CHOLEST SERPL-MCNC: 178 MG/DL
CHOLEST/HDLC SERPL: 5.1 {RATIO}
CO2 SERPL-SCNC: 26 MMOL/L (ref 21–32)
CREAT SERPL-MCNC: 0.86 MG/DL (ref 0.67–1.17)
DEPRECATED RDW RBC: 48.1 FL (ref 39–50)
EGFRCR SERPLBLD CKD-EPI 2021: >90 ML/MIN/{1.73_M2}
EOSINOPHIL # BLD: 0.3 K/MCL (ref 0–0.5)
EOSINOPHIL NFR BLD: 4 %
ERYTHROCYTE [DISTWIDTH] IN BLOOD: 13.3 % (ref 11–15)
FASTING DURATION TIME PATIENT: 12 HOURS (ref 0–999)
GLOBULIN SER-MCNC: 2.8 G/DL (ref 2–4)
GLUCOSE SERPL-MCNC: 81 MG/DL (ref 70–99)
HCT VFR BLD CALC: 43.5 % (ref 39–51)
HDLC SERPL-MCNC: 35 MG/DL
HGB BLD-MCNC: 13.6 G/DL (ref 13–17)
IMM GRANULOCYTES # BLD AUTO: 0.1 K/MCL (ref 0–0.2)
IMM GRANULOCYTES # BLD: 1 %
IRON SATN MFR SERPL: 32 % (ref 15–45)
IRON SERPL-MCNC: 99 MCG/DL (ref 65–175)
LDLC SERPL CALC-MCNC: 115 MG/DL
LYMPHOCYTES # BLD: 1.8 K/MCL (ref 1–4)
LYMPHOCYTES NFR BLD: 21 %
MCH RBC QN AUTO: 30.6 PG (ref 26–34)
MCHC RBC AUTO-ENTMCNC: 31.3 G/DL (ref 32–36.5)
MCV RBC AUTO: 98 FL (ref 78–100)
MONOCYTES # BLD: 0.7 K/MCL (ref 0.3–0.9)
MONOCYTES NFR BLD: 8 %
NEUTROPHILS # BLD: 5.7 K/MCL (ref 1.8–7.7)
NEUTROPHILS NFR BLD: 65 %
NONHDLC SERPL-MCNC: 143 MG/DL
NRBC BLD MANUAL-RTO: 0 /100 WBC
PLATELET # BLD AUTO: 284 K/MCL (ref 140–450)
POTASSIUM SERPL-SCNC: 4.3 MMOL/L (ref 3.4–5.1)
PROT SERPL-MCNC: 6.6 G/DL (ref 6.4–8.2)
RBC # BLD: 4.44 MIL/MCL (ref 4.5–5.9)
SODIUM SERPL-SCNC: 141 MMOL/L (ref 135–145)
TIBC SERPL-MCNC: 308 MCG/DL (ref 250–450)
TRIGL SERPL-MCNC: 141 MG/DL
TSH SERPL-ACNC: 3.02 MCUNITS/ML (ref 0.35–5)
WBC # BLD: 8.5 K/MCL (ref 4.2–11)

## 2025-02-13 NOTE — WOUND PROGRESS NOTE
WOUND CARE NOTE      PLAN   Recommendations:  Dr. Donny Rene currently on consult  Dietary consult for recommendations for nutrition to optimize wound healing  Heels elevated using pillows, heel wedge or heel boots to offload heels    Wound(s)  Location: Ostomy:  Type: COLOSTOMY  Location of Stoma: LUQ  Stoma Appearance: 2 PC  Peristomal skin: C/D/I  Appliance: C/D/I    Allergies: Patient has no known allergies.     Labs:   Lab Results   Component Value Date    WBC 12.9 (H) 09/02/2019    HGB 10.4 (L) 09 Negative

## 2025-03-06 ENCOUNTER — CLINICAL ABSTRACT (OUTPATIENT)
Age: 55
End: 2025-03-06

## 2025-03-06 VITALS — SYSTOLIC BLOOD PRESSURE: 128 MMHG | DIASTOLIC BLOOD PRESSURE: 64 MMHG

## 2025-06-02 ENCOUNTER — LAB REQUISITION (OUTPATIENT)
Dept: LAB | Age: 55
End: 2025-06-02

## 2025-06-02 DIAGNOSIS — E78.5 HYPERLIPIDEMIA, UNSPECIFIED: ICD-10-CM

## 2025-06-02 DIAGNOSIS — E66.01 MORBID (SEVERE) OBESITY DUE TO EXCESS CALORIES  (CMD): ICD-10-CM

## 2025-06-03 ENCOUNTER — LAB SERVICES (OUTPATIENT)
Dept: LAB | Age: 55
End: 2025-06-03

## 2025-06-03 DIAGNOSIS — R06.81 WITNESSED APNEIC SPELLS: ICD-10-CM

## 2025-06-03 DIAGNOSIS — R53.83 FATIGUE: ICD-10-CM

## 2025-06-03 DIAGNOSIS — I10 HYPERTENSION: ICD-10-CM

## 2025-06-03 DIAGNOSIS — G47.33 OSA (OBSTRUCTIVE SLEEP APNEA): Primary | ICD-10-CM

## 2025-06-03 DIAGNOSIS — E66.9 OBESITY: ICD-10-CM

## 2025-06-03 DIAGNOSIS — G47.19 EXCESSIVE DAYTIME SLEEPINESS: ICD-10-CM

## 2025-06-03 DIAGNOSIS — R06.83 SNORING: ICD-10-CM

## 2025-06-03 PROCEDURE — PSEU8266 GLYCOHEMOGLOBIN: Performed by: CLINICAL MEDICAL LABORATORY

## 2025-06-03 PROCEDURE — 36415 COLL VENOUS BLD VENIPUNCTURE: CPT | Performed by: CLINICAL MEDICAL LABORATORY

## 2025-06-03 PROCEDURE — 80061 LIPID PANEL: CPT | Performed by: CLINICAL MEDICAL LABORATORY

## 2025-06-03 PROCEDURE — PSEU8250 COMPREHENSIVE METABOLIC PANEL: Performed by: CLINICAL MEDICAL LABORATORY

## 2025-06-03 PROCEDURE — 80053 COMPREHEN METABOLIC PANEL: CPT | Performed by: CLINICAL MEDICAL LABORATORY

## 2025-06-03 PROCEDURE — 83036 HEMOGLOBIN GLYCOSYLATED A1C: CPT | Performed by: CLINICAL MEDICAL LABORATORY

## 2025-06-03 PROCEDURE — PSEU8135 LIPID PANEL WITH REFLEX: Performed by: CLINICAL MEDICAL LABORATORY

## 2025-06-04 LAB
ALBUMIN SERPL-MCNC: 3.9 G/DL (ref 3.4–5)
ALBUMIN/GLOB SERPL: 1.5 {RATIO} (ref 1–2.4)
ALP SERPL-CCNC: 70 UNITS/L (ref 45–117)
ALT SERPL-CCNC: 53 UNITS/L
ANION GAP SERPL CALC-SCNC: 9 MMOL/L (ref 7–19)
AST SERPL-CCNC: 27 UNITS/L
BILIRUB SERPL-MCNC: 0.6 MG/DL (ref 0.2–1)
BUN SERPL-MCNC: 23 MG/DL (ref 6–20)
BUN/CREAT SERPL: 26 (ref 7–25)
CALCIUM SERPL-MCNC: 9.4 MG/DL (ref 8.4–10.2)
CHLORIDE SERPL-SCNC: 106 MMOL/L (ref 97–110)
CHOLEST SERPL-MCNC: 185 MG/DL
CHOLEST/HDLC SERPL: 4.7 {RATIO}
CO2 SERPL-SCNC: 28 MMOL/L (ref 21–32)
CREAT SERPL-MCNC: 0.87 MG/DL (ref 0.67–1.17)
EGFRCR SERPLBLD CKD-EPI 2021: >90 ML/MIN/{1.73_M2}
FASTING DURATION TIME PATIENT: 12 HOURS (ref 0–999)
GLOBULIN SER-MCNC: 2.6 G/DL (ref 2–4)
GLUCOSE SERPL-MCNC: 93 MG/DL (ref 70–99)
HBA1C MFR BLD: 5.6 % (ref 4.5–5.6)
HDLC SERPL-MCNC: 39 MG/DL
LDLC SERPL CALC-MCNC: 104 MG/DL
NONHDLC SERPL-MCNC: 146 MG/DL
POTASSIUM SERPL-SCNC: 4.8 MMOL/L (ref 3.4–5.1)
PROT SERPL-MCNC: 6.5 G/DL (ref 6.4–8.2)
SODIUM SERPL-SCNC: 138 MMOL/L (ref 135–145)
TRIGL SERPL-MCNC: 208 MG/DL

## 2025-06-16 ENCOUNTER — TELEPHONE (OUTPATIENT)
Dept: SLEEP MEDICINE | Age: 55
End: 2025-06-16

## 2025-06-17 ENCOUNTER — OFFICE VISIT (OUTPATIENT)
Dept: SLEEP MEDICINE | Age: 55
End: 2025-06-17
Attending: FAMILY MEDICINE

## 2025-06-17 VITALS
HEART RATE: 57 BPM | BODY MASS INDEX: 42.95 KG/M2 | DIASTOLIC BLOOD PRESSURE: 74 MMHG | TEMPERATURE: 97.8 F | OXYGEN SATURATION: 97 % | WEIGHT: 300 LBS | SYSTOLIC BLOOD PRESSURE: 127 MMHG | HEIGHT: 70 IN

## 2025-06-17 DIAGNOSIS — G47.33 OSA (OBSTRUCTIVE SLEEP APNEA): Primary | ICD-10-CM

## 2025-06-17 PROBLEM — I10 ESSENTIAL HYPERTENSION: Chronic | Status: ACTIVE | Noted: 2019-12-19

## 2025-06-17 PROCEDURE — 99203 OFFICE O/P NEW LOW 30 MIN: CPT | Performed by: NURSE PRACTITIONER

## 2025-06-17 PROCEDURE — 99211 OFF/OP EST MAY X REQ PHY/QHP: CPT

## 2025-06-17 PROCEDURE — 3078F DIAST BP <80 MM HG: CPT | Performed by: NURSE PRACTITIONER

## 2025-06-17 PROCEDURE — 3074F SYST BP LT 130 MM HG: CPT | Performed by: NURSE PRACTITIONER

## 2025-06-17 ASSESSMENT — SLEEP AND FATIGUE QUESTIONNAIRES
HOW LIKELY ARE YOU TO NOD OFF OR FALL ASLEEP IN A CAR, WHILE STOPPED FOR A FEW MINUTES IN TRAFFIC: WOULD NEVER DOZE
HOW LIKELY ARE YOU TO NOD OFF OR FALL ASLEEP WHILE SITTING AND READING: SLIGHT CHANCE OF DOZING
HOW LIKELY ARE YOU TO NOD OFF OR FALL ASLEEP WHEN YOU ARE A PASSENGER IN A CAR FOR AN HOUR WITHOUT A BREAK: WOULD NEVER DOZE
HOW LIKELY ARE YOU TO NOD OFF OR FALL ASLEEP WHILE SITTING INACTIVE IN A PUBLIC PLACE: SLIGHT CHANCE OF DOZING
HOW LIKELY ARE YOU TO NOD OFF OR FALL ASLEEP WHILE LYING DOWN TO REST IN THE AFTERNOON WHEN CIRCUMSTANCES PERMIT: WOULD NEVER DOZE
ESS TOTAL SCORE: 3
HOW LIKELY ARE YOU TO NOD OFF OR FALL ASLEEP WHILE SITTING QUIETLY AFTER LUNCH WITHOUT ALCOHOL: WOULD NEVER DOZE
HOW LIKELY ARE YOU TO NOD OFF OR FALL ASLEEP WHILE SITTING AND TALKING TO SOMEONE: WOULD NEVER DOZE
HOW LIKELY ARE YOU TO NOD OFF OR FALL ASLEEP WHILE WATCHING TV: SLIGHT CHANCE OF DOZING

## 2025-06-17 ASSESSMENT — ENCOUNTER SYMPTOMS
EXCESSIVE DAYTIME SLEEPINESS: 1
SLEEP DISTURBANCES DUE TO BREATHING: 1
FEVER: 0
SNORING: 1
CHILLS: 0
PARESTHESIAS: 0
SORE THROAT: 0
WEIGHT LOSS: 0
NUMBNESS: 0

## 2025-07-03 ENCOUNTER — HOSPITAL ENCOUNTER (OUTPATIENT)
Dept: GENERAL RADIOLOGY | Age: 55
Discharge: HOME | End: 2025-07-03
Attending: FAMILY MEDICINE
Payer: COMMERCIAL

## 2025-07-03 DIAGNOSIS — M54.41 ACUTE BACK PAIN WITH SCIATICA, RIGHT: ICD-10-CM

## 2025-07-03 PROCEDURE — 72110 X-RAY EXAM L-2 SPINE 4/>VWS: CPT | Performed by: FAMILY MEDICINE

## 2025-07-08 ENCOUNTER — OFFICE VISIT (OUTPATIENT)
Dept: PHYSICAL THERAPY | Age: 55
End: 2025-07-08
Payer: COMMERCIAL

## 2025-07-08 ENCOUNTER — ORDER TRANSCRIPTION (OUTPATIENT)
Dept: PHYSICAL THERAPY | Facility: HOSPITAL | Age: 55
End: 2025-07-08

## 2025-07-08 ENCOUNTER — TELEPHONE (OUTPATIENT)
Dept: PHYSICAL THERAPY | Facility: HOSPITAL | Age: 55
End: 2025-07-08

## 2025-07-08 DIAGNOSIS — M54.16 LUMBAR RADICULOPATHY: Primary | ICD-10-CM

## 2025-07-08 PROCEDURE — 97162 PT EVAL MOD COMPLEX 30 MIN: CPT

## 2025-07-08 PROCEDURE — 97110 THERAPEUTIC EXERCISES: CPT

## 2025-07-08 PROCEDURE — 97530 THERAPEUTIC ACTIVITIES: CPT

## 2025-07-08 NOTE — PROGRESS NOTES
SPINE EVALUATION:     Diagnosis:   Lumbar radiculopathy (M54.16) Patient:  Guero Vann (55 year old, male)        Referring Provider: Physician Lunataff  Today's Date   7/8/2025    Precautions:  None   Date of Evaluation: 07/08/25  Next MD visit: -  Date of Surgery: na     PATIENT SUMMARY     Summary of chief complaints: 3 week history of LBP and R LE pain  History of current condition: Pt describes that a few weeks ago (6/22/2025) while pulling a camping chair from the car trunk he felt mild LBP.  The pain persisted and then he later began to have R LE pain.  He was prescribed anti-inflammatory and muscle relaxants for 10 days which helped some, but symptoms have persisted.  Now he feels uncomfortable all of the time, has to frequently change positions (q 10').   Pain level: current 4 /10, at best 0 /10, at worst 8 /10  Description of symptoms: midline LBP, R post LE pain   Occupation: IT (sits primarily at computer)   Leisure activities/Hobbies: camping   Prior level of function: without limitation, daily dog walks (20')  Current limitations: limited sleep, limited upright activity tolerance, limited gait tolerance  Pt goals: elimination of pain  Red flag signs/symptoms: Pt denies dizziness, drop attacks, dysphagia, dysarthria, diplopia; Pt denies changes in bowel/bladder function, saddle anesthesia; Pt denies pain that wakes in sleep, fever, recent trauma, history of CA, pain unchanged with movement/activity    Past medical history was reviewed with Guero.  Significant findings include: LUIS DANIEL w/ CPAP, obese, history of GI blockage/colostomy/reversal (6 yrs ago); repair of multiple abdominal hernias 2023  Imaging/Tests: xray   Guero  has a past medical history of Diverticulosis of large intestine, Essential hypertension, High cholesterol, Hyperlipidemia, Left shoulder pain, Sleep apnea, and Visual impairment.  He  has a past surgical history that includes appendectomy (1998); colonoscopy (N/A,  12/5/2019); and other (09/2019).    ASSESSMENT  Guero presents to physical therapy evaluation with primary c/o 3 week history of LBP and R LE pain. The results of the objective tests and measures show tight hamstring, weak hip/abs. Functional deficits include but are not limited to limited sleep, limited upright activity tolerance, limited gait tolerance. Signs and symptoms are consistent with diagnosis of Lumbar radiculopathy (M54.16). Pt and PT discussed evaluation findings, pathology, POC and HEP.  Pt voiced understanding and performs HEP correctly without reported pain. Skilled Physical Therapy is medically necessary to address the above impairments and reach functional goals.    OBJECTIVE:      Musculoskeletal:  Observation/Posture: forward head posture; rounded shoulders   Accessory Motion: WNL's and painfree PA's lumbar   Palpation: No TTP lumbar spinous processes or lumbar paraspinals, nor buttocks     Special tests:   Negative YOHAN and FADIR B.     ROM and Strength:  (* denotes performed with pain)  Trunk ROM MMT (-/5)     Flex WNL's       Ext WNL's      R L R L     Side bend WNL's WNL's         Rotation WNL's WNL's       ,   Hip   ROM MMT (-/5)    R L R L     Flex (L2) WNL's WNL's 5 5     Ext  WNL's WNL's 4- 4-     Abd WNL's WNL's 4 4+     ER WNL's WNL's         IR WNL's WNL's        ,   Knee   ROM MMT (-/5)    R L R L     Flex WNL's WNL's 5 5     Ext (L3) WNL's WNL's 5 5     ,   Ankle/Foot   ROM MMT (-/5)    R L R L     PF WNL's WNL's 5 5     DF (L4) WNL's WNL's 5 5     Inversion             Eversion             Grt Toe Ext (L5)     4+ 4+       Flexibility:  LE Flexibility R L     Hip Flexor WNL WNL     Hamstrings mod restricted mod restricted     ITB         Piriformis min restricted min restricted     Quads WNL WNL     Gastroc-soleus           Neurological:  Sensation: grossly intact to light touch BRAINNA UE/LE       Balance and Functional Mobility:  Gait: pt ambulates on level ground with normal  mechanics.       Today's Treatment and Response:   Pt education was provided on exam findings, treatment diagnosis, treatment plan, expectations, and prognosis.    Today's Treatment       7/8/2025   Spine Treatment   Therapeutic Exercise - Supine HS stretch  - Supine piriformis stretch  - PPU's (to painfree limit)  - standing back ext (to painfree limit)  - Abdominal brace/PPT w/ LE march   Therapeutic Activity - Discussed lifting/body mechanics  - Discussed work station ergonomic setup  - Discussed frequent (q 20') change of position   Therapeutic Exercise Minutes 15   Therapeutic Activity Minutes 20   Evaluation Minutes 20   Total Time Of Timed Procedures 35   Total Time Of Service-Based Procedures 20   Total Treatment Time 55        Patient was instructed in and issued a HEP for:      Charges:  PT EVAL: Moderate Complexity, Ther Ex, Ther Act  In agreement with evaluation findings and clinical rationale, this evaluation involved MODERATE COMPLEXITY decision making due to 1-2 personal factors/comorbidities, 3 or more body structures involved/activity limitations, and evolving symptoms as documented in the evaluation.                                                                         PLAN OF CARE:      Goals: (to be met in 8 visits)   Pt report min/no pain  Pt demonstrate appropriate lifting/body mechanics  Pt report no functional limitations  Pt demonstrate independent HEP     Frequency / Duration: Patient will be seen 1-2x/weekx/week or a total of 8  visits over a 90 day period. Treatment will include: Manual Therapy; Neuromuscular Re-education; Therapeutic Activities; Therapeutic Exercise; Home Exercise Program instruction; Patient/Family Education    Education or treatment limitation: None   Rehab Potential: excellent     Oswestry Disability Index Score  Score: (Patient-Rptd) 62 % (7/8/2025  1:18 PM)      Patient/Family/Caregiver was advised of these findings, precautions, and treatment options and has  agreed to actively participate in planning and for this course of care.    Thank you for your referral. Please co-sign or sign and return this letter via fax as soon as possible to [phone]. If you have any questions, please contact me at Dept: [phone]    Sincerely,  Electronically signed by therapist: Jayson Cerna PT  Physician's certification required: Yes  I certify the need for these services furnished under this plan of treatment and while under my care.    X___________________________________________________ Date____________________    Certification From: 7/8/2025  To: 10/6/2025

## 2025-07-10 ENCOUNTER — OFFICE VISIT (OUTPATIENT)
Dept: PHYSICAL THERAPY | Age: 55
End: 2025-07-10
Payer: COMMERCIAL

## 2025-07-10 PROCEDURE — 97110 THERAPEUTIC EXERCISES: CPT

## 2025-07-10 NOTE — PROGRESS NOTES
Patient: Guero Vann (55 year old, male) Referring Provider:  Insurance:   Diagnosis: Lumbar radiculopathy (M54.16) Physician Nonstaeliezer [employer] [place] PPO   Date of Surgery: na Next MD visit:  N/A   Precautions:  None - Referral Information:    Date of Evaluation: Req: 0, Auth: 0, Exp:     07/08/25 POC Auth Visits:  8       Today's Date   7/10/2025    Subjective  A lot better already.  Also saw a pain medicine specialist, received a brace which feels good, and had an MRI (results not yet known).  Doing HEP and getting a freq change of position at work.       Pain: 0/10     Objective  See below treatment grid.          Assessment  Good initial results.  Expect DOMS in thighs from squats/lunges today.    Goals (to be met in 8 visits)   Pt report min/no pain  Pt demonstrate appropriate lifting/body mechanics  Pt report no functional limitations  Pt demonstrate independent HEP       Plan  Progress core/hip strengthening and body mechanics as able.    Treatment Last 4 Visits  Treatment Day: 2       7/8/2025 7/10/2025   Spine Treatment   Therapeutic Exercise - Supine HS stretch  - Supine piriformis stretch  - PPU's (to painfree limit)  - standing back ext (to painfree limit)  - Abdominal brace/PPT w/ LE march - NuStep L5 x 6'  - Supine HS stretch  - Supine piriformis stretch  - PPU's (to painfree limit)  - standing back ext (to painfree limit)  - Abdominal brace/PPT w/ LE march; ab brace w/ 90-90 alt heel tap  - squats x30  - fwd lunges 10x ea R, L       Therapeutic Activity - Discussed lifting/body mechanics  - Discussed work station ergonomic setup  - Discussed frequent (q 20') change of position    Therapeutic Exercise Minutes 15 45   Therapeutic Activity Minutes 20    Evaluation Minutes 20    Total Time Of Timed Procedures 35 45   Total Time Of Service-Based Procedures 20 0   Total Treatment Time 55 45        HEP       Charges  Ther Ex x3

## 2025-07-11 ENCOUNTER — OFFICE VISIT (OUTPATIENT)
Dept: SLEEP MEDICINE | Age: 55
End: 2025-07-11

## 2025-07-11 DIAGNOSIS — G47.33 OSA (OBSTRUCTIVE SLEEP APNEA): ICD-10-CM

## 2025-07-11 PROCEDURE — 95800 SLP STDY UNATTENDED: CPT

## 2025-07-16 ENCOUNTER — OFFICE VISIT (OUTPATIENT)
Dept: PHYSICAL THERAPY | Age: 55
End: 2025-07-16
Payer: COMMERCIAL

## 2025-07-16 PROCEDURE — 97110 THERAPEUTIC EXERCISES: CPT | Performed by: PHYSICAL THERAPIST

## 2025-07-16 NOTE — PROGRESS NOTES
Patient: Guero Vann (55 year old, male) Referring Provider:  Insurance:   Diagnosis: Lumbar radiculopathy (M54.16) Physician Nonstaff  [employer] [place] PPO   Date of Surgery: na Next MD visit:  N/A   Precautions:  None - Referral Information:    Date of Evaluation: Req: 0, Auth: 0, Exp:     07/08/25 POC Auth Visits:  9       Today's Date   7/16/2025    Subjective  Patient reports some bulging discs noted lower back R > L.  No surgery.  Going to have an epidural 7/30.  States leg pain only with walking/standing, reduced with sitting but not gone.       Pain: 4/10     Objective  see outpatient daily record.          Assessment   Patient responding to prone extension - patient to use prone extension + sags every 2 hours,  lumbar support, position change every 15 minutes. Hold other exercises- reassess next session.  Stop any that cause more pain into leg.      Goals (to be met in 8 visits)   Pt report min/no pain  Pt demonstrate appropriate lifting/body mechanics  Pt report no functional limitations  Pt demonstrate independent HEP     Plan   Reassess next session.     Treatment Last 4 Visits  Treatment Day: 3       7/8/2025 7/10/2025 7/16/2025   Spine Treatment   Therapeutic Exercise - Supine HS stretch  - Supine piriformis stretch  - PPU's (to painfree limit)  - standing back ext (to painfree limit)  - Abdominal brace/PPT w/ LE march - NuStep L5 x 6'  - Supine HS stretch  - Supine piriformis stretch  - PPU's (to painfree limit)  - standing back ext (to painfree limit)  - Abdominal brace/PPT w/ LE march; ab brace w/ 90-90 alt heel tap  - squats x30  - fwd lunges 10x ea R, L     Nu step 6 min level 5  Standing baseline:  buttock to mid calf  Repeated flexion in standing  NE   Repeated extension in standing over fulcrum- NE  Prone lying - D/B through leg.  KATE - NE   Prone press ups - NE  Prone press ups + sags - abolish leg, D/B buttock- NE   Therapist OP with press ups - NE  Jt mobs L3/4/5 - D/B buttock  Prone hip  extension x 10 each side- NE  Standing - walking 250 feet - No return of pain  Review of use of lumbar support sitting  Walking 250 feet - no return of pain.   Therapeutic Activity - Discussed lifting/body mechanics  - Discussed work station ergonomic setup  - Discussed frequent (q 20') change of position     Therapeutic Exercise Minutes 15 45    Therapeutic Activity Minutes 20     Evaluation Minutes 20     Total Time Of Timed Procedures 35 45 0   Total Time Of Service-Based Procedures 20 0 0   Total Treatment Time 55 45 0   HEP   Prone press ups + sags- every 2 hours at minimum  Use of lumbar support sitting  Position change 15 minutes          HEP  Prone press ups + sags- every 2 hours at minimum  Use of lumbar support sitting  Position change 15 minutes      Charges   Ex 3

## 2025-07-18 ENCOUNTER — OFFICE VISIT (OUTPATIENT)
Dept: PHYSICAL THERAPY | Age: 55
End: 2025-07-18
Payer: COMMERCIAL

## 2025-07-18 PROCEDURE — 97110 THERAPEUTIC EXERCISES: CPT | Performed by: PHYSICAL THERAPIST

## 2025-07-18 NOTE — PROGRESS NOTES
Patient: Guero Vann (55 year old, male) Referring Provider:  Insurance:   Diagnosis: Lumbar radiculopathy (M54.16) Physician Nonstaff  [employer] [place]   Date of Surgery: na Next MD visit:  N/A   Precautions:  None - Referral Information:    Date of Evaluation: Req: 0, Auth: 0, Exp:     07/08/25 POC Auth Visits:  9       Today's Date   7/18/2025    Subjective   Patient reports the pain is moving higher in the leg and is overall improved.  After exercises has no pain, walking after a bit or standing will bring back to leg.       Pain: Baseline:  buttock, upper thigh     Objective  see outpatient daily record.          Assessment   Directional preference continues to be extension.  Attempted off center prone and lateral in standing with no affect.  Patient will continue with prone extension or standing extension as both improved symptoms.  Main will be prone extension, if unable to do will do in standing.  Overall symptoms are centralizing.     Goals (to be met in 8 visits)   Pt report min/no pain  Pt demonstrate appropriate lifting/body mechanics  Pt report no functional limitations  Pt demonstrate independent HEP       Plan     Reassess next session.  Treatment Last 4 Visits  Treatment Day: 4       7/8/2025 7/10/2025 7/16/2025 7/18/2025   Spine Treatment   Therapeutic Exercise - Supine HS stretch  - Supine piriformis stretch  - PPU's (to painfree limit)  - standing back ext (to painfree limit)  - Abdominal brace/PPT w/ LE march - NuStep L5 x 6'  - Supine HS stretch  - Supine piriformis stretch  - PPU's (to painfree limit)  - standing back ext (to painfree limit)  - Abdominal brace/PPT w/ LE march; ab brace w/ 90-90 alt heel tap  - squats x30  - fwd lunges 10x ea R, L     Nu step 6 min level 5  Standing baseline:  buttock to mid calf  Repeated flexion in standing  NE   Repeated extension in standing over fulcrum- NE  Prone lying - D/B through leg.  KATE - NE   Prone press ups - NE  Prone press ups + sags -  abolish leg, D/B buttock- NE   Therapist OP with press ups - NE  Jt mobs L3/4/5 - D/B buttock  Prone hip extension x 10 each side- NE  Standing - walking 250 feet - No return of pain  Review of use of lumbar support sitting  Walking 250 feet - no return of pain.   Standing baseline:  buttock to just below buttock  KATE - NE  Prone press ups + sags- abolish symptoms thigh, D/B buttock  Prone press ups with more into extension on books, + 3 sags.  Abolish symptoms  Walking 250 feet - slight symptoms buttock.    Looked at SG to L in standing - NE symptoms.  Standing back extension - abolish symptoms.   Prone hip extension x 10 each- NE       Therapeutic Activity - Discussed lifting/body mechanics  - Discussed work station ergonomic setup  - Discussed frequent (q 20') change of position      Therapeutic Exercise Minutes 15 45  40   Therapeutic Activity Minutes 20      Evaluation Minutes 20      Total Time Of Timed Procedures 35 45 0 40   Total Time Of Service-Based Procedures 20 0 0 0   Total Treatment Time 55 45 0 40   HEP   Prone press ups + sags- every 2 hours at minimum  Use of lumbar support sitting  Position change 15 minutes           HEP  Prone press ups + sags- every 2 hours at minimum  Use of lumbar support sitting  Position change 15 minutes  May also use standing extensions if unable to go into prone.    Charges   Ex 3

## 2025-07-22 ENCOUNTER — OFFICE VISIT (OUTPATIENT)
Dept: PHYSICAL THERAPY | Age: 55
End: 2025-07-22
Payer: COMMERCIAL

## 2025-07-22 PROCEDURE — 97110 THERAPEUTIC EXERCISES: CPT

## 2025-07-23 ENCOUNTER — RESULTS FOLLOW-UP (OUTPATIENT)
Dept: SLEEP MEDICINE | Age: 55
End: 2025-07-23

## 2025-07-23 DIAGNOSIS — G47.33 OSA (OBSTRUCTIVE SLEEP APNEA): Primary | ICD-10-CM

## 2025-07-23 NOTE — PROGRESS NOTES
Patient: Guero Vann (55 year old, male) Referring Provider:  Insurance:   Diagnosis: Lumbar radiculopathy (M54.16) Physician Nonstaeliezer  [employer] [place]PO   Date of Surgery: na Next MD visit:  N/A   Precautions:  None - Referral Information:    Date of Evaluation: Req: 0, Auth: 0, Exp:     07/08/25 POC Auth Visits:  9       Today's Date   7/22/2025    Subjective  Pt reports symptoms had been responding well to extension, but today has had pain with sitting, standing and lying down.  Unsure why, possibly lifting yesterday.       Pain: 0/10     Objective  see outpatient daily record.          Assessment  Able to achieve pain relief with PPU's.  Encouraged pt to use PPU's, piriformis stretch and LE flossing hourly during his remote work day.    Goals (to be met in 8 visits)   Pt report min/no pain  Pt demonstrate appropriate lifting/body mechanics  Pt report no functional limitations  Pt demonstrate independent HEP         Plan  Progress core/hip strengthening and body mechanics as able.    Treatment Last 4 Visits  Treatment Day: 5       7/10/2025 7/16/2025 7/18/2025 7/22/2025   Spine Treatment   Therapeutic Exercise - NuStep L5 x 6'  - Supine HS stretch  - Supine piriformis stretch  - PPU's (to painfree limit)  - standing back ext (to painfree limit)  - Abdominal brace/PPT w/ LE march; ab brace w/ 90-90 alt heel tap  - squats x30  - fwd lunges 10x ea R, L     Nu step 6 min level 5  Standing baseline:  buttock to mid calf  Repeated flexion in standing  NE   Repeated extension in standing over fulcrum- NE  Prone lying - D/B through leg.  KATE - NE   Prone press ups - NE  Prone press ups + sags - abolish leg, D/B buttock- NE   Therapist OP with press ups - NE  Jt mobs L3/4/5 - D/B buttock  Prone hip extension x 10 each side- NE  Standing - walking 250 feet - No return of pain  Review of use of lumbar support sitting  Walking 250 feet - no return of pain.   Standing baseline:  buttock to just below buttock  KATE -  NE  Prone press ups + sags- abolish symptoms thigh, D/B buttock  Prone press ups with more into extension on books, + 3 sags.  Abolish symptoms  Walking 250 feet - slight symptoms buttock.    Looked at SG to L in standing - NE symptoms.  Prone hip extension x 10 each     - PPU's w/ sag 3x10 resting in KATE between sets     - pt to use standing ext if unable to perform prone  - Abdominal bracing w/ LE 90-90 alt tap  - piriformis stretch  - LE neural flossing  - pt has inversion table and will attempt use of it for relief as well.   Therapeutic Exercise Minutes 45  40 45   Total Time Of Timed Procedures 45 0 40 45   Total Time Of Service-Based Procedures 0 0 0 0   Total Treatment Time 45 0 40 45   HEP  Prone press ups + sags- every 2 hours at minimum  Use of lumbar support sitting  Position change 15 minutes            HEP  Prone press ups + sags- every 2 hours at minimum  Use of lumbar support sitting  Position change 15 minutes      Charges  Ther Ex x3

## 2025-07-30 ENCOUNTER — OFFICE VISIT (OUTPATIENT)
Dept: PHYSICAL THERAPY | Age: 55
End: 2025-07-30
Payer: COMMERCIAL

## 2025-07-30 PROCEDURE — 97110 THERAPEUTIC EXERCISES: CPT

## 2025-08-05 DIAGNOSIS — G47.33 OSA (OBSTRUCTIVE SLEEP APNEA): Primary | ICD-10-CM

## 2025-08-29 ENCOUNTER — APPOINTMENT (OUTPATIENT)
Dept: SLEEP MEDICINE | Age: 55
End: 2025-08-29

## 2025-10-01 ENCOUNTER — APPOINTMENT (OUTPATIENT)
Dept: SLEEP MEDICINE | Age: 55
End: 2025-10-01

## (undated) DEVICE — GOWN SURG AERO BLUE PERF XLG

## (undated) DEVICE — SUTURE SILK 3-0 C017D

## (undated) DEVICE — SUTURE SILK 2-0 SH

## (undated) DEVICE — STERILE SURGICAL LUBRICANT, METAL TUBE: Brand: SURGILUBE

## (undated) DEVICE — SOLUTION IRRIG 1000ML 0.9% NACL USP BTL

## (undated) DEVICE — SUTURE SILK 2-0 SA65H

## (undated) DEVICE — COVER SGL STRL LGHT HNDL BLU

## (undated) DEVICE — SUTURE CHROMIC GUT SZ 3-0 L27IN ABSRB UD

## (undated) DEVICE — EVACUATOR SUR 100CC SIL BLB WND

## (undated) DEVICE — CATH RED RUBBER 14FR

## (undated) DEVICE — LIGASURE IMPACT OPEN DEVICE

## (undated) DEVICE — CONTOUR CURVED CUTTER STAPLER: Brand: CONTOUR

## (undated) DEVICE — ENDOSCOPIC VALVE WITH ADAPTER.: Brand: SURSEAL® II

## (undated) DEVICE — MINOR GENERAL: Brand: MEDLINE INDUSTRIES, INC.

## (undated) DEVICE — SUTURE VCRL SZ 2-0 L36IN ABSRB UD L36MM CT-1

## (undated) DEVICE — CLIP LG INTNL HMCLP TNTLM ESCP

## (undated) DEVICE — 3M™ IOBAN™ 2 ANTIMICROBIAL INCISE DRAPE 6651EZ: Brand: IOBAN™ 2

## (undated) DEVICE — SPONGE LAP W18XL18IN WHT COT 4 PLY FLD STRUNG

## (undated) DEVICE — SUTURE PLAIN GUT 3-0 CT-1

## (undated) DEVICE — PROXIMATE LINEAR CUTTER RELOAD, BLUE, 75MM: Brand: PROXIMATE

## (undated) DEVICE — PROXIMATE RELOADABLE LINEAR CUTTER WITH SAFETY LOCK-OUT, 75MM: Brand: PROXIMATE

## (undated) DEVICE — [URETERAL KIT: 2 - URETERAL CATHETERS, 6 FR OUTER DIAMETER, 70 CM LENGTH,  2 - EMITTING FIBER, 0.75 OUTER DIAMETER, 370 CM LENGTH,  DO NOT USE IF PACKAGE IS DAMAGED]: Brand: IRIS

## (undated) DEVICE — PACK SRG UNV 1 STRL LF

## (undated) DEVICE — SPONGE LAP 18X18 XRAY STRL

## (undated) DEVICE — SUTURE PROL SZ 1 L30IN NONABSORBABLE BLU

## (undated) DEVICE — PURSE STRING DEVICE: Brand: PURSTRING

## (undated) DEVICE — SUTURE PROL SZ 0 L30IN NONABSORBABLE BLU .

## (undated) DEVICE — PROXIMATE RH ROTATING HEAD SKIN STAPLERS (35 WIDE) CONTAINS 35 STAINLESS STEEL STAPLES: Brand: PROXIMATE

## (undated) DEVICE — LEGGINGS SRG 48X31IN CUF STRL

## (undated) DEVICE — DRAIN SILICONE FLAT 10MM

## (undated) DEVICE — SOL H2O 3000ML IRRIG

## (undated) DEVICE — DRAPE SHEET LG

## (undated) DEVICE — SIGMOIDOSCOPIC SUCTION INSTRUMENT 18 FR W/WINGED CAP CONTROL AND 6 FOOT (1.8M) TUBING: Brand: SIGMOIDOSCOPIC

## (undated) DEVICE — TUBING CYSTO TUR DUAL

## (undated) DEVICE — ADHESIVE MASTISOL 2/3CC VL

## (undated) DEVICE — SUT SLK 0 30IN MULTPK BK

## (undated) DEVICE — DRAPE,UNDRBUT,WHT GRAD PCH,CAPPORT,20/CS: Brand: MEDLINE

## (undated) DEVICE — DRAPE SHEET LAPAROTOMY

## (undated) DEVICE — SUTURE SILK 0 SH

## (undated) DEVICE — A P RESECTION: Brand: MEDLINE INDUSTRIES, INC.

## (undated) DEVICE — SUTURE PDS II 0 CTX

## (undated) DEVICE — ADHESIVE LIQ 2/3ML VI MASTISOL

## (undated) DEVICE — 3M™ IOBAN™ 2 ANTIMICROBIAL INCISE DRAPE 6650EZ: Brand: IOBAN™ 2

## (undated) DEVICE — SUTURE CHROMIC GUT 2-0 SH

## (undated) DEVICE — SOLO HYBRID WIRE 35 FLEX STR

## (undated) DEVICE — CIRCULAR MECH XL SEAL 33MM

## (undated) DEVICE — MEDI-VAC NON-CONDUCTIVE SUCTION TUBING: Brand: CARDINAL HEALTH

## (undated) DEVICE — SUTURE PERMA- SZ 2-0 L30IN NONABSORBABLE

## (undated) DEVICE — SOL  .9 1000ML BTL

## (undated) DEVICE — DRAIN RESERVOIR RELIAVAC 100CC

## (undated) DEVICE — DRAPE,UNDERBUTTOCKS,STERILE: Brand: MEDLINE

## (undated) DEVICE — BIOPATCH™ ANTIMICROBIAL DRESSING WITH CHLORHEXIDINE GLUCONATE IS A HYDROPHILLIC POLYURETHANE ABSORPTIVE FOAM WITH CHLORHEXIDINE GLUCONATE (CHG) WHICH INHIBITS BACTERIAL GROWTH UNDER THE DRESSING. THE DRESSING IS INTENDED TO BE USED TO ABSORB EXUDATE, COVER A WOUND CAUSED BY VASCULAR AND NONVASCULAR PERCUTANEOUS MEDICAL DEVICES DURING SURGERY, AS WELL AS REDUCE LOCAL INFECTION AND COLONIZATION OF MICROORGANISMS.: Brand: BIOPATCH

## (undated) DEVICE — NON-ADHERENT PAD PREPACK: Brand: TELFA

## (undated) DEVICE — CULTURE TUBE ANAEROBIC

## (undated) DEVICE — SUTURE SILK 2-0 FS

## (undated) DEVICE — PLASTC TOOMEY SYRNG DISP

## (undated) DEVICE — PICO 7 DUAL 20X20CM: Brand: PICO™ 7

## (undated) DEVICE — SUTURE PDS II SZ 0 L60IN ABSRB VLT L48MM CTX

## (undated) DEVICE — SUT SLK 2-0 18IN FS BK

## (undated) DEVICE — TRAY SKIN PREP PVP-1

## (undated) DEVICE — TOWEL OR BLU 16X26 STRL

## (undated) DEVICE — CYSTO PACK: Brand: MEDLINE INDUSTRIES, INC.

## (undated) DEVICE — DRAIN SUR W10MMXL20CM SIL FULL PERF HUBLESS

## (undated) DEVICE — POOLE SUCTION INSTRUMENT WITH REMOVABLE SHEATH: Brand: POOLE

## (undated) DEVICE — SOLO FLEX HYBRID GUIDEWIRE .03

## (undated) DEVICE — GAMMEX® PI HYBRID SIZE 7.5, STERILE POWDER-FREE SURGICAL GLOVE, POLYISOPRENE AND NEOPRENE BLEND: Brand: GAMMEX

## (undated) DEVICE — CULTURE COLLECT/TRANSPORT SYS

## (undated) DEVICE — GAMMEX® PI HYBRID SIZE 6.5, STERILE POWDER-FREE SURGICAL GLOVE, POLYISOPRENE AND NEOPRENE BLEND: Brand: GAMMEX

## (undated) DEVICE — POUCH 70MM OST MED 2 PC DRN

## (undated) DEVICE — SUTURE PDS II 1 CTX

## (undated) DEVICE — SOL  .9 3000ML

## (undated) DEVICE — GOWN SURG AERO BLUE PERF LG

## (undated) DEVICE — TIGERTAIL 6F FLXTIP 70CM

## (undated) DEVICE — SUTURE SILK 3-0 SA64H

## (undated) DEVICE — BENTSON PTFE WIRE GUIDE WITH 15CM FLEXIBLE TIP: Brand: BENTSON

## (undated) DEVICE — SUTURE CHROMIC GUT 3-0 SH

## (undated) DEVICE — SUTURE SILK 0

## (undated) DEVICE — CIRCULAR STAPLER WITH DST SERIES TECHNOLOGY: Brand: EEA

## (undated) DEVICE — DRAPE SRG UNV 131X90IN LWR

## (undated) DEVICE — COVER SLV UNV DISP NTR STRL LF

## (undated) DEVICE — PROXIMATE LINEAR CUTTER  RELOAD (THICK): Brand: PROXIMATE

## (undated) NOTE — LETTER
[address] [place] [address] Rd, [place], [place]     AUTHORIZATION FOR SURGICAL OPERATION OR PROCEDURE    I hereby authorize Dr. Adeline Saravia MD, my Physician(s) and whomever may be designated as the doctor's Assistant, to perform the foll 4. I consent to the photographing of procedure(s) to be performed for the purposes of advancing medicine, science and/or education, provided my identity is not revealed.  If the procedure has been videotaped, the physician/surgeon will obtain the original v (Witness signature)                                                                                                  (Date)                                (Time)  STATEMENT OF PHYSICIAN My signature below affirms that prior to the time of the procedure;  I

## (undated) NOTE — LETTER
Gianluca Prasad [address], [facility], [facility]  [address]  INFORMED CONSENT FOR TRANSFUSION OF BLOOD OR BLOOD PRODUCTS  My physician has informed me of the nature, purpose, benefits and risks of transfusion for blood and blood components that ______________________________________________  (Signature of Patient)                                                            (Responsible party in case of Minor,

## (undated) NOTE — LETTER
Hospital Discharge Documentation  Please phone to schedule a hospital follow up appointment.     From: [address] [facility]'s Office  Phone: [phone]    Patient discharged time/date: 12/25/2019 12:43 PM  Patient discharge disposition:  Home or Mary anesthesia, including myocardial infarction, respiratory failure, renal failure, pulmonary embolus, DVT, and even death were all discussed in detail with the patient as well as his wife; both understand, consent, and wish to proceed with the operation. CONTINUE taking these medications      Instructions Prescription details   aspirin 81 MG Tabs      Take 81 mg by mouth daily.    Refills:  0     erythromycin base 500 MG Tabs  Commonly known as:  E-MYCIN      Take 2 tabs PO at 3:00pm, 6:00pm and 9:00pm on t WW.1 - Sarah Shelley MD on 12/25/2019  3:15 PM

## (undated) NOTE — LETTER
Hospital Discharge Documentation  Please phone to schedule a hospital follow up appointment. No discharge summary available at this time, below is the most recent progress note  for your review .         From: [id number] [facility]'s Office  Phone: [phone] lb 3.2 oz (114.9 kg), SpO2 96 %.     Nursing note and vitals reviewed. Constitutional: He is oriented to person, place, and time and thin. He appears well-developed. No distress. HENT:   Head: Normocephalic and atraumatic.    Cardiovascular: Normal   CA 8.6 09/07/2019     ALB 2.4 (L) 09/03/2019     ALKPHO 35 (L) 09/03/2019     BILT 0.5 09/03/2019     TP 6.0 (L) 09/03/2019     AST 53 (H) 09/03/2019     ALT 37 09/03/2019     INR 1.25 (H) 08/31/2019     MG 2.1 09/07/2019     PHOS 3.8 09/07/2019

## (undated) NOTE — LETTER
[address], [place], [place]     AUTHORIZATION FOR SURGICAL OPERATION OR PROCEDURE    I hereby authorize Dr. Sixto Irizarry MD, my Physician(s) and whomever may be designated as the doctor's Assistant, to perform the 4. I consent to the photographing of procedure(s) to be performed for the purposes of advancing medicine, science and/or education, provided my identity is not revealed.  If the procedure has been videotaped, the physician/surgeon will obtain the original v (Witness signature)                                                                                                  (Date)                                (Time)  STATEMENT OF PHYSICIAN My signature below affirms that prior to the time of the procedure;  I

## (undated) NOTE — LETTER
Hospital Discharge Documentation  Please phone to schedule a hospital follow up appointment. From: [address] Hospitalist's Office  Phone: [phone]    Patient discharged time/date: 11/24/2023  1:59 PM  Patient discharge disposition:  Home or Self Care       Discharge Summary - D/C Summary        Discharge Summary signed by Silvia Hand MD at 11/27/2023  9:25 AM   Version 1 of 1      Author: Silvia Hand MD Service: Hospitalist Author Type: Physician    Filed: 11/27/2023  9:25 AM Status: Signed    : Silvia Hand MD (Physician)         [address] [facility] [id number]   YOB: 1970 [id number]         [facility]    PATIENT'S NAME: Montoya Polancomaricarmen MEJÍA   ATTENDING PHYSICIAN: Adrian Plaza MD   PATIENT ACCOUNT#:   [id number]    LOCATION:  [address] [place] [address] RECORD #:   [id number]       YOB: 1970  ADMISSION DATE:       11/16/2023      DISCHARGE DATE:  11/24/2023    DISCHARGE SUMMARY    About 35 minutes were spent preparing this discharged. DISCHARGE DIAGNOSIS:  Ventral hernia repair with postoperative ileus. HISTORY AND HOSPITAL COURSE:  This is a very pleasant 19-year-old white male who presents with a history of having come into the hospital for an incarcerated ventral hernia repair. The patient had an implantation of mesh. He had a very slow recovery and very slow recovery of bowel function. He had upper GI and small-bowel followthrough x-rays and found to begin to pass gas. His pain was controlled. He was using incentive spirometry. He was able to eat a simple diet and we discharged him home. PHYSICAL EXAMINATION:    VITAL SIGNS:  On discharge, temperature 97.8, pulse 78, respiratory rate 18, blood pressure 120/65, 100%. LUNGS:  Occasional rhonchi. HEART:  Normal S1, S2. No S3.  ABDOMEN:  Soft. Tender in the surgical access sites.   EXTREMITIES:  Without edema.  NEUROLOGIC:  He is alert, oriented, friendly, and cooperative. LABORATORY DATA:  Please see chart. ASSESSMENT AND PLAN:    1. Status post laparotomy with very slow recovery of bowel function with prolonged postoperative ileus. The patient much improved. No actual small-bowel obstruction was found. 2.   Obesity, body mass index of 36.96. May need LUIS DANIEL workup. 3.   Hypertension. 4.   DVT prophylaxis. Subcutaneous heparin. CONDITION ON DISCHARGE:  Stable. CODE STATUS:  Full Code. DISCHARGE MEDICATIONS:    1. Aspirin 81 mg daily. 2.   Fenofibrate 1 tablet daily. 3.   Lisinopril 40 mg daily. 4.   Tylenol 1000 mg every 8 hours as needed. 5.   Colace 100 mg p.o. b.i.d. for 7 days. 6.   Tramadol 50 mg every 6 hours as needed for pain. DIET:  Soft food, low salt, low fat. ACTIVITY:  As tolerated. No heavy exercise. No driving until cleared by Dr. Krys Kingston. FOLLOWUP:  Dr. Krys Kingston in 1 week for all wound care and activity orders. Follow up with Dr. Angelina Ring in 1 week. Return if fevers, chills, sweats, nausea, vomiting, chest pain, shortness of breath, or other complaints. RISK OF READMISSION:  Moderate. TCM followup is recommended. Dictated By Miesha Cruz.  MD Bola  d: 11/25/2023 12:13:37  t: 11/27/2023 08:31:58  Job [id number]  LAS/    Electronically signed by Aureliano Goldberg MD on 11/27/2023  9:25 AM